# Patient Record
Sex: FEMALE | Race: WHITE | NOT HISPANIC OR LATINO | Employment: OTHER | URBAN - NONMETROPOLITAN AREA
[De-identification: names, ages, dates, MRNs, and addresses within clinical notes are randomized per-mention and may not be internally consistent; named-entity substitution may affect disease eponyms.]

---

## 2017-01-18 ENCOUNTER — OFFICE VISIT (OUTPATIENT)
Dept: PSYCHIATRY | Facility: CLINIC | Age: 57
End: 2017-01-18

## 2017-01-18 DIAGNOSIS — F33.3 SEVERE EPISODE OF RECURRENT MAJOR DEPRESSIVE DISORDER, WITH PSYCHOTIC FEATURES (HCC): Primary | ICD-10-CM

## 2017-01-18 PROCEDURE — 90837 PSYTX W PT 60 MINUTES: CPT | Performed by: SOCIAL WORKER

## 2017-01-18 NOTE — MR AVS SNAPSHOT
Delphine Mcmillan   2017 8:00 AM   Appointment    Dept Phone:  [phone]   Encounter #:  [id number]    Provider:  Hussein Cruz LCSW   Department:  [facility] BEHAVIORAL HEALTH                Your Full Care Plan              Your Updated Medication List          This list is accurate as of: 17  9:09 AM.  Always use your most recent med list.                ARIPiprazole 5 MG tablet   Commonly known as:  ABILIFY   Take 1 tablet by mouth Daily.       buPROPion  MG 24 hr tablet   Commonly known as:  WELLBUTRIN XL   Take 1 tablet by mouth Every Morning.       FLUoxetine 20 MG capsule   Commonly known as:  PROzac   Take 3 daily       HYDROcodone-acetaminophen 7.5-325 MG per tablet   Commonly known as:  NORCO       methylphenidate 20 MG tablet   Commonly known as:  RITALIN   Take 1 tablet by mouth 2 (Two) Times a Day.       prazosin 5 MG capsule   Commonly known as:  MINIPRESS   Take 1 capsule by mouth Every Night.       QUEtiapine 50 MG tablet   Commonly known as:  SEROquel   1-2 tabs nightly as needed for sleep               Instructions     None    Patient Instructions History      Upcoming Appointments     Visit Type Date Time Department    PSYCHOTHERAPY 2017  8:00 AM [facility]    MEDICINE CHECK 2017  2:30 PM [facility]    [facility]      MyChart Signup     [facility] [employer] allows you to send messages to your doctor, view your test results, renew your prescriptions, schedule appointments, and more. To sign up, go to [employer] and click on the Sign Up Now link in the New User? box. Enter your Yap Activation Code exactly as it appears below along with the last four digits of your Social Security Number and your Date of Birth () to complete the sign-up process. If you do not sign up before the expiration date, you must request a new code.    Yap Activation Code:  [email or web address]  Expires: 2/1/2017  9:09 AM    If you have questions, you can email [email or web address] or call [phone] to talk to our MyChart staff. Remember, [employer] is NOT to be used for urgent needs. For medical emergencies, dial 911.               Other Info from Your Visit           Your Appointments     Jan 19, 2017  2:30 PM EST   Medicine Check with Bakari Montanez MD   [facility] BEHAVIORAL HEALTH (--)    [address]  [place] [address]   [phone]            Feb 15, 2017  8:00 AM EST   Psychotherapy with Hussein Cruz LCSW   [facility] BEHAVIORAL HEALTH (--)    [address]  [place] [address]   [phone]              Allergies     Codeine  Hives, Itching

## 2017-01-19 ENCOUNTER — OFFICE VISIT (OUTPATIENT)
Dept: PSYCHIATRY | Facility: CLINIC | Age: 57
End: 2017-01-19

## 2017-01-19 VITALS
BODY MASS INDEX: 41.62 KG/M2 | DIASTOLIC BLOOD PRESSURE: 86 MMHG | HEIGHT: 66 IN | WEIGHT: 259 LBS | HEART RATE: 86 BPM | SYSTOLIC BLOOD PRESSURE: 152 MMHG

## 2017-01-19 DIAGNOSIS — F40.01 PANIC DISORDER WITH AGORAPHOBIA: ICD-10-CM

## 2017-01-19 DIAGNOSIS — F33.3 MDD (MAJOR DEPRESSIVE DISORDER), RECURRENT, SEVERE, WITH PSYCHOSIS (HCC): Primary | ICD-10-CM

## 2017-01-19 PROCEDURE — 99214 OFFICE O/P EST MOD 30 MIN: CPT | Performed by: PSYCHIATRY & NEUROLOGY

## 2017-01-19 RX ORDER — METHYLPHENIDATE HYDROCHLORIDE 20 MG/1
TABLET ORAL
Qty: 90 TABLET | Refills: 0 | Status: SHIPPED | OUTPATIENT
Start: 2017-01-19 | End: 2017-03-22 | Stop reason: SDUPTHER

## 2017-01-19 RX ORDER — QUETIAPINE FUMARATE 50 MG/1
TABLET, FILM COATED ORAL
Qty: 60 TABLET | Refills: 1 | Status: SHIPPED | OUTPATIENT
Start: 2017-01-19 | End: 2017-03-22 | Stop reason: SDUPTHER

## 2017-01-19 RX ORDER — FLUOXETINE HYDROCHLORIDE 20 MG/1
CAPSULE ORAL
Qty: 90 CAPSULE | Refills: 1 | Status: SHIPPED | OUTPATIENT
Start: 2017-01-19 | End: 2017-03-22 | Stop reason: SDUPTHER

## 2017-01-19 RX ORDER — PRAZOSIN HYDROCHLORIDE 2 MG/1
6 CAPSULE ORAL NIGHTLY
Qty: 90 CAPSULE | Refills: 1 | Status: SHIPPED | OUTPATIENT
Start: 2017-01-19 | End: 2017-03-22 | Stop reason: SDUPTHER

## 2017-01-19 RX ORDER — ARIPIPRAZOLE 5 MG/1
5 TABLET ORAL DAILY
Qty: 30 TABLET | Refills: 1 | Status: SHIPPED | OUTPATIENT
Start: 2017-01-19 | End: 2017-03-22 | Stop reason: SDUPTHER

## 2017-01-19 RX ORDER — BUPROPION HYDROCHLORIDE 300 MG/1
300 TABLET ORAL EVERY MORNING
Qty: 30 TABLET | Refills: 1 | Status: SHIPPED | OUTPATIENT
Start: 2017-01-19 | End: 2017-03-22 | Stop reason: SDUPTHER

## 2017-01-19 RX ORDER — METHYLPHENIDATE HYDROCHLORIDE 20 MG/1
TABLET ORAL
Qty: 90 TABLET | Refills: 0 | Status: SHIPPED | OUTPATIENT
Start: 2017-01-19 | End: 2017-01-19 | Stop reason: SDUPTHER

## 2017-01-19 NOTE — PROGRESS NOTES
"Hipolito Akers : 1960    MULTIDISCIPLINARY PROGRESS NOTE     Date of Service: 2017  Time In: 8:05 AM        Time Out: 9:00 AM     DATA: Patient met 1:1 with Hussein Cruz LCSW, LCADC for scheduled individual outpatient psychotherapy session at the [facility] for follow-up.  Patient reports she continues to spend most of her time at home and states she only leaves for necessary appointments.  She states \"I feel safe when I'm at home\".  Patient does report her  recently experienced Medicare denying one of his medications which causes her to be afraid they will stop paying for her medications.  Patient states \"it scares me to death because I know it helps me\".  The patient states anytime she leaves the home she begins thinking about all the catastrophic things that could happen that haven't happened yet.  She even states she has been unable to visit her daughter, who recently was , even after her granddaughter asked her to come.  The patient states she doesn't like to talk on the phone or have any interaction with anyone if she can avoid it.    HPI: Patient reports she continues to struggle with symptoms including depressed mood, low motivation, difficulty concentrating, becoming easily agitated, anhedonia, racing thoughts, obsessive worry, low self-esteem, extreme social isolation, feelings of hopelessness, difficulty interacting with others, feeling overwhelmed, distinct sense of impending doom, paranoid ideation, and tendency to catastrophize.. She continues to rate symptoms of depression at an 7 on a scale of 1-10 with 10 being the most severe. She reports she continues to adhere to medication regimen as prescribed with no side effects.  Patient adamantly and convincingly denies current suicidal or homicidal ideation.  Patient vehemently denies any substance use.      CLNICAL MANEUVERING/INTERVENTION: Assisted patient in processing above session content; acknowledged and normalized " patient’s thoughts, feelings, and fears.  Assisted patient in identifying and verbalizing her tendency toward paranoia and her irrational thought process.  Attempted to walk the patient through identifying the nature of her irrational thoughts and demonstrated challenging faulty thoughts with Factual counter arguments.  Also challenged the patient to consider her social isolation is likely reinforcing her symptoms by reducing her ability to cope with the stressors.  Challenge the patient to actively look for activities she can engage in on a regular basis to reduce social isolation.  Also encouraged patient to make a concerted effort to identify when she is being negative and to make a rational argument for why she can be positive in the same situation.    Allowed patient to freely discuss issues without interruption or judgment and provided safe, confidential environment to facilitate the development of positive therapeutic relationship and encourage open, honest communication. Assisted patient in identifying increased risk factors which would indicate the need for higher level of care including thoughts to harm self or others and/or self-harming behaviors and encouraged patient to contact this office, call 911, or present to the nearest emergency room. Discussed crisis intervention services and means to access.        ASSESSMENT: Patient presents for session on time, clean and casually dressed with depressed mood and somewhat blunted affect. Associations intact.  No evidence of intoxication, withdrawal, or perceptual disturbance.  Thought process is linear, thought content paranoid.  Attention and concentration fair, insight and judgment fair. Patient is oriented to person, place, and time.  Patient denies current suicidal or homicidal ideation. Patient appears to continue to build rapport with the undersigned.   Speech is clear and coherent.  Patient appears cooperative and agreeable to continued treatment but  remains apprehensive. Patient does not appear to be malingering.  Patient continues to struggle with severe symptoms of depression which are exacerbated by severe social isolation and her continued rumination and fear of social situations.  She appears to have accepted the path of least resistance by reviewing her home as her safe place and takes unnecessary steps to avoid leaving if possible.  As a result, she can be reasonably expected to continue to benefit from ongoing treatment and would likely be at increased risk for decompensation otherwise.    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  Patient will adhere to medication regimen as prescribed and report any side effects. Patient will contact this office, call 911 or present to the nearest emergency room should suicidal ideations occur. Continue Cognitive Behavioral Therapy and Solution Focused Therapy to improve functioning, maintain stability, and avoid decompensation and the need for higher level of care.     Hussein Cruz, ISAIAH, [facility]
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
## 2017-01-19 NOTE — MR AVS SNAPSHOT
Delphine Mcmillan   1/19/2017 2:30 PM   Office Visit    Dept Phone:  [phone]   Encounter #:  [id number]    Provider:  Bakari Montanez MD   Department:  [facility] BEHAVIORAL HEALTH                Your Full Care Plan              Today's Medication Changes          These changes are accurate as of: 1/19/17  3:23 PM.  If you have any questions, ask your nurse or doctor.               Medication(s)that have changed:     methylphenidate 20 MG tablet   Commonly known as:  RITALIN   Take 1.5 tabs po BID   What changed:    - how much to take  - how to take this  - when to take this  - additional instructions       prazosin 2 MG capsule   Commonly known as:  MINIPRESS   Take 3 capsules by mouth Every Night.   What changed:    - medication strength  - how much to take            Where to Get Your Medications      These medications were sent to [facility] [place] [address] - [phone]  - [phone] FX  [address] [address]     Phone:  [phone]     ARIPiprazole 5 MG tablet    buPROPion  MG 24 hr tablet    FLUoxetine 20 MG capsule    prazosin 2 MG capsule    QUEtiapine 50 MG tablet         You can get these medications from any pharmacy     Bring a paper prescription for each of these medications     methylphenidate 20 MG tablet                  Your Updated Medication List          This list is accurate as of: 1/19/17  3:23 PM.  Always use your most recent med list.                ARIPiprazole 5 MG tablet   Commonly known as:  ABILIFY   Take 1 tablet by mouth Daily.       buPROPion  MG 24 hr tablet   Commonly known as:  WELLBUTRIN XL   Take 1 tablet by mouth Every Morning.       FLUoxetine 20 MG capsule   Commonly known as:  PROzac   Take 3 daily       HYDROcodone-acetaminophen 7.5-325 MG per tablet   Commonly known as:  NORCO       methylphenidate 20 MG tablet   Commonly known as:  RITALIN   Take 1.5  "tabs po BID       prazosin 2 MG capsule   Commonly known as:  MINIPRESS   Take 3 capsules by mouth Every Night.       QUEtiapine 50 MG tablet   Commonly known as:  SEROquel   1-2 tabs nightly as needed for sleep               Instructions     None    Patient Instructions History      Upcoming Appointments     Visit Type Date Time Department    MEDICINE CHECK 2017  2:30 PM [facility]    PSYCHOTHERAPY 2/15/2017  8:00 AM [facility]    MEDICINE CHECK 3/22/2017  3:00 PM [facility]      MyChart Signup     [facility] [employer] allows you to send messages to your doctor, view your test results, renew your prescriptions, schedule appointments, and more. To sign up, go to [employer] and click on the Sign Up Now link in the New User? box. Enter your [employer] Activation Code exactly as it appears below along with the last four digits of your Social Security Number and your Date of Birth () to complete the sign-up process. If you do not sign up before the expiration date, you must request a new code.    [employer] Activation Code: [email or web address]  Expires: 2017  9:09 AM    If you have questions, you can email [email or web address] or call [phone] to talk to our [employer] staff. Remember, [employer] is NOT to be used for urgent needs. For medical emergencies, dial 911.               Other Info from Your Visit           Your Appointments     Feb 15, 2017  8:00 AM EST   Psychotherapy with Hussein Cruz LCSW   [facility] BEHAVIORAL HEALTH (--)    [address]  [place] [address]   [phone]            Mar 22, 2017  3:00 PM EDT   Medicine Check with Bakari Montanez MD   [facility] BEHAVIORAL [facility] (--)    [address]  [place] [address]   [phone]              Allergies     Codeine  Hives, Itching      Vital Signs     Blood Pressure Pulse Height Weight Body Mass Index       152/86 86 66\" (167.6 cm) 259 lb (117 kg) 41.8 kg/m2         "

## 2017-03-21 ENCOUNTER — OFFICE VISIT (OUTPATIENT)
Dept: PSYCHIATRY | Facility: CLINIC | Age: 57
End: 2017-03-21

## 2017-03-21 DIAGNOSIS — F40.01 PANIC DISORDER WITH AGORAPHOBIA: ICD-10-CM

## 2017-03-21 DIAGNOSIS — F33.3 SEVERE EPISODE OF RECURRENT MAJOR DEPRESSIVE DISORDER, WITH PSYCHOTIC FEATURES (HCC): Primary | ICD-10-CM

## 2017-03-21 PROCEDURE — 90834 PSYTX W PT 45 MINUTES: CPT | Performed by: SOCIAL WORKER

## 2017-03-21 NOTE — TREATMENT PLAN
Multi-Disciplinary Problems (from Behavioral Health Treatment Plan)    Active Problems     Problem:  Patient Care Overview (Adult) (Priority: --)  (Start Date: 03/21/17) (Resolve Date: --)    Problem Details:  The patient continues to struggle with severe depression which also leads to irrational thought process and severe anxiety.  Patient continues to spend the vast majority of her time in her bedroom and continues to struggle with depressed mood, low self-esteem, negative self-image, difficulty concentrating, difficulty interacting with others, difficulty driving, paranoid ideation, tendency to catastrophize, increased heart rate, feeling on edge, difficulty breathing, and an intense sense of impending doom.  The patient's symptoms continued to cause significant impairment in important areas of functioning including the ability to engage in social situations including even with close family members, keeping appointments, and engaging in normal household activities including shopping etc.       Goal Start Date End Date    Plan of Care Review 03/21/17 --    Goal Details:  The patient will continue in individual outpatient psychotherapy session at the [facility] every 3-4 weeks and pharmacotherapy as scheduled with Dr. Montanez.  Patient will continue to adhere to medication regimen as prescribed and report any side effects.  Will continue to utilize cognitive behavioral therapy and motivational interviewing techniques to challenge the patient's faulty, irrational thoughts and deconstruct her paranoid ideation.  We will continue to provide safe, confidential environment to facilitate the development and maintenance of positive therapeutic relationship and encourage open, honest communication.  All this to improve the patient's functioning, maintain stability, and avoid higher level of care.                          I have discussed and reviewed this treatment plan with the patient.  It has been printed for  signatures.
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
## 2017-03-22 ENCOUNTER — OFFICE VISIT (OUTPATIENT)
Dept: PSYCHIATRY | Facility: CLINIC | Age: 57
End: 2017-03-22

## 2017-03-22 VITALS
DIASTOLIC BLOOD PRESSURE: 84 MMHG | WEIGHT: 261 LBS | HEART RATE: 98 BPM | HEIGHT: 66 IN | SYSTOLIC BLOOD PRESSURE: 140 MMHG | BODY MASS INDEX: 41.95 KG/M2

## 2017-03-22 DIAGNOSIS — F33.3 SEVERE EPISODE OF RECURRENT MAJOR DEPRESSIVE DISORDER, WITH PSYCHOTIC FEATURES (HCC): Primary | ICD-10-CM

## 2017-03-22 DIAGNOSIS — F40.01 PANIC DISORDER WITH AGORAPHOBIA: ICD-10-CM

## 2017-03-22 PROCEDURE — 99213 OFFICE O/P EST LOW 20 MIN: CPT | Performed by: PSYCHIATRY & NEUROLOGY

## 2017-03-22 RX ORDER — QUETIAPINE FUMARATE 50 MG/1
TABLET, FILM COATED ORAL
Qty: 60 TABLET | Refills: 2 | Status: SHIPPED | OUTPATIENT
Start: 2017-03-22 | End: 2017-06-21 | Stop reason: SDUPTHER

## 2017-03-22 RX ORDER — METHYLPHENIDATE HYDROCHLORIDE 20 MG/1
TABLET ORAL
Qty: 90 TABLET | Refills: 0 | Status: SHIPPED | OUTPATIENT
Start: 2017-03-22 | End: 2017-05-02 | Stop reason: SDUPTHER

## 2017-03-22 RX ORDER — PRAZOSIN HYDROCHLORIDE 2 MG/1
6 CAPSULE ORAL NIGHTLY
Qty: 90 CAPSULE | Refills: 2 | Status: SHIPPED | OUTPATIENT
Start: 2017-03-22 | End: 2017-06-21 | Stop reason: SDUPTHER

## 2017-03-22 RX ORDER — ATORVASTATIN CALCIUM 10 MG/1
TABLET, FILM COATED ORAL NIGHTLY
COMMUNITY
Start: 2017-03-01 | End: 2017-06-21 | Stop reason: DRUGHIGH

## 2017-03-22 RX ORDER — BUPROPION HYDROCHLORIDE 300 MG/1
300 TABLET ORAL EVERY MORNING
Qty: 30 TABLET | Refills: 2 | Status: SHIPPED | OUTPATIENT
Start: 2017-03-22 | End: 2017-06-21 | Stop reason: SDUPTHER

## 2017-03-22 RX ORDER — FLUOXETINE HYDROCHLORIDE 20 MG/1
CAPSULE ORAL
Qty: 90 CAPSULE | Refills: 2 | Status: SHIPPED | OUTPATIENT
Start: 2017-03-22 | End: 2017-06-21 | Stop reason: SDUPTHER

## 2017-03-22 RX ORDER — ARIPIPRAZOLE 5 MG/1
5 TABLET ORAL DAILY
Qty: 30 TABLET | Refills: 2 | Status: SHIPPED | OUTPATIENT
Start: 2017-03-22 | End: 2017-06-21 | Stop reason: SDUPTHER

## 2017-03-22 RX ORDER — LEVOTHYROXINE SODIUM 0.1 MG/1
TABLET ORAL DAILY
COMMUNITY
Start: 2017-03-01 | End: 2022-09-29

## 2017-03-22 RX ORDER — OMEPRAZOLE 20 MG/1
CAPSULE, DELAYED RELEASE ORAL
COMMUNITY
Start: 2017-02-03

## 2017-03-22 RX ORDER — METHYLPHENIDATE HYDROCHLORIDE 20 MG/1
TABLET ORAL
Qty: 90 TABLET | Refills: 0 | Status: SHIPPED | OUTPATIENT
Start: 2017-03-22 | End: 2017-03-22 | Stop reason: SDUPTHER

## 2017-03-22 NOTE — PROGRESS NOTES
"  CC: \"stressed--I need to be home---it's way too late to be out.\"    HX:  The patient came into the office today saying she was stressed and worried about being out to late.  After she started talking, she appeared much less anxious than usual.  She continues to isolate in her room at home and worries about when she does have to leave such as coming to therapy or coming to my appointments.  Over the weekend, her son wanted her to visit a house that they were looking at purchasing; however, the morning of, she was too anxious to go.  She continues to feel depressed.  No auditory or visual hallucinations.  No medication side effects.  Currently, the patient feels like her medications are at a good level and does not want to make changes today.    Depression rating 8/10  Anxiety rating 10/10  Appetite-no change  Energy level-fair  Sleep- slightly improved, dreams less vivid and scary.     I have reviewed pt's allergies and current medications.     Review of Systems   Constitutional: Negative.    Cardiovascular: Negative.    Gastrointestinal: Negative.    Neurological: Negative.      /84  Pulse 98  Ht 66\" (167.6 cm)  Wt 261 lb (118 kg)  BMI 42.13 kg/m2    EXAM: Neatly, casually dressed, good hygeine. Gait and station stable. No psychomotor agitation/retardation. No motor tics Speech is normal rate, amount. Associations intact. Mood \"anxious\" affect constricted, appears less anxious today than past TC-goal directed.  Denies SI/HI/VH/AH. TP-linear.  Attention and concentration are fair. Memory is intact for recent and remote events. Insight-limited, judgement- limited      Encounter Diagnoses   Name Primary?   • Severe episode of recurrent major depressive disorder, with psychotic features Yes   • Panic disorder with agoraphobia        Current Outpatient Prescriptions   Medication Sig Dispense Refill   • ARIPiprazole (ABILIFY) 5 MG tablet Take 1 tablet by mouth Daily. 30 tablet 2   • atorvastatin (LIPITOR) 10 " MG tablet Every Night.     • buPROPion XL (WELLBUTRIN XL) 300 MG 24 hr tablet Take 1 tablet by mouth Every Morning. 30 tablet 2   • FLUoxetine (PROzac) 20 MG capsule Take 3 daily 90 capsule 2   • HYDROcodone-acetaminophen (NORCO) 7.5-325 MG per tablet      • levothyroxine (SYNTHROID, LEVOTHROID) 100 MCG tablet Daily.     • methylphenidate (RITALIN) 20 MG tablet Take 1.5 tabs po BID 90 tablet 0   • omeprazole (priLOSEC) 20 MG capsule      • prazosin (MINIPRESS) 2 MG capsule Take 3 capsules by mouth Every Night. 90 capsule 2   • QUEtiapine (SEROquel) 50 MG tablet 1-2 tabs nightly as needed for sleep 60 tablet 2     No current facility-administered medications for this visit.    Plan:  1.  Current medications  2.  Continue therapy with Hussein Cruz  3.  Return to clinic 2-3 months          The risks, benefits, and treatment alternatives were discussed with the patient.     TIME IN:3:25P  TIME OUT:3:48P

## 2017-04-19 ENCOUNTER — OFFICE VISIT (OUTPATIENT)
Dept: PSYCHIATRY | Facility: CLINIC | Age: 57
End: 2017-04-19

## 2017-04-19 DIAGNOSIS — F40.01 PANIC DISORDER WITH AGORAPHOBIA: ICD-10-CM

## 2017-04-19 DIAGNOSIS — F33.3 SEVERE EPISODE OF RECURRENT MAJOR DEPRESSIVE DISORDER, WITH PSYCHOTIC FEATURES (HCC): Primary | ICD-10-CM

## 2017-04-19 PROCEDURE — 90834 PSYTX W PT 45 MINUTES: CPT | Performed by: SOCIAL WORKER

## 2017-04-19 NOTE — PROGRESS NOTES
"Hipolito Akers : 1960    MULTIDISCIPLINARY PROGRESS NOTE     Date of Service: 2017  Time In: 8:50 AM       Time Out: 9:30 AM    DATA: Patient met 1:1 with Hussein Cruz LCSW, LCADC for scheduled individual outpatient psychotherapy session at the [facility] for follow-up.  Patient states \"I just need to be home where I feel safe\".  She reports she has been stressed as she and her  have planned a trip to [place] to assist her son in moving.  Patient also states her daughter has a baby shower in the coming days and states her  is pushing her to go to [facility] following this session to get her gift.  As a result, she states she is very stressed and does not want to go.      HPI: Patient continues to struggle with depressed mood, low motivation, anhedonia, difficulty concentrating, becoming easily agitated, racing thoughts, obsessive worry, low self-esteem, negative self-image, extreme social isolation, feelings of hopelessness, difficulty interacting with others, feeling overwhelmed, distinct sense of impending doom, paranoid ideation, and tendency to catastrophize.. She rates current symptoms at  a 9 on  a scale of 1-10 with 10 being the most severe.  patient has a tendency to obsessively worry about things that might happen in the future which adds to her panic and paranoia.  She reports she continues to adhere to medication regimen as prescribed with no side effects.  Patient adamantly and convincingly denies current suicidal or homicidal ideation.  Patient vehemently denies any substance use.      CLNICAL MANEUVERING/INTERVENTION: Assisted patient in processing above session content; acknowledged and normalized patient’s thoughts, feelings, and fears. utilized motivational interviewing to assist the patient in identifying and acknowledging incidences where she was able to overcome her fears and follow-through with tasks including attending both of her children's weddings.  The " patient demonstrates some insight as she states she realizes these experiences are usually not as bad as she has built up in her mind.  However, she continues to struggle to overcome her sense of fear and her acceptance of the status quo.  As a result, assisted the patient in developing factual counter arguments to challenge her irrational fears.  Also encouraged the patient to consider getting a tablet so she can utilize various games as a thought blocking technique.  The patient is agreeable and states she is now looking forward to going to [facility] following this session.  Provided unconditional positive regard in a safe, supportive environment.        Allowed patient to freely discuss issues without interruption or judgment and provided safe, confidential environment to facilitate the development of positive therapeutic relationship and encourage open, honest communication. Assisted patient in identifying increased risk factors which would indicate the need for higher level of care including thoughts to harm self or others and/or self-harming behaviors and encouraged patient to contact this office, call 911, or present to the nearest emergency room. Discussed crisis intervention services and means to access.        ASSESSMENT: Patient presents for session on time, clean and casually dressed with depressed/anxious  mood and Congruent  affect. Associations intact.  No evidence of intoxication, withdrawal, or perceptual disturbance.  Thought process is linear, thought content paranoid.  Attention and concentration fair, insight and judgment fair. Patient is oriented to person, place, and time.  Patient denies current suicidal or homicidal ideation. Patient appears to continue to build rapport with the undersigned and maintain positive therapeutic relationship..   Speech is clear and coherent.  Patient appears cooperative and agreeable to continued treatment but remains apprehensive. Patient does not appear to be  malingering.  The patient continues to struggle with severe symptoms of depression, panic, and  paranoid ideation.    The patient's symptomology causes significant impairment in important areas of functioning, especially any situation outside her home.  As a result, she can be reasonably expected to continue to benefit from ongoing treatment and would likely be at increased risk for decompensation otherwise.    PLAN: Patient will continue in monthly individual outpatient psychotherapy sessions at the [facility] and pharmacotherapy as scheduled.  Patient will adhere to medication regimen as prescribed and report any side effects. Patient will contact this office, call 911 or present to the nearest emergency room should suicidal ideations occur. Continue Cognitive Behavioral Therapy and Solution Focused Therapy to improve functioning, maintain stability, and avoid decompensation and the need for higher level of care.     Hussein Cruz LCSW, [facility]

## 2017-05-03 RX ORDER — METHYLPHENIDATE HYDROCHLORIDE 20 MG/1
TABLET ORAL
Qty: 90 TABLET | Refills: 0 | Status: SHIPPED | OUTPATIENT
Start: 2017-05-03 | End: 2017-06-06 | Stop reason: SDUPTHER

## 2017-05-17 ENCOUNTER — OFFICE VISIT (OUTPATIENT)
Dept: PSYCHIATRY | Facility: CLINIC | Age: 57
End: 2017-05-17

## 2017-05-17 DIAGNOSIS — F40.01 PANIC DISORDER WITH AGORAPHOBIA: ICD-10-CM

## 2017-05-17 DIAGNOSIS — F33.3 SEVERE EPISODE OF RECURRENT MAJOR DEPRESSIVE DISORDER, WITH PSYCHOTIC FEATURES (HCC): Primary | ICD-10-CM

## 2017-05-17 PROCEDURE — 90837 PSYTX W PT 60 MINUTES: CPT | Performed by: SOCIAL WORKER

## 2017-06-07 RX ORDER — METHYLPHENIDATE HYDROCHLORIDE 20 MG/1
TABLET ORAL
Qty: 90 TABLET | Refills: 0 | Status: SHIPPED | OUTPATIENT
Start: 2017-06-07 | End: 2017-06-21 | Stop reason: SDUPTHER

## 2017-06-14 ENCOUNTER — OFFICE VISIT (OUTPATIENT)
Dept: PSYCHIATRY | Facility: CLINIC | Age: 57
End: 2017-06-14

## 2017-06-14 DIAGNOSIS — F40.01 PANIC DISORDER WITH AGORAPHOBIA: ICD-10-CM

## 2017-06-14 DIAGNOSIS — F33.3 SEVERE EPISODE OF RECURRENT MAJOR DEPRESSIVE DISORDER, WITH PSYCHOTIC FEATURES (HCC): Primary | ICD-10-CM

## 2017-06-14 PROCEDURE — 90832 PSYTX W PT 30 MINUTES: CPT | Performed by: SOCIAL WORKER

## 2017-06-14 NOTE — PROGRESS NOTES
"Hipolito Akers : 1960    MULTIDISCIPLINARY PROGRESS NOTE     Date of Service: 2017  Time In: 8:45 AM        Time Out: 9:20 AM    DATA: Patient met 1:1 with Hussein Cruz LCSW, LCADC for scheduled individual outpatient psychotherapy session at the [facility] for follow-up.  Patient reports her second granddaughter was born all Memorial Day and states she is already in love with her.  However, she states she continues to dread babysitting when her daughter returns to work in approximately 2 months.  Patient reports she continues to spend the vast majority of her time at home and primarily stays in her room.  Patient reports she has not been able to get a tablet as of yet due to her fear of spending money as a result of the financial difficulties she and her  experienced while she was seeking disability benefits.  She states her  continues to prompt her to get one but she is simply unable to spend the money.  Patient also discusses her fear of an upcoming family reunion and states \"I just don't think I can do it\".    HPI: Patient continues to struggle with depressed mood, low motivation, difficulty concentrating, becoming easily agitated, anhedonia, anergia, racing thoughts, obsessive worry, low self-esteem, negative self-image, extreme social isolation, feelings of hopelessness, difficulty interacting with others, feeling overwhelmed, distinct sense of impending doom, paranoid ideation, and tendency to catastrophize.  The patient continues to discuss the fact she feels others are watching her and could harm her.  She specifically states she believes the police watch her when she leaves her home.  The patient continues to make faulty cause/affect Association.  She rates current symptoms at an 8 on a scale of 1-10 with 10 being the most severe. She reports she continues to adhere to medication regimen as prescribed with no side effects.  Patient adamantly and convincingly denies current " suicidal or homicidal ideation.  Patient vehemently denies any substance use.      CLNICAL MANEUVERING/INTERVENTION: Assisted patient in processing above session content; acknowledged and normalized patient’s thoughts, feelings, and fears.  Allowed the patient to discuss her feelings and fears concerning the recent birth of her granddaughter and validated her feelings.  Also congratulated the patient on her new grandchild.  Prompted the patient to identify and acknowledge her symptoms multiple negative impact on her life including being as involved with family members as she would like, going out into public to do such activities as shopping, and having a negative impact on multiple interpersonal relationships.  Utilized in her motivational interviewing to assist the patient in identifying and acknowledging the fact she has been able to overcome her fears in the past and utilize the example of when her daughter called her at night and ask her to meet her at the hospital.  Discussed the concept of the path of least resistance and encourage the patient to continue to challenge her comfort zone.  Also challenged the patient's metacognition that something negative will always happen and challenged her to begin to attempt to focus on the positive.  Provided unconditional positive regard in a safe, supportive environment.       Allowed patient to freely discuss issues without interruption or judgment and provided safe, confidential environment to facilitate the development of positive therapeutic relationship and encourage open, honest communication. Assisted patient in identifying increased risk factors which would indicate the need for higher level of care including thoughts to harm self or others and/or self-harming behaviors and encouraged patient to contact this office, call 911, or present to the nearest emergency room. Discussed crisis intervention services and means to access.        ASSESSMENT: Patient presents  for session on time, clean and casually dressed with depressed mood and somewhat blunted affect. Associations intact.  No evidence of intoxication, withdrawal, or perceptual disturbance.  Thought process is linear, thought content paranoid.  Attention and concentration fair, insight and judgment fair. Patient is oriented to person, place, and time.  Patient denies current suicidal or homicidal ideation. Patient appears to continue to build rapport with the undersigned and maintain positive therapeutic relationship..   Speech is clear and coherent.  Patient appears cooperative and agreeable to continued treatment but remains apprehensive. Patient does not appear to be malingering.  The patient continues to struggle with severe symptoms of depression with paranoid ideation which also leads to significant symptoms of panic.  The patient's symptomology causes significant impairment in important areas of functioning, especially any situation outside her home.  As a result, she can be reasonably expected to continue to benefit from ongoing treatment and would likely be at increased risk for decompensation otherwise.    PLAN: Patient will continue in monthly individual outpatient psychotherapy sessions at the [facility] and pharmacotherapy as scheduled.  Patient will adhere to medication regimen as prescribed and report any side effects. Patient will contact this office, call 911 or present to the nearest emergency room should suicidal ideations occur. Continue Cognitive Behavioral Therapy and Solution Focused Therapy to improve functioning, maintain stability, and avoid decompensation and the need for higher level of care.     Hussein Cruz, ISAIAH, [facility]

## 2017-06-21 ENCOUNTER — OFFICE VISIT (OUTPATIENT)
Dept: PSYCHIATRY | Facility: CLINIC | Age: 57
End: 2017-06-21

## 2017-06-21 VITALS
HEIGHT: 66 IN | HEART RATE: 83 BPM | SYSTOLIC BLOOD PRESSURE: 143 MMHG | WEIGHT: 259 LBS | BODY MASS INDEX: 41.62 KG/M2 | DIASTOLIC BLOOD PRESSURE: 88 MMHG

## 2017-06-21 DIAGNOSIS — F32.3 SEVERE SINGLE CURRENT EPISODE OF MAJOR DEPRESSIVE DISORDER, WITH PSYCHOTIC FEATURES (HCC): Primary | ICD-10-CM

## 2017-06-21 DIAGNOSIS — F40.01 PANIC DISORDER WITH AGORAPHOBIA: ICD-10-CM

## 2017-06-21 PROCEDURE — 99213 OFFICE O/P EST LOW 20 MIN: CPT | Performed by: PSYCHIATRY & NEUROLOGY

## 2017-06-21 RX ORDER — QUETIAPINE FUMARATE 50 MG/1
TABLET, FILM COATED ORAL
Qty: 60 TABLET | Refills: 2 | Status: SHIPPED | OUTPATIENT
Start: 2017-06-21 | End: 2017-08-31 | Stop reason: SDUPTHER

## 2017-06-21 RX ORDER — METHYLPHENIDATE HYDROCHLORIDE 20 MG/1
TABLET ORAL
Qty: 90 TABLET | Refills: 0 | Status: SHIPPED | OUTPATIENT
Start: 2017-06-21 | End: 2017-08-31 | Stop reason: SDUPTHER

## 2017-06-21 RX ORDER — ATORVASTATIN CALCIUM 20 MG/1
TABLET, FILM COATED ORAL
COMMUNITY
Start: 2017-05-01 | End: 2022-03-28

## 2017-06-21 RX ORDER — METHYLPHENIDATE HYDROCHLORIDE 20 MG/1
TABLET ORAL
Qty: 90 TABLET | Refills: 0 | Status: SHIPPED | OUTPATIENT
Start: 2017-06-21 | End: 2017-06-21 | Stop reason: SDUPTHER

## 2017-06-21 RX ORDER — BUPROPION HYDROCHLORIDE 300 MG/1
300 TABLET ORAL EVERY MORNING
Qty: 30 TABLET | Refills: 2 | Status: SHIPPED | OUTPATIENT
Start: 2017-06-21 | End: 2017-08-31 | Stop reason: SDUPTHER

## 2017-06-21 RX ORDER — ARIPIPRAZOLE 5 MG/1
5 TABLET ORAL DAILY
Qty: 30 TABLET | Refills: 2 | Status: SHIPPED | OUTPATIENT
Start: 2017-06-21 | End: 2017-08-31 | Stop reason: SDUPTHER

## 2017-06-21 RX ORDER — PRAZOSIN HYDROCHLORIDE 2 MG/1
6 CAPSULE ORAL NIGHTLY
Qty: 90 CAPSULE | Refills: 2 | Status: SHIPPED | OUTPATIENT
Start: 2017-06-21 | End: 2017-08-31 | Stop reason: SDUPTHER

## 2017-06-21 RX ORDER — FLUOXETINE HYDROCHLORIDE 20 MG/1
CAPSULE ORAL
Qty: 90 CAPSULE | Refills: 2 | Status: SHIPPED | OUTPATIENT
Start: 2017-06-21 | End: 2017-08-31 | Stop reason: SDUPTHER

## 2017-06-21 NOTE — PROGRESS NOTES
"  CC: f/u MDD, PTSD    HX:  The patient continues to experience intense anxiety and prefers to isolate in her room.  She is caring for her new grandbaby.  She described feeling conflicted of both loving the baby but doesn't want the responsibility.  She denies any thoughts of harming the child.  She also denies SI/HI.  Denies voices.  She continues to have trouble sleeping and feeling tired. No med s/e but still frustrated with weight gain.  She has lost 2 lbs since last appt.     Appetite-no changes.   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     Review of Systems   Constitutional: Negative.    Cardiovascular: Negative.    Gastrointestinal: Negative.    Neurological: Negative.      /88  Pulse 83  Ht 66\" (167.6 cm)  Wt 259 lb (117 kg)  BMI 41.8 kg/m2      EXAM: Neatly, casually dressed, good hygeine. Gait and station stable. No psychomotor agitation/retardation. No motor tics Speech is normal rate, amount. Associations intact. Mood \"okay\" affect still constricted but appears more euthymic today TC-goal directed. Denies SI/HI/VH/AH. TP-linear. Attention and concentration are fair. Memory is intact for recent and remote events. Insight-limited, judgement- limited    Encounter Diagnoses   Name Primary?   • Severe single current episode of major depressive disorder, with psychotic features Yes   • Panic disorder with agoraphobia        Current Outpatient Prescriptions   Medication Sig Dispense Refill   • ARIPiprazole (ABILIFY) 5 MG tablet Take 1 tablet by mouth Daily. 30 tablet 2   • atorvastatin (LIPITOR) 20 MG tablet      • buPROPion XL (WELLBUTRIN XL) 300 MG 24 hr tablet Take 1 tablet by mouth Every Morning. 30 tablet 2   • FLUoxetine (PROzac) 20 MG capsule Take 3 daily 90 capsule 2   • HYDROcodone-acetaminophen (NORCO) 7.5-325 MG per tablet      • levothyroxine (SYNTHROID, LEVOTHROID) 100 MCG tablet Daily.     • " omeprazole (priLOSEC) 20 MG capsule      • prazosin (MINIPRESS) 2 MG capsule Take 3 capsules by mouth Every Night. 90 capsule 2   • QUEtiapine (SEROquel) 50 MG tablet 1-2 tabs nightly as needed for sleep 60 tablet 2   • methylphenidate (RITALIN) 20 MG tablet Take 1.5 tabs po BID 90 tablet 0     No current facility-administered medications for this visit.    Plan:  1. continue current medications.    2. Obtain recent labs from PCP including lipid panel  3. rtc 3 months  4. Continue therapy with Hussein Cruz.     The risks, benefits, and treatment alternatives were discussed with the patient.     TIME IN:300PM  TIME OUT:324PM

## 2017-08-31 ENCOUNTER — OFFICE VISIT (OUTPATIENT)
Dept: PSYCHIATRY | Facility: CLINIC | Age: 57
End: 2017-08-31

## 2017-08-31 VITALS
SYSTOLIC BLOOD PRESSURE: 139 MMHG | HEART RATE: 94 BPM | DIASTOLIC BLOOD PRESSURE: 67 MMHG | HEIGHT: 66 IN | WEIGHT: 257 LBS | BODY MASS INDEX: 41.3 KG/M2

## 2017-08-31 DIAGNOSIS — F40.01 PANIC DISORDER WITH AGORAPHOBIA: ICD-10-CM

## 2017-08-31 DIAGNOSIS — F33.3 SEVERE EPISODE OF RECURRENT MAJOR DEPRESSIVE DISORDER, WITH PSYCHOTIC FEATURES (HCC): ICD-10-CM

## 2017-08-31 PROCEDURE — 99213 OFFICE O/P EST LOW 20 MIN: CPT | Performed by: PSYCHIATRY & NEUROLOGY

## 2017-08-31 RX ORDER — FLUOXETINE HYDROCHLORIDE 20 MG/1
CAPSULE ORAL
Qty: 90 CAPSULE | Refills: 2 | Status: SHIPPED | OUTPATIENT
Start: 2017-08-31 | End: 2017-11-29 | Stop reason: SDUPTHER

## 2017-08-31 RX ORDER — ARIPIPRAZOLE 5 MG/1
5 TABLET ORAL DAILY
Qty: 30 TABLET | Refills: 2 | Status: SHIPPED | OUTPATIENT
Start: 2017-08-31 | End: 2017-11-29 | Stop reason: SDUPTHER

## 2017-08-31 RX ORDER — METHYLPHENIDATE HYDROCHLORIDE 20 MG/1
TABLET ORAL
Qty: 90 TABLET | Refills: 0 | Status: SHIPPED | OUTPATIENT
Start: 2017-08-31 | End: 2017-11-20 | Stop reason: SDUPTHER

## 2017-08-31 RX ORDER — BUPROPION HYDROCHLORIDE 300 MG/1
300 TABLET ORAL EVERY MORNING
Qty: 30 TABLET | Refills: 2 | Status: SHIPPED | OUTPATIENT
Start: 2017-08-31 | End: 2017-11-29 | Stop reason: SDUPTHER

## 2017-08-31 RX ORDER — METHYLPHENIDATE HYDROCHLORIDE 20 MG/1
TABLET ORAL
Qty: 90 TABLET | Refills: 0 | Status: SHIPPED | OUTPATIENT
Start: 2017-08-31 | End: 2017-08-31 | Stop reason: SDUPTHER

## 2017-08-31 RX ORDER — QUETIAPINE FUMARATE 50 MG/1
TABLET, FILM COATED ORAL
Qty: 60 TABLET | Refills: 2 | Status: SHIPPED | OUTPATIENT
Start: 2017-08-31 | End: 2017-11-29 | Stop reason: SDUPTHER

## 2017-08-31 RX ORDER — PRAZOSIN HYDROCHLORIDE 2 MG/1
6 CAPSULE ORAL NIGHTLY
Qty: 90 CAPSULE | Refills: 2 | Status: SHIPPED | OUTPATIENT
Start: 2017-08-31 | End: 2017-11-29 | Stop reason: SDUPTHER

## 2017-09-06 ENCOUNTER — OFFICE VISIT (OUTPATIENT)
Dept: PSYCHIATRY | Facility: CLINIC | Age: 57
End: 2017-09-06

## 2017-09-06 DIAGNOSIS — F33.3 MAJOR DEPRESSIVE DISORDER, RECURRENT EPISODE, SEVERE, WITH PSYCHOSIS (HCC): Primary | ICD-10-CM

## 2017-09-06 DIAGNOSIS — F40.01 PANIC DISORDER WITH AGORAPHOBIA: ICD-10-CM

## 2017-09-06 PROCEDURE — 90832 PSYTX W PT 30 MINUTES: CPT | Performed by: SOCIAL WORKER

## 2017-09-06 NOTE — PROGRESS NOTES
"Date of Service: September 6, 2017  Time In: 11: 00 AM  Time Out: 11:30 AM      PROGRESS NOTE  Data:  Delphine Mcmillan is a 57 y.o. female who met 1:1 with Hussein Cruz LCSW,FRANK for regularly scheduled individual outpatient psychotherapy session at the [facility] for follow-up of depression and panic disorder.  Patient reports she initially was going to cancel this appointment until from staff member encouraged her to attempt to make it.  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  She states \"I just don't know if I can handle it\".     HPI: Patient continues to struggle with severe depression as evidenced by depressed mood, hopelessness and helplessness, feeling useless, low self-esteem, negative self-image, anhedonia, anergia, excessive worry, and severe social isolation.  She rates current symptoms of depression at an 8/9 on a scale of 1-10 with 10 being most severe.  She also continues to struggle with panic disorder as evidenced by a feeling of extreme fear, a general feeling of vulnerability, increased heart rate, tendency to ruminate, shaking, and an intense sense of impending doom.  Patient rates current symptoms of anxiety at an 8/9 on a scale of 1-10 with 10 being most severe.  Patient reports she continues to adhere to medication regimen as prescribed.  The patient adamantly and convincingly denies suicidal ideation and vehemently denies any substance use.      Clinical Maneuvering/Intervention:  Assisted patient in processing above session content; acknowledged and normalized patient’s thoughts, feelings, and concerns.  Utilize cognitive behavioral therapy to assist the patient in developing appropriate coping mechanisms to decrease severity and frequency of symptoms and increase her ability to function including positive self talk, thought blocking techniques, relaxation " techniques, and actively seeking activities she can engage in a regular basis to decrease idle time and social isolation.  Also discussed the mutually self-perpetuating nature of social isolation and the patient symptoms and encouraged her to look for ways to get out of her comfort zone which will ultimately build confidence and result and desensitization of triggers.  Also encouraged the patient to consider she might not be capable of babysitting 5 days weekly and to consider reducing the number of days weekly.  Continue to encourage the patient to consider getting herself a tablet for past time and thought blocking purposes.  Provided unconditional positive regard a safe, supportive environment.    Allowed patient to freely discuss issues without interruption or judgment. Provided safe, confidential environment to facilitate the development of positive therapeutic relationship and encourage open, honest communication. Assisted patient in identifying risk factors which would indicate the need for higher level of care including thoughts to harm self or others and/or self-harming behavior and encouraged patient to contact this office, call 911, or present to the nearest emergency room should any of these events occur. Discussed crisis intervention services and means to access.  Patient adamantly and convincingly denies current suicidal or homicidal ideation or perceptual disturbance.    Assessment    The patient continues to struggle with significant symptoms of depression and panic which appear to have been exacerbated as of late due to the patient babysitting her granddaughter 5 days weekly between 10 and 11 hours daily.  Patient's difficulty in leaving the home appears to also have been reinforced by the fact she has not left her room for any significant amount of time for the past several weeks.  As a result, the patient to be really expected to continue to benefit from treatment and would likely be at increased  risk for decompensation otherwise.    Diagnoses and all orders for this visit:    Major depressive disorder, recurrent episode, severe, with psychosis    Panic disorder with agoraphobia               Mental Status Exam  Hygiene:  good  Dress:  casual  Attitude:  Cooperative  Motor Activity:  Slow  Speech:  Monotone  Mood:  anxious and depressed  Affect:  very anxious  Thought Processes:  Linear  Thought Content:  normal  Suicidal Thoughts:  denies  Homicidal Thoughts:  denies  Crisis Safety Plan: yes, to come to the emergency room.  Hallucinations:  denies    Patient's Support Network Includes:  , daughter and son    Progress toward goal: Not at goal    Functional Status: Severe impairment    Prognosis: Fair with Ongoing Treatment     Plan         The patient will continue in individual outpatient psychotherapy sessions at the [facility] every 2-3 weeks.  Patient will continue with pharmacotherapy with Dr. Montanez and adhere to medication regimen as prescribed and report any side effects. Patient will contact this office, call 911 or present to the nearest emergency room should suicidal or homicidal ideations occur. Provide Cognitive Behavioral Therapy and Solution Focused Therapy to improve functioning, maintain stability, and avoid decompensation and the need for higher level of care.          Return in about 2 weeks (around 09/20/2017) for Next scheduled follow up.    Hussein Cruz, ISAIAH,[facility]

## 2017-11-20 RX ORDER — METHYLPHENIDATE HYDROCHLORIDE 20 MG/1
TABLET ORAL
Qty: 90 TABLET | Refills: 0 | Status: SHIPPED | OUTPATIENT
Start: 2017-11-20 | End: 2017-11-29 | Stop reason: SDUPTHER

## 2017-11-30 RX ORDER — FLUOXETINE HYDROCHLORIDE 20 MG/1
CAPSULE ORAL
Qty: 90 CAPSULE | Refills: 2 | Status: SHIPPED | OUTPATIENT
Start: 2017-11-30 | End: 2018-02-15 | Stop reason: SDUPTHER

## 2017-11-30 RX ORDER — BUPROPION HYDROCHLORIDE 300 MG/1
300 TABLET ORAL EVERY MORNING
Qty: 30 TABLET | Refills: 2 | Status: SHIPPED | OUTPATIENT
Start: 2017-11-30 | End: 2018-02-15 | Stop reason: SDUPTHER

## 2017-11-30 RX ORDER — ARIPIPRAZOLE 5 MG/1
5 TABLET ORAL DAILY
Qty: 30 TABLET | Refills: 2 | Status: SHIPPED | OUTPATIENT
Start: 2017-11-30 | End: 2018-02-15 | Stop reason: SDUPTHER

## 2017-11-30 RX ORDER — QUETIAPINE FUMARATE 50 MG/1
TABLET, FILM COATED ORAL
Qty: 60 TABLET | Refills: 2 | Status: SHIPPED | OUTPATIENT
Start: 2017-11-30 | End: 2018-02-15 | Stop reason: SDUPTHER

## 2017-11-30 RX ORDER — METHYLPHENIDATE HYDROCHLORIDE 20 MG/1
TABLET ORAL
Qty: 90 TABLET | Refills: 0 | Status: SHIPPED | OUTPATIENT
Start: 2017-11-30 | End: 2018-01-17 | Stop reason: SDUPTHER

## 2017-11-30 RX ORDER — PRAZOSIN HYDROCHLORIDE 2 MG/1
6 CAPSULE ORAL NIGHTLY
Qty: 90 CAPSULE | Refills: 2 | Status: SHIPPED | OUTPATIENT
Start: 2017-11-30 | End: 2018-02-15 | Stop reason: SDUPTHER

## 2018-01-18 RX ORDER — METHYLPHENIDATE HYDROCHLORIDE 20 MG/1
TABLET ORAL
Qty: 90 TABLET | Refills: 0 | Status: SHIPPED | OUTPATIENT
Start: 2018-01-18 | End: 2018-02-15 | Stop reason: SDUPTHER

## 2018-02-15 ENCOUNTER — OFFICE VISIT (OUTPATIENT)
Dept: PSYCHIATRY | Facility: CLINIC | Age: 58
End: 2018-02-15

## 2018-02-15 VITALS
WEIGHT: 247 LBS | HEIGHT: 66 IN | BODY MASS INDEX: 39.7 KG/M2 | SYSTOLIC BLOOD PRESSURE: 125 MMHG | DIASTOLIC BLOOD PRESSURE: 68 MMHG | HEART RATE: 125 BPM

## 2018-02-15 DIAGNOSIS — F33.3 SEVERE EPISODE OF RECURRENT MAJOR DEPRESSIVE DISORDER, WITH PSYCHOTIC FEATURES (HCC): Primary | ICD-10-CM

## 2018-02-15 DIAGNOSIS — Z79.899 MEDICATION MANAGEMENT: ICD-10-CM

## 2018-02-15 DIAGNOSIS — F43.10 POST TRAUMATIC STRESS DISORDER (PTSD): ICD-10-CM

## 2018-02-15 LAB
AMPHETAMINE CUT-OFF: 1000
BENZODIAZIPINE CUT-OFF: 300
BUPRENORPHINE CUT-OFF: 10
COCAINE CUT-OFF: 300
EXTERNAL AMPHETAMINE SCREEN URINE: NEGATIVE
EXTERNAL BENZODIAZEPINE SCREEN URINE: POSITIVE
EXTERNAL BUPRENORPHINE SCREEN URINE: NEGATIVE
EXTERNAL COCAINE SCREEN URINE: NEGATIVE
EXTERNAL MDMA: NEGATIVE
EXTERNAL METHADONE SCREEN URINE: NEGATIVE
EXTERNAL METHAMPHETAMINE SCREEN URINE: NEGATIVE
EXTERNAL OPIATES SCREEN URINE: POSITIVE
EXTERNAL OXYCODONE SCREEN URINE: POSITIVE
EXTERNAL THC SCREEN URINE: NEGATIVE
MDMA CUT-OFF: 500
METHADONE CUT-OFF: 300
METHAMPHETAMINE CUT-OFF: 1000
OPIATES CUT-OFF: 300
OXYCODONE CUT-OFF: 100
THC CUT-OFF: 50

## 2018-02-15 PROCEDURE — 99213 OFFICE O/P EST LOW 20 MIN: CPT | Performed by: PSYCHIATRY & NEUROLOGY

## 2018-02-15 RX ORDER — METHYLPHENIDATE HYDROCHLORIDE 20 MG/1
TABLET ORAL
Qty: 90 TABLET | Refills: 0 | Status: SHIPPED | OUTPATIENT
Start: 2018-02-15 | End: 2018-03-20 | Stop reason: SDUPTHER

## 2018-02-15 RX ORDER — QUETIAPINE FUMARATE 50 MG/1
TABLET, FILM COATED ORAL
Qty: 60 TABLET | Refills: 2 | Status: SHIPPED | OUTPATIENT
Start: 2018-02-15 | End: 2018-05-16 | Stop reason: SDUPTHER

## 2018-02-15 RX ORDER — FLUOXETINE HYDROCHLORIDE 20 MG/1
CAPSULE ORAL
Qty: 90 CAPSULE | Refills: 2 | Status: SHIPPED | OUTPATIENT
Start: 2018-02-15 | End: 2018-05-16 | Stop reason: SDUPTHER

## 2018-02-15 RX ORDER — BUPROPION HYDROCHLORIDE 450 MG/1
450 TABLET, FILM COATED, EXTENDED RELEASE ORAL EVERY MORNING
Qty: 30 TABLET | Refills: 2 | Status: SHIPPED | OUTPATIENT
Start: 2018-02-15 | End: 2018-05-16 | Stop reason: SDUPTHER

## 2018-02-15 RX ORDER — ARIPIPRAZOLE 5 MG/1
5 TABLET ORAL DAILY
Qty: 30 TABLET | Refills: 2 | Status: SHIPPED | OUTPATIENT
Start: 2018-02-15 | End: 2018-05-16 | Stop reason: SDUPTHER

## 2018-02-15 RX ORDER — PRAZOSIN HYDROCHLORIDE 2 MG/1
6 CAPSULE ORAL NIGHTLY
Qty: 90 CAPSULE | Refills: 2 | Status: SHIPPED | OUTPATIENT
Start: 2018-02-15 | End: 2018-05-16 | Stop reason: SDUPTHER

## 2018-02-15 NOTE — PROGRESS NOTES
Outpatient Psychiatric Progress Note    CC: f/u MDD, PTSD    HX:  Delphine was seen for follow-up for depression and PTSD today.  She reports her mood has gotten more depressed.  She is isolating home and specifically in her room.  She has not been out of the house in nearly a month except for a doctor's appointment and this appointment.  She discussed a sense of dread about coming to the appointment.  Sleep is good with the medications, no nightmares.  Appetite is fair.  Energy level is low, concentration is low.  She is not suicidal or homicidal thoughts.  No auditory or visual hallucinations  I have reviewed pt's allergies and current medications.     The patient also reported using Klonopin but I given her for her child's wedding last summer today since this is the first time she's been out in a month.  She says she was only given 5 tablets and still has 2 or 3 tablets left.    Patient also discussed that her dad's 80th birthday is coming up in [place].  She is getting pressure from her family to go however she does not believe she can do it because of her depression and paranoia.  Current Outpatient Prescriptions   Medication Sig Dispense Refill   • ARIPiprazole (ABILIFY) 5 MG tablet Take 1 tablet by mouth Daily. 30 tablet 2   • atorvastatin (LIPITOR) 20 MG tablet      • buPROPion XL (WELLBUTRIN XL) 300 MG 24 hr tablet Take 1 tablet by mouth Every Morning. 30 tablet 2   • FLUoxetine (PROzac) 20 MG capsule Take 3 daily 90 capsule 2   • HYDROcodone-acetaminophen (NORCO) 7.5-325 MG per tablet      • levothyroxine (SYNTHROID, LEVOTHROID) 100 MCG tablet Daily.     • methylphenidate (RITALIN) 20 MG tablet Take 1.5 tabs po BID 90 tablet 0   • omeprazole (priLOSEC) 20 MG capsule      • prazosin (MINIPRESS) 2 MG capsule Take 3 capsules by mouth Every Night. 90 capsule 2   • QUEtiapine (SEROquel) 50 MG tablet 1-2 tabs nightly as needed for sleep 60 tablet 2     No current facility-administered medications for this visit.   "      Allergies   Allergen Reactions   • Codeine Hives and Itching     Review of Systems   Constitutional: Negative.    Cardiovascular: Negative.    Gastrointestinal: Negative.    Endocrine: Negative.    Genitourinary: Negative.    Neurological: Negative.    Psychiatric/Behavioral: Negative for dysphoric mood and sleep disturbance.     /68  Pulse (!) 125  Ht 167.6 cm (65.98\")  Wt 112 kg (247 lb)  BMI 39.89 kg/m2    MENTAL STATUS EXAM:  Appearance:Neatly, casually dressed, good hygeine.   Cooperation:Cooperative  Orientation: Ox4  Gait and station stable.   Psychomotor: No psychomotor agitation/retardation, No EPS, No motor tics  Speech-normal rate, amount.  Mood \"odnot go\"   Affect-congruent/appropriate.  Thought Content-goal directed, no delusional material present  Thought process-linear, organized.  Suicidality: No SI  Homicidality: No HI  Perception: No AH/VH  Memory is intact for recent and remote events  Concentration: good  Impulse control-good  Insight-fair   Judgement-fair    ASSESSMENT AND PLAN  Encounter Diagnoses   Name Primary?   • Medication management    • Severe episode of recurrent major depressive disorder, with psychotic features Yes   • Post traumatic stress disorder (PTSD)        Plan:  1. Increase Wellbutrin XL to 450 mg daily for depression   2.  Continue Ritalin 30 mg twice a day, Prozac 20 mg daily, Abilify 5 mg for depression  3. Continue prazosin 6mg nightly, seroquel 50mg  for PTSD  4.  Return to clinic in 3 months or sooner if needed  TIME IN:330P  TIME OUT:342P    Bakari Montanez MD  3:33 PM  "

## 2018-03-21 RX ORDER — METHYLPHENIDATE HYDROCHLORIDE 20 MG/1
TABLET ORAL
Qty: 90 TABLET | Refills: 0 | Status: SHIPPED | OUTPATIENT
Start: 2018-03-21 | End: 2018-04-18 | Stop reason: SDUPTHER

## 2018-04-19 RX ORDER — METHYLPHENIDATE HYDROCHLORIDE 20 MG/1
TABLET ORAL
Qty: 90 TABLET | Refills: 0 | Status: SHIPPED | OUTPATIENT
Start: 2018-04-19 | End: 2018-05-16 | Stop reason: SDUPTHER

## 2018-05-16 ENCOUNTER — TELEPHONE (OUTPATIENT)
Dept: PSYCHIATRY | Facility: CLINIC | Age: 58
End: 2018-05-16

## 2018-05-16 ENCOUNTER — OFFICE VISIT (OUTPATIENT)
Dept: PSYCHIATRY | Facility: CLINIC | Age: 58
End: 2018-05-16

## 2018-05-16 VITALS
HEIGHT: 66 IN | BODY MASS INDEX: 37.77 KG/M2 | DIASTOLIC BLOOD PRESSURE: 73 MMHG | HEART RATE: 89 BPM | WEIGHT: 235 LBS | SYSTOLIC BLOOD PRESSURE: 127 MMHG

## 2018-05-16 DIAGNOSIS — F43.10 POST TRAUMATIC STRESS DISORDER (PTSD): ICD-10-CM

## 2018-05-16 DIAGNOSIS — F33.3 SEVERE EPISODE OF RECURRENT MAJOR DEPRESSIVE DISORDER, WITH PSYCHOTIC FEATURES (HCC): Primary | ICD-10-CM

## 2018-05-16 PROCEDURE — 99213 OFFICE O/P EST LOW 20 MIN: CPT | Performed by: PSYCHIATRY & NEUROLOGY

## 2018-05-16 RX ORDER — PRAZOSIN HYDROCHLORIDE 2 MG/1
6 CAPSULE ORAL NIGHTLY
Qty: 90 CAPSULE | Refills: 2 | Status: SHIPPED | OUTPATIENT
Start: 2018-05-16 | End: 2018-07-06 | Stop reason: SDUPTHER

## 2018-05-16 RX ORDER — FLUOXETINE HYDROCHLORIDE 20 MG/1
CAPSULE ORAL
Qty: 90 CAPSULE | Refills: 2 | Status: SHIPPED | OUTPATIENT
Start: 2018-05-16 | End: 2018-07-06 | Stop reason: SDUPTHER

## 2018-05-16 RX ORDER — METHYLPHENIDATE HYDROCHLORIDE 20 MG/1
TABLET ORAL
Qty: 90 TABLET | Refills: 0 | Status: SHIPPED | OUTPATIENT
Start: 2018-05-16 | End: 2018-06-18 | Stop reason: SDUPTHER

## 2018-05-16 RX ORDER — ARIPIPRAZOLE 5 MG/1
5 TABLET ORAL DAILY
Qty: 30 TABLET | Refills: 2 | Status: SHIPPED | OUTPATIENT
Start: 2018-05-16 | End: 2018-07-06 | Stop reason: SDUPTHER

## 2018-05-16 RX ORDER — BUPROPION HYDROCHLORIDE 150 MG/1
450 TABLET ORAL EVERY MORNING
Qty: 90 TABLET | Refills: 2 | Status: SHIPPED | OUTPATIENT
Start: 2018-05-16 | End: 2018-07-06 | Stop reason: SDUPTHER

## 2018-05-16 RX ORDER — QUETIAPINE FUMARATE 100 MG/1
100 TABLET, FILM COATED ORAL NIGHTLY
Qty: 30 TABLET | Refills: 2 | Status: SHIPPED | OUTPATIENT
Start: 2018-05-16 | End: 2018-07-06 | Stop reason: SDUPTHER

## 2018-06-19 RX ORDER — METHYLPHENIDATE HYDROCHLORIDE 20 MG/1
TABLET ORAL
Qty: 90 TABLET | Refills: 0 | Status: SHIPPED | OUTPATIENT
Start: 2018-06-19 | End: 2018-07-06 | Stop reason: SDUPTHER

## 2018-07-09 RX ORDER — ARIPIPRAZOLE 5 MG/1
5 TABLET ORAL DAILY
Qty: 30 TABLET | Refills: 2 | Status: SHIPPED | OUTPATIENT
Start: 2018-07-09 | End: 2018-09-24

## 2018-07-09 RX ORDER — BUPROPION HYDROCHLORIDE 150 MG/1
450 TABLET ORAL EVERY MORNING
Qty: 90 TABLET | Refills: 2 | Status: SHIPPED | OUTPATIENT
Start: 2018-07-09 | End: 2018-09-24 | Stop reason: SDUPTHER

## 2018-07-09 RX ORDER — FLUOXETINE HYDROCHLORIDE 20 MG/1
CAPSULE ORAL
Qty: 90 CAPSULE | Refills: 2 | Status: SHIPPED | OUTPATIENT
Start: 2018-07-09 | End: 2018-09-24

## 2018-07-09 RX ORDER — PRAZOSIN HYDROCHLORIDE 2 MG/1
6 CAPSULE ORAL NIGHTLY
Qty: 90 CAPSULE | Refills: 2 | Status: SHIPPED | OUTPATIENT
Start: 2018-07-09 | End: 2018-09-24 | Stop reason: SDUPTHER

## 2018-07-09 RX ORDER — METHYLPHENIDATE HYDROCHLORIDE 20 MG/1
TABLET ORAL
Qty: 90 TABLET | Refills: 0 | Status: SHIPPED | OUTPATIENT
Start: 2018-07-09 | End: 2018-08-16 | Stop reason: SDUPTHER

## 2018-07-09 RX ORDER — QUETIAPINE FUMARATE 100 MG/1
100 TABLET, FILM COATED ORAL NIGHTLY
Qty: 30 TABLET | Refills: 2 | Status: SHIPPED | OUTPATIENT
Start: 2018-07-09 | End: 2018-09-24 | Stop reason: SDUPTHER

## 2018-08-16 RX ORDER — METHYLPHENIDATE HYDROCHLORIDE 20 MG/1
TABLET ORAL
Qty: 90 TABLET | Refills: 0 | Status: SHIPPED | OUTPATIENT
Start: 2018-08-16 | End: 2018-08-27 | Stop reason: SDUPTHER

## 2018-08-27 ENCOUNTER — OFFICE VISIT (OUTPATIENT)
Dept: PSYCHIATRY | Facility: CLINIC | Age: 58
End: 2018-08-27

## 2018-08-27 VITALS
SYSTOLIC BLOOD PRESSURE: 140 MMHG | WEIGHT: 220 LBS | BODY MASS INDEX: 35.36 KG/M2 | HEART RATE: 113 BPM | DIASTOLIC BLOOD PRESSURE: 81 MMHG | HEIGHT: 66 IN

## 2018-08-27 DIAGNOSIS — Z79.899 MEDICATION MANAGEMENT: ICD-10-CM

## 2018-08-27 DIAGNOSIS — F33.3 SEVERE EPISODE OF RECURRENT MAJOR DEPRESSIVE DISORDER, WITH PSYCHOTIC FEATURES (HCC): Primary | ICD-10-CM

## 2018-08-27 DIAGNOSIS — F40.01 PANIC DISORDER WITH AGORAPHOBIA: ICD-10-CM

## 2018-08-27 DIAGNOSIS — F43.10 POST TRAUMATIC STRESS DISORDER (PTSD): ICD-10-CM

## 2018-08-27 PROCEDURE — 99214 OFFICE O/P EST MOD 30 MIN: CPT | Performed by: NURSE PRACTITIONER

## 2018-08-27 PROCEDURE — 90833 PSYTX W PT W E/M 30 MIN: CPT | Performed by: NURSE PRACTITIONER

## 2018-08-27 RX ORDER — METHYLPHENIDATE HYDROCHLORIDE 20 MG/1
TABLET ORAL
Qty: 90 TABLET | Refills: 0 | Status: SHIPPED | OUTPATIENT
Start: 2018-08-27 | End: 2018-09-24 | Stop reason: SDUPTHER

## 2018-08-27 NOTE — PROGRESS NOTES
"Outpatient Psychiatric Progress Note    CC: f/u depression. This is first meeting between undersigned and pt since transfer of care from Dr. Montanez.    HX:  Delphine was seen for follow-up today.  She hasn't been seen in 3 months as the first appointment was rescheduled and the second cancellation was due to having teeth pulled and fitted for dentures. Pt reports increased anxiety due to psychosocial stressors including her father breaking hip and she has difficulty leaving house but traveled up to [place] to be with him. Notes panic episodes in car ride. Reports difficulty being at today's visit \"I just want to get back home.\" She reports this anxiety has worsened since the last visit with Dr. Montanez. She is tolerating meds, denies any SEs. She has ongoing depression but denies any SI/HI/AH/VH. Her mother was mentally ill and reports \"I would never do that to my family.\" Appetite is fair, tolerating diet. Reports difficulty when eating as she got the dentures on Aug 1 and still struggles with swelling/sensitivity. She is eating soups, jello. She often has guilty feelings yet not done anything wrong.  Sleep is better with medication. She frequent intermittent awakenings. Reports she quit taking Abilify as she thought it caused nightmares and didn't realize prazosin was for the nightmares. She has sleep apnea but feels like she is smotherin when she wears the Cpap and could never get used to it so quit using it. She is to see PCP next week and plans to discuss a different face piece if possible.   Pt has tried and failed Zoloft, Effexor XR, currently on Prozac with minimal efficacy, Wellbutrin XL not working as well.      Husseinnoah Cruz [facility] was her psychotherapist and helpful but she has to much fear/panic/anxiety about the appointments.           I have reviewed pt's allergies and current medications.   Current Outpatient Prescriptions   Medication Sig Dispense Refill   • ARIPiprazole (ABILIFY) 5 MG tablet Take 1 " "tablet by mouth Daily. 30 tablet 2   • atorvastatin (LIPITOR) 20 MG tablet      • buPROPion XL (WELLBUTRIN XL) 150 MG 24 hr tablet Take 3 tablets by mouth Every Morning. 90 tablet 2   • FLUoxetine (PROzac) 20 MG capsule Take 3 daily 90 capsule 2   • HYDROcodone-acetaminophen (NORCO) 7.5-325 MG per tablet      • levothyroxine (SYNTHROID, LEVOTHROID) 100 MCG tablet Daily.     • methylphenidate (RITALIN) 20 MG tablet Take 1.5 tabs po BID 90 tablet 0   • omeprazole (priLOSEC) 20 MG capsule      • prazosin (MINIPRESS) 2 MG capsule Take 3 capsules by mouth Every Night. 90 capsule 2   • QUEtiapine (SEROquel) 100 MG tablet Take 1 tablet by mouth Every Night. 1-2 tabs nightly as needed for sleep 30 tablet 2     No current facility-administered medications for this visit.        Allergies   Allergen Reactions   • Codeine Hives and Itching     Review of Systems   Constitutional: Positive for fatigue. Negative for activity change and appetite change.   HENT: Negative.         Mouth pain r/t new dentures   Eyes: Negative.  Negative for visual disturbance.   Respiratory: Negative.    Cardiovascular: Negative.    Gastrointestinal: Negative.  Negative for nausea.   Endocrine: Negative.    Genitourinary: Negative.    Musculoskeletal: Negative.  Negative for arthralgias.   Skin: Negative.    Allergic/Immunologic: Negative.    Neurological: Negative.  Negative for dizziness, seizures and headaches.   Hematological: Negative.    Psychiatric/Behavioral: Positive for dysphoric mood and sleep disturbance. Negative for agitation, behavioral problems, confusion, decreased concentration, hallucinations, self-injury and suicidal ideas. The patient is nervous/anxious. The patient is not hyperactive.      /81   Pulse 113   Ht 167.6 cm (65.98\")   Wt 99.8 kg (220 lb)   BMI 35.53 kg/m²     MENTAL STATUS EXAM:  Appearance:Neatly, casually dressed, good hygeine.   Cooperation:Cooperative  Orientation: Ox4  Gait and station stable. " "  Psychomotor: No psychomotor agitation/retardation, No EPS, No motor tics  Speech-normal rate, amount.  Mood \"I'm okay--just anxious--I need to go\"   Affect-constricted, anxious appearing  Thought Content-goal directed, no delusional material present  Thought process-linear, organized.  Suicidality: No SI  Homicidality: No HI  Perception: No AH/VH  Memory is intact for recent and remote events  Concentration: good  Impulse control-good  Insight- limited  Judgement-fair    ASSESSMENT AND PLAN  Encounter Diagnoses   Name Primary?   • Severe episode of recurrent major depressive disorder, with psychotic features (CMS/HCC) Yes   • Post traumatic stress disorder (PTSD)    • Panic disorder with agoraphobia    • Medication management    Functionality: pt having significant impairment in important areas of daily functioning. She is having increased paranoia noted since stopping the Abilify.   Prognosis: Guarded dependent on medication/follow up and treatment plan compliance.  Body mass index is 35.53 kg/m².  Patient was educated on healthier and more balanced diet choices and encouraged exercise within physical limitations.    25 min spent face to face for medication management  Plan:  1. Begin Viibryd 10mg and increase to 20mg in 14 days. Taper off Prozac, take 40mg x1 week then 20mg x1 week then stop.   2. Continue Ritalin 20mg  3. Restart Abilify  4. Continue Seroquel for sleep, Wellbutrin XL for mood, prazosin for nightmares  5. Once symptoms are decreased in severity pt agrees to resume psychotherapy with [facility]  11:05am-11:35am Assisted patient in processing pt's anxiety/paranoia that she is being followed. Acknowledged and normalized patient’s thoughts, feelings, and concerns. Utilized cognitive behavioral therapy including motivational interviewing to assist the patient in recognizing more appropriate coping mechanisms when she becomes agitated/anxious which are proven effective in reducing the severity " of frequency of symptoms. Strongly urged her to continue to eat more well balanced and healthier foods in reducing further health risks. There was unconditional positive regard toward patient. Allowed patient to freely discuss issues without interruption or judgment.      Thu Jackson, APRN

## 2018-08-28 ENCOUNTER — TELEPHONE (OUTPATIENT)
Dept: PSYCHIATRY | Facility: CLINIC | Age: 58
End: 2018-08-28

## 2018-08-28 RX ORDER — VILAZODONE HYDROCHLORIDE 20 MG/1
20 TABLET ORAL DAILY
Qty: 30 TABLET | Refills: 0 | Status: SHIPPED | OUTPATIENT
Start: 2018-08-28 | End: 2018-09-24 | Stop reason: SDUPTHER

## 2018-09-24 ENCOUNTER — OFFICE VISIT (OUTPATIENT)
Dept: PSYCHIATRY | Facility: CLINIC | Age: 58
End: 2018-09-24

## 2018-09-24 VITALS
HEART RATE: 89 BPM | SYSTOLIC BLOOD PRESSURE: 123 MMHG | HEIGHT: 66 IN | DIASTOLIC BLOOD PRESSURE: 77 MMHG | BODY MASS INDEX: 35.36 KG/M2 | WEIGHT: 220 LBS

## 2018-09-24 DIAGNOSIS — F40.01 PANIC DISORDER WITH AGORAPHOBIA: ICD-10-CM

## 2018-09-24 DIAGNOSIS — R73.09 OTHER ABNORMAL GLUCOSE: ICD-10-CM

## 2018-09-24 DIAGNOSIS — F33.3 SEVERE EPISODE OF RECURRENT MAJOR DEPRESSIVE DISORDER, WITH PSYCHOTIC FEATURES (HCC): Primary | ICD-10-CM

## 2018-09-24 DIAGNOSIS — F31.9 BIPOLAR 1 DISORDER (HCC): ICD-10-CM

## 2018-09-24 DIAGNOSIS — F43.10 POST TRAUMATIC STRESS DISORDER (PTSD): ICD-10-CM

## 2018-09-24 PROCEDURE — 90833 PSYTX W PT W E/M 30 MIN: CPT | Performed by: NURSE PRACTITIONER

## 2018-09-24 PROCEDURE — 99214 OFFICE O/P EST MOD 30 MIN: CPT | Performed by: NURSE PRACTITIONER

## 2018-09-24 RX ORDER — VILAZODONE HYDROCHLORIDE 20 MG/1
20 TABLET ORAL DAILY
Qty: 90 TABLET | Refills: 0 | Status: SHIPPED | OUTPATIENT
Start: 2018-09-24 | End: 2018-10-25 | Stop reason: SDUPTHER

## 2018-09-24 RX ORDER — BUPROPION HYDROCHLORIDE 150 MG/1
450 TABLET ORAL EVERY MORNING
Qty: 90 TABLET | Refills: 2 | Status: SHIPPED | OUTPATIENT
Start: 2018-09-24 | End: 2018-12-20 | Stop reason: SDUPTHER

## 2018-09-24 RX ORDER — PRAZOSIN HYDROCHLORIDE 2 MG/1
6 CAPSULE ORAL NIGHTLY
Qty: 90 CAPSULE | Refills: 2 | Status: SHIPPED | OUTPATIENT
Start: 2018-09-24 | End: 2018-12-20 | Stop reason: SDUPTHER

## 2018-09-24 RX ORDER — QUETIAPINE FUMARATE 100 MG/1
100 TABLET, FILM COATED ORAL NIGHTLY
Qty: 30 TABLET | Refills: 2 | Status: SHIPPED | OUTPATIENT
Start: 2018-09-24 | End: 2018-10-25 | Stop reason: SDUPTHER

## 2018-09-24 RX ORDER — METHYLPHENIDATE HYDROCHLORIDE 20 MG/1
TABLET ORAL
Qty: 90 TABLET | Refills: 0 | Status: SHIPPED | OUTPATIENT
Start: 2018-09-24 | End: 2018-10-15 | Stop reason: SDUPTHER

## 2018-09-24 NOTE — PROGRESS NOTES
"Outpatient Psychiatric Progress Note     CC: f/u depression/anxiety      HX:  Delphine was seen for follow-up today.  Pt is doing very well. Her sleep is still poor. She went to F/U with PCP and Dr wasn't in that day so it was rescheduled to Oct. 10. States it was good that she made the first appt \"because it is so hard for me to leave the house.\" Pt states she feels better inside but hates to leave house \"I will stay and sit playing "[employer]" for 12 hours at a time.\" States she has been doing this for a while and it was occurring well before her last appointment. She is still adjusting to her dentures. No other stressors noted. States within a couple hours of waking in the morning she becomes shaky/weak. Ongoing anxiety/depressive symptoms. States she worries about things out of her control and thinks the worst will happen. States she has to get home soon or there will be a bad car wreck, etc. She is tearful at times.   Pt is tolerating meds, denies any SEs, doesn't feel they are efficacious, minimal improvements    Pt has tried and failed Zoloft, Effexor XR, Cymbalta currently on Prozac with minimal efficacy, Wellbutrin XL not working as well.     States Hussein Cruz LCSW was her psychotherapist and helpful but she has to much fear/panic/anxiety about the appointments.       I have reviewed pt's allergies and current medications.   Current Outpatient Prescriptions   Medication Sig Dispense Refill   • atorvastatin (LIPITOR) 20 MG tablet      • buPROPion XL (WELLBUTRIN XL) 150 MG 24 hr tablet Take 3 tablets by mouth Every Morning. 90 tablet 2   • HYDROcodone-acetaminophen (NORCO) 7.5-325 MG per tablet      • levothyroxine (SYNTHROID, LEVOTHROID) 100 MCG tablet Daily.     • methylphenidate (RITALIN) 20 MG tablet Take 1.5 tabs po BID 90 tablet 0   • omeprazole (priLOSEC) 20 MG capsule      • prazosin (MINIPRESS) 2 MG capsule Take 3 capsules by mouth Every Night. 90 capsule 2   • QUEtiapine (SEROquel) 100 MG tablet Take 1 " "tablet by mouth Every Night. 1-2 tabs nightly as needed for sleep 30 tablet 2   • vilazodone (VIIBRYD) 20 MG tablet tablet Take 1 tablet by mouth Daily. 90 tablet 0   • Cariprazine HCl (VRAYLAR) 1.5 MG capsule capsule Take 1 capsule by mouth Daily. 30 capsule 1     No current facility-administered medications for this visit.        Allergies   Allergen Reactions   • Codeine Hives and Itching     Review of Systems   Constitutional: Positive for fatigue. Negative for activity change and appetite change.   HENT: Negative.         Mouth pain r/t new dentures   Eyes: Negative.  Negative for visual disturbance.   Respiratory: Negative.    Cardiovascular: Negative.    Gastrointestinal: Negative.  Negative for nausea.   Endocrine: Negative.    Genitourinary: Negative.    Musculoskeletal: Negative.  Negative for arthralgias.   Skin: Negative.    Allergic/Immunologic: Negative.    Neurological: Negative.  Negative for dizziness, seizures and headaches.   Hematological: Negative.    Psychiatric/Behavioral: Positive for dysphoric mood and sleep disturbance. Negative for agitation, behavioral problems, confusion, decreased concentration, hallucinations, self-injury and suicidal ideas. The patient is nervous/anxious. The patient is not hyperactive.      /77   Pulse 89   Ht 167.6 cm (65.98\")   Wt 99.8 kg (220 lb)   BMI 35.53 kg/m²     MENTAL STATUS EXAM:  Appearance:Neatly, casually dressed, good hygeine.   Cooperation:Cooperative  Orientation: Ox4  Gait and station stable.   Psychomotor: No psychomotor agitation/retardation, No EPS, No motor tics  Speech-normal rate, amount.  Mood \"I'm okay--just anxious--I need to go\"   Affect-constricted, anxious appearing  Thought Content-goal directed, no delusional material present  Thought process-linear, organized.  Suicidality: No SI  Homicidality: No HI  Perception: No AH/VH  Memory is intact for recent and remote events  Concentration: good  Impulse control-good  Insight- " limited  Judgement-fair    ASSESSMENT AND PLAN  Encounter Diagnoses   Name Primary?   • Severe episode of recurrent major depressive disorder, with psychotic features (CMS/HCC) Yes   • Post traumatic stress disorder (PTSD)    • Panic disorder with agoraphobia    • Bipolar 1 disorder (CMS/HCC)    • Other abnormal glucose     Functionality: pt having significant impairment in important areas of daily functioning.  Prognosis: Guarded dependent on medication/follow up and treatment plan compliance.  Body mass index is 35.53 kg/m².  Patient was educated on healthier and more balanced diet choices and encouraged exercise within physical limitations.    25 min spent face to face for medication management  Plan:  1. Continue Viibryd for depression/anxiety  2. Continue Ritalin 20mg  3. DCt Abilify. Begin Vraylar for bipolar symptoms including impulsive behaviors  4. Continue Seroquel for sleep, Wellbutrin XL for mood, prazosin for nightmares  5. Once symptoms are decreased in severity pt agrees to resume psychotherapy with Hussein Cruz LCSW  6. Supportive psychotherapy  RTC 4 weeks or sooner if needed. Call with questions/concerns.     In 10:00am  Out 10:55    BHUMI RIZO REVIEWED    SUPPORTIVE PSYCHOTHERAPY 10:25am-10:55 am Assisted patient in processing pt's anxiety/paranoia that she is being followed. Acknowledged and normalized patient’s thoughts, feelings, and concerns. Utilized cognitive behavioral therapy including motivational interviewing to assist the patient in recognizing more appropriate coping mechanisms when she becomes agitated/anxious which are proven effective in reducing the severity of frequency of symptoms. Strongly urged her to continue to eat more well balanced and healthier foods in reducing further health risks. There was unconditional positive regard toward patient. Allowed patient to freely discuss issues without interruption or judgment.      Thu Jackson, PO

## 2018-10-15 DIAGNOSIS — F33.3 SEVERE EPISODE OF RECURRENT MAJOR DEPRESSIVE DISORDER, WITH PSYCHOTIC FEATURES (HCC): ICD-10-CM

## 2018-10-17 RX ORDER — METHYLPHENIDATE HYDROCHLORIDE 20 MG/1
TABLET ORAL
Qty: 90 TABLET | Refills: 0 | Status: SHIPPED | OUTPATIENT
Start: 2018-10-17 | End: 2018-12-20 | Stop reason: SDUPTHER

## 2018-10-25 ENCOUNTER — OFFICE VISIT (OUTPATIENT)
Dept: PSYCHIATRY | Facility: CLINIC | Age: 58
End: 2018-10-25

## 2018-10-25 VITALS
HEART RATE: 101 BPM | SYSTOLIC BLOOD PRESSURE: 139 MMHG | WEIGHT: 221 LBS | DIASTOLIC BLOOD PRESSURE: 79 MMHG | BODY MASS INDEX: 35.52 KG/M2 | HEIGHT: 66 IN

## 2018-10-25 DIAGNOSIS — Z79.899 MEDICATION MANAGEMENT: ICD-10-CM

## 2018-10-25 DIAGNOSIS — F51.05 INSOMNIA DUE TO MENTAL DISORDER: ICD-10-CM

## 2018-10-25 DIAGNOSIS — F43.10 POST TRAUMATIC STRESS DISORDER (PTSD): ICD-10-CM

## 2018-10-25 DIAGNOSIS — F33.3 SEVERE EPISODE OF RECURRENT MAJOR DEPRESSIVE DISORDER, WITH PSYCHOTIC FEATURES (HCC): Primary | ICD-10-CM

## 2018-10-25 DIAGNOSIS — F40.01 PANIC DISORDER WITH AGORAPHOBIA: ICD-10-CM

## 2018-10-25 PROCEDURE — 99214 OFFICE O/P EST MOD 30 MIN: CPT | Performed by: NURSE PRACTITIONER

## 2018-10-25 RX ORDER — QUETIAPINE FUMARATE 100 MG/1
100-200 TABLET, FILM COATED ORAL NIGHTLY PRN
Qty: 180 TABLET | Refills: 1 | Status: SHIPPED | OUTPATIENT
Start: 2018-10-25 | End: 2019-07-09 | Stop reason: SDUPTHER

## 2018-10-25 RX ORDER — FLUTICASONE PROPIONATE 50 MCG
2 SPRAY, SUSPENSION (ML) NASAL DAILY
COMMUNITY
Start: 2018-10-10 | End: 2022-03-28

## 2018-10-25 RX ORDER — VILAZODONE HYDROCHLORIDE 20 MG/1
20 TABLET ORAL DAILY
Qty: 90 TABLET | Refills: 0 | Status: SHIPPED | OUTPATIENT
Start: 2018-10-25 | End: 2018-12-20 | Stop reason: SDUPTHER

## 2018-10-25 NOTE — PROGRESS NOTES
Outpatient Psychiatric Progress Note     CC: f/u depression/anxiety      HX:   Pt reports she is getting out of the house and going to the gym walking on the treadmill, which is something she wouldn't have ever been able to do in 6-7 years. She states the Viibryd has really helped her. She states she is frustrated as she doesn't see the weight loss as far as the scale goes. States she is significantly improved, more hopeful. She denies any significant anxiety/depression today. Denies any SI/HI/AH/VH. She states her anxiety/worry is less intense. No new medical concerns, stressors. Her appetite is good, tolerating diet. No panic episodes. Sleeping well, denies nightmares with use of prazosin.     Pt is tolerating meds, denies any SEs, doesn't feel they are efficacious, minimal improvements    Pt has tried and failed Zoloft, Effexor XR, Cymbalta currently on Prozac with minimal efficacy, Wellbutrin XL not working as well.     States Hussein Cruz LCSW was her psychotherapist and helpful but she has to much fear/panic/anxiety about the appointments.       I have reviewed pt's allergies and current medications.   Current Outpatient Prescriptions   Medication Sig Dispense Refill   • atorvastatin (LIPITOR) 20 MG tablet      • buPROPion XL (WELLBUTRIN XL) 150 MG 24 hr tablet Take 3 tablets by mouth Every Morning. 90 tablet 2   • fluticasone (FLONASE) 50 MCG/ACT nasal spray 2 sprays into the nostril(s) as directed by provider Daily.     • HYDROcodone-acetaminophen (NORCO) 7.5-325 MG per tablet      • levothyroxine (SYNTHROID, LEVOTHROID) 100 MCG tablet Daily.     • methylphenidate (RITALIN) 20 MG tablet Take 1.5 tabs po BID 90 tablet 0   • omeprazole (priLOSEC) 20 MG capsule      • prazosin (MINIPRESS) 2 MG capsule Take 3 capsules by mouth Every Night. 90 capsule 2   • QUEtiapine (SEROquel) 100 MG tablet Take 1-2 tablets by mouth At Night As Needed (sleep). 180 tablet 1   • vilazodone (VIIBRYD) 20 MG tablet tablet Take 1  "tablet by mouth Daily. 90 tablet 0     No current facility-administered medications for this visit.        Allergies   Allergen Reactions   • Codeine Hives and Itching     Review of Systems   Constitutional: Positive for fatigue. Negative for activity change and appetite change.   HENT: Negative.         Mouth pain r/t new dentures   Eyes: Negative.  Negative for visual disturbance.   Respiratory: Negative.    Cardiovascular: Negative.    Gastrointestinal: Negative.  Negative for nausea.   Endocrine: Negative.    Genitourinary: Negative.    Musculoskeletal: Negative.  Negative for arthralgias.   Skin: Negative.    Allergic/Immunologic: Negative.    Neurological: Negative.  Negative for dizziness, seizures and headaches.   Hematological: Negative.    Psychiatric/Behavioral: Positive for dysphoric mood and sleep disturbance. Negative for agitation, behavioral problems, confusion, decreased concentration, hallucinations, self-injury and suicidal ideas. The patient is nervous/anxious. The patient is not hyperactive.      /79   Pulse 101   Ht 167.6 cm (65.98\")   Wt 100 kg (221 lb)   BMI 35.69 kg/m²     MENTAL STATUS EXAM:  Appearance:Neatly, casually dressed, good hygeine.   Cooperation:Cooperative  Orientation: Ox4  Gait and station stable.   Psychomotor: No psychomotor agitation/retardation, No EPS, No motor tics  Speech-normal rate, amount.  Mood \"I'm okay--just anxious--I need to go\"   Affect-constricted, anxious appearing  Thought Content-goal directed, no delusional material present  Thought process-linear, organized.  Suicidality: No SI  Homicidality: No HI  Perception: No AH/VH  Memory is intact for recent and remote events  Concentration: good  Impulse control-good  Insight- limited  Judgement-fair    ASSESSMENT AND PLAN  Encounter Diagnoses   Name Primary?   • Severe episode of recurrent major depressive disorder, with psychotic features (CMS/HCC) Yes   • Post traumatic stress disorder (PTSD)    • " Panic disorder with agoraphobia    • Medication management    • Insomnia due to mental disorder    Functionality: pt having significant improvements in important areas of daily functioning.  Prognosis: Guarded dependent on medication/follow up and treatment plan compliance.  Body mass index is 35.69 kg/m².  Patient was educated on healthier and more balanced diet choices and encouraged exercise within physical limitations.    25 min spent face to face for medication management  Plan:  1. Continue Viibryd for depression/anxiety  2. Continue Ritalin 20mg  3. DC Vrayler due to cost concerns  4. Continue Seroquel for sleep, Wellbutrin XL for mood, prazosin for nightmares  5. Once symptoms are decreased in severity pt agrees to resume psychotherapy with Hussein Cruz LCSW  6. Supportive psychotherapy  RTC 8 weeks or sooner if needed. Call with questions/concerns.   25 min spent face to face with patient    BHUMI RIZO REVIEWED      PO Guzman

## 2018-12-20 ENCOUNTER — OFFICE VISIT (OUTPATIENT)
Dept: PSYCHIATRY | Facility: CLINIC | Age: 58
End: 2018-12-20

## 2018-12-20 VITALS
SYSTOLIC BLOOD PRESSURE: 140 MMHG | WEIGHT: 221.4 LBS | BODY MASS INDEX: 35.58 KG/M2 | DIASTOLIC BLOOD PRESSURE: 92 MMHG | HEIGHT: 66 IN

## 2018-12-20 DIAGNOSIS — F40.01 PANIC DISORDER WITH AGORAPHOBIA: ICD-10-CM

## 2018-12-20 DIAGNOSIS — F33.3 SEVERE EPISODE OF RECURRENT MAJOR DEPRESSIVE DISORDER, WITH PSYCHOTIC FEATURES (HCC): Primary | ICD-10-CM

## 2018-12-20 DIAGNOSIS — F51.05 INSOMNIA DUE TO MENTAL DISORDER: ICD-10-CM

## 2018-12-20 DIAGNOSIS — F43.10 POST TRAUMATIC STRESS DISORDER (PTSD): ICD-10-CM

## 2018-12-20 DIAGNOSIS — Z79.899 MEDICATION MANAGEMENT: ICD-10-CM

## 2018-12-20 PROCEDURE — 90792 PSYCH DIAG EVAL W/MED SRVCS: CPT | Performed by: NURSE PRACTITIONER

## 2018-12-20 RX ORDER — PRAZOSIN HYDROCHLORIDE 2 MG/1
6 CAPSULE ORAL NIGHTLY
Qty: 90 CAPSULE | Refills: 2 | Status: SHIPPED | OUTPATIENT
Start: 2018-12-20 | End: 2019-05-17 | Stop reason: SDUPTHER

## 2018-12-20 RX ORDER — METHYLPHENIDATE HYDROCHLORIDE 20 MG/1
TABLET ORAL
Qty: 90 TABLET | Refills: 0 | Status: SHIPPED | OUTPATIENT
Start: 2018-12-20 | End: 2019-01-15 | Stop reason: SDUPTHER

## 2018-12-20 RX ORDER — BUPROPION HYDROCHLORIDE 150 MG/1
450 TABLET ORAL EVERY MORNING
Qty: 90 TABLET | Refills: 2 | Status: SHIPPED | OUTPATIENT
Start: 2018-12-20 | End: 2019-05-17 | Stop reason: SDUPTHER

## 2018-12-20 RX ORDER — VILAZODONE HYDROCHLORIDE 40 MG/1
40 TABLET ORAL DAILY
Qty: 90 TABLET | Refills: 0 | Status: SHIPPED | OUTPATIENT
Start: 2018-12-20 | End: 2019-07-09

## 2018-12-20 RX ORDER — DIAZEPAM 2 MG/1
2 TABLET ORAL 2 TIMES DAILY PRN
Qty: 60 TABLET | Refills: 0 | Status: SHIPPED | OUTPATIENT
Start: 2018-12-20 | End: 2019-05-17 | Stop reason: SDUPTHER

## 2018-12-20 NOTE — PROGRESS NOTES
"Outpatient Psychiatric Progress Note     CC: medication management for depression/anxiety    INTERVAL HISTORY:  Pt reports she has not been doing well. Feels her depression has been increasing again over the past 1 month. States she feels anxiety that has worsened over the past month. States the holidays are stressful for her. She had repeatedly asked her  to get the Chana tree out of attic and it took almost a month with him getting it out yesterday and she was so irritated by this she told him to put it up. She states she had been going to the gym but her daughter called her and said there had been car break ins and this caused pt significant anxiety and she hasn't been back to the gym. She also states she keeps going earlier to the gym sharing she would go at 6am but states then she'd go at 5am and now 4am due to less people.   She has a good appetite, tolerating diet \"I don't cook meals.\" Weight remains stable. She has been having increased feelings of guilt for no reason.   States she has drank coffee for 30 years and this month states she can't stand the taste of it. She finds this odd for her. She feels attacked when she is asked questions, such as going to get lab work they asked if she had paperwork and if she was there for surgery \"made me feel like I was lying or something.\" States she got so upset she just left. She hasn't gotten the labwork done that was ordered last appointment \"I'm just so sad.\" States she sleeps well without issue, prazosin continues to resolve her nightmares. No medical concerns. States her anxiety, by far, is the most concerning for her at this time.       Pt is tolerating meds, denies any SEs, doesn't feel they are efficacious, minimal improvements    Pt has tried and failed Zoloft, Effexor XR, Cymbalta currently on Prozac with minimal efficacy, Wellbutrin XL not working as well.     States Hussein Cruz LCSW was her psychotherapist and helpful but she has to much " fear/panic/anxiety about the appointments.     The following portions of the patient's history were reviewed and updated as appropriate: allergies, current medications, past family history, past medical history, past social history, past surgical history and problem list.      I have reviewed pt's allergies and current medications.   Current Outpatient Medications   Medication Sig Dispense Refill   • atorvastatin (LIPITOR) 20 MG tablet      • buPROPion XL (WELLBUTRIN XL) 150 MG 24 hr tablet Take 3 tablets by mouth Every Morning. 90 tablet 2   • fluticasone (FLONASE) 50 MCG/ACT nasal spray 2 sprays into the nostril(s) as directed by provider Daily.     • HYDROcodone-acetaminophen (NORCO) 7.5-325 MG per tablet      • levothyroxine (SYNTHROID, LEVOTHROID) 100 MCG tablet Daily.     • methylphenidate (RITALIN) 20 MG tablet Take 1.5 tabs po BID 90 tablet 0   • omeprazole (priLOSEC) 20 MG capsule      • prazosin (MINIPRESS) 2 MG capsule Take 3 capsules by mouth Every Night. 90 capsule 2   • QUEtiapine (SEROquel) 100 MG tablet Take 1-2 tablets by mouth At Night As Needed (sleep). 180 tablet 1   • vilazodone (VIIBRYD) 40 MG tablet tablet Take 1 tablet by mouth Daily. 90 tablet 0   • diazePAM (VALIUM) 2 MG tablet Take 1 tablet by mouth 2 (Two) Times a Day As Needed for Anxiety. 60 tablet 0     No current facility-administered medications for this visit.        Allergies   Allergen Reactions   • Codeine Hives and Itching     Review of Systems   Constitutional: Positive for fatigue. Negative for activity change and appetite change.   HENT: Negative.         Mouth pain r/t new dentures   Eyes: Negative.  Negative for visual disturbance.   Respiratory: Negative.    Cardiovascular: Negative.    Gastrointestinal: Negative.  Negative for nausea.   Endocrine: Negative.    Genitourinary: Negative.    Musculoskeletal: Positive for arthralgias, back pain and myalgias.   Skin: Negative.    Allergic/Immunologic: Negative.    Neurological:  "Negative.  Negative for dizziness, seizures and headaches.   Hematological: Negative.    Psychiatric/Behavioral: Positive for agitation and dysphoric mood. Negative for behavioral problems, confusion, decreased concentration, hallucinations, self-injury, sleep disturbance and suicidal ideas. The patient is nervous/anxious. The patient is not hyperactive.      /92 Comment: 95 bpm  Ht 167.6 cm (65.98\")   Wt 100 kg (221 lb 6.4 oz)   BMI 35.76 kg/m²     MENTAL STATUS EXAM:  Appearance:Neatly, casually dressed, good hygeine.   Cooperation:Cooperative  Orientation: Ox4  Gait and station stable.   Psychomotor: No psychomotor agitation/retardation, No EPS, No motor tics  Speech-normal rate, amount.  Mood \"I'm okay--just anxious--I need to go\"   Affect-constricted, anxious appearing  Thought Content-goal directed, no delusional material present  Thought process-linear, organized.  Suicidality: No SI  Homicidality: No HI  Perception: No AH/VH  Memory is intact for recent and remote events  Concentration: good  Impulse control-good  Insight- limited  Judgement-fair    Physical Exam   Constitutional: She is oriented to person, place, and time. She appears well-developed and well-nourished. No distress.   Neurological: She is alert and oriented to person, place, and time.   Skin: She is not diaphoretic.   Vitals reviewed.      ASSESSMENT AND PLAN  Encounter Diagnoses   Name Primary?   • Severe episode of recurrent major depressive disorder, with psychotic features (CMS/HCC) Yes   • Post traumatic stress disorder (PTSD)    • Panic disorder with agoraphobia    • Insomnia due to mental disorder    • Medication management    Functionality: pt having significant improvements in important areas of daily functioning.  Prognosis: Guarded dependent on medication/follow up and treatment plan compliance.  Body mass index is 35.76 kg/m².  Patient was educated on healthier and more balanced diet choices and encouraged exercise " within physical limitations.    1. Severe episode of recurrent major depressive disorder, with psychotic features (CMS/HCC)  - vilazodone (VIIBRYD) 40 MG tablet tablet; Take 1 tablet by mouth Daily.  Dispense: 90 tablet; Refill: 0  - buPROPion XL (WELLBUTRIN XL) 150 MG 24 hr tablet; Take 3 tablets by mouth Every Morning.  Dispense: 90 tablet; Refill: 2  - methylphenidate (RITALIN) 20 MG tablet; Take 1.5 tabs po BID  Dispense: 90 tablet; Refill: 0    2. Post traumatic stress disorder (PTSD)  - vilazodone (VIIBRYD) 40 MG tablet tablet; Take 1 tablet by mouth Daily.  Dispense: 90 tablet; Refill: 0  - prazosin (MINIPRESS) 2 MG capsule; Take 3 capsules by mouth Every Night.  Dispense: 90 capsule; Refill: 2  - diazePAM (VALIUM) 2 MG tablet; Take 1 tablet by mouth 2 (Two) Times a Day As Needed for Anxiety.  Dispense: 60 tablet; Refill: 0    3. Panic disorder with agoraphobia  - vilazodone (VIIBRYD) 40 MG tablet tablet; Take 1 tablet by mouth Daily.  Dispense: 90 tablet; Refill: 0  - diazePAM (VALIUM) 2 MG tablet; Take 1 tablet by mouth 2 (Two) Times a Day As Needed for Anxiety.  Dispense: 60 tablet; Refill: 0    4. Insomnia due to mental disorder  - prazosin (MINIPRESS) 2 MG capsule; Take 3 capsules by mouth Every Night.  Dispense: 90 capsule; Refill: 2    5. Medication management    Plan:  1. Continue and increase Viibryd to 40mg daily for worsened depression/anxiety  2. Continue Ritalin 20mg  3. Begin Valium 2mg bid prn for exacerbated anxiety  4. Continue Seroquel for sleep, Wellbutrin XL for mood, prazosin for nightmares  5. Once symptoms are decreased in severity pt agrees to resume psychotherapy with Hussein Cruz LCSW  6. Supportive psychotherapy    RTC 6 weeks or sooner if needed. Call with questions/concerns.     BHUMI RIZO REVIEWED      Thu Jackson, APRN

## 2019-01-15 DIAGNOSIS — F33.3 SEVERE EPISODE OF RECURRENT MAJOR DEPRESSIVE DISORDER, WITH PSYCHOTIC FEATURES (HCC): ICD-10-CM

## 2019-01-16 RX ORDER — METHYLPHENIDATE HYDROCHLORIDE 20 MG/1
TABLET ORAL
Qty: 90 TABLET | Refills: 0 | Status: SHIPPED | OUTPATIENT
Start: 2019-01-16 | End: 2019-02-14 | Stop reason: SDUPTHER

## 2019-02-14 ENCOUNTER — OFFICE VISIT (OUTPATIENT)
Dept: PSYCHIATRY | Facility: CLINIC | Age: 59
End: 2019-02-14

## 2019-02-14 ENCOUNTER — LAB (OUTPATIENT)
Dept: LAB | Facility: HOSPITAL | Age: 59
End: 2019-02-14

## 2019-02-14 VITALS
DIASTOLIC BLOOD PRESSURE: 93 MMHG | WEIGHT: 224 LBS | SYSTOLIC BLOOD PRESSURE: 151 MMHG | HEART RATE: 109 BPM | HEIGHT: 66 IN | BODY MASS INDEX: 36 KG/M2

## 2019-02-14 DIAGNOSIS — F33.3 SEVERE EPISODE OF RECURRENT MAJOR DEPRESSIVE DISORDER, WITH PSYCHOTIC FEATURES (HCC): Primary | ICD-10-CM

## 2019-02-14 DIAGNOSIS — Z79.899 MEDICATION MANAGEMENT: ICD-10-CM

## 2019-02-14 DIAGNOSIS — F51.05 INSOMNIA DUE TO MENTAL DISORDER: ICD-10-CM

## 2019-02-14 DIAGNOSIS — E66.01 MORBIDLY OBESE (HCC): ICD-10-CM

## 2019-02-14 DIAGNOSIS — R46.89 AVOIDANT BEHAVIOR: ICD-10-CM

## 2019-02-14 DIAGNOSIS — F43.10 POST TRAUMATIC STRESS DISORDER (PTSD): ICD-10-CM

## 2019-02-14 DIAGNOSIS — F40.01 PANIC DISORDER WITH AGORAPHOBIA: ICD-10-CM

## 2019-02-14 LAB
ALBUMIN SERPL-MCNC: 4.8 G/DL (ref 3.5–5)
ALP SERPL-CCNC: 94 U/L (ref 35–104)
ALT SERPL W P-5'-P-CCNC: 24 U/L (ref 10–36)
AMPHETAMINE CUT-OFF: ABNORMAL
ANION GAP SERPL CALCULATED.3IONS-SCNC: 6.6 MMOL/L (ref 3.6–11.2)
AST SERPL-CCNC: 26 U/L (ref 10–30)
BASOPHILS # BLD AUTO: 0.05 10*3/MM3 (ref 0–0.3)
BASOPHILS NFR BLD AUTO: 1 % (ref 0–2)
BENZODIAZIPINE CUT-OFF: ABNORMAL
BILIRUB CONJ SERPL-MCNC: 0.2 MG/DL (ref 0–0.2)
BILIRUB INDIRECT SERPL-MCNC: 0.5 MG/DL
BILIRUB SERPL-MCNC: 0.7 MG/DL (ref 0.2–1.8)
BUN BLD-MCNC: 9 MG/DL (ref 7–21)
BUN/CREAT SERPL: 9.6 (ref 7–25)
BUPRENORPHINE CUT-OFF: ABNORMAL
CALCIUM SPEC-SCNC: 9.5 MG/DL (ref 7.7–10)
CHLORIDE SERPL-SCNC: 104 MMOL/L (ref 99–112)
CHOLEST SERPL-MCNC: 227 MG/DL (ref 0–200)
CO2 SERPL-SCNC: 27.4 MMOL/L (ref 24.3–31.9)
COCAINE CUT-OFF: ABNORMAL
CREAT BLD-MCNC: 0.94 MG/DL (ref 0.43–1.29)
DEPRECATED RDW RBC AUTO: 40.7 FL (ref 37–54)
EOSINOPHIL # BLD AUTO: 0.16 10*3/MM3 (ref 0–0.7)
EOSINOPHIL NFR BLD AUTO: 3.1 % (ref 0–5)
ERYTHROCYTE [DISTWIDTH] IN BLOOD BY AUTOMATED COUNT: 12.6 % (ref 11.5–14.5)
EXTERNAL AMPHETAMINE SCREEN URINE: NEGATIVE
EXTERNAL BENZODIAZEPINE SCREEN URINE: POSITIVE
EXTERNAL BUPRENORPHINE SCREEN URINE: NEGATIVE
EXTERNAL COCAINE SCREEN URINE: NEGATIVE
EXTERNAL MDMA: NEGATIVE
EXTERNAL METHADONE SCREEN URINE: NEGATIVE
EXTERNAL METHAMPHETAMINE SCREEN URINE: NEGATIVE
EXTERNAL OPIATES SCREEN URINE: POSITIVE
EXTERNAL OXYCODONE SCREEN URINE: NEGATIVE
EXTERNAL THC SCREEN URINE: NEGATIVE
GFR SERPL CREATININE-BSD FRML MDRD: 61 ML/MIN/1.73
GLUCOSE BLD-MCNC: 107 MG/DL (ref 70–110)
HBA1C MFR BLD: 6 % (ref 4.5–5.7)
HCT VFR BLD AUTO: 44.8 % (ref 37–47)
HDLC SERPL-MCNC: 68 MG/DL (ref 60–100)
HGB BLD-MCNC: 14.5 G/DL (ref 12–16)
IMM GRANULOCYTES # BLD AUTO: 0.01 10*3/MM3 (ref 0–0.03)
IMM GRANULOCYTES NFR BLD AUTO: 0.2 % (ref 0–0.5)
LDLC SERPL CALC-MCNC: 118 MG/DL (ref 0–100)
LDLC/HDLC SERPL: 1.73 {RATIO}
LYMPHOCYTES # BLD AUTO: 1.44 10*3/MM3 (ref 1–3)
LYMPHOCYTES NFR BLD AUTO: 28 % (ref 21–51)
MCH RBC QN AUTO: 29 PG (ref 27–33)
MCHC RBC AUTO-ENTMCNC: 32.4 G/DL (ref 33–37)
MCV RBC AUTO: 89.6 FL (ref 80–94)
MDMA CUT-OFF: ABNORMAL
METHADONE CUT-OFF: ABNORMAL
METHAMPHETAMINE CUT-OFF: ABNORMAL
MONOCYTES # BLD AUTO: 0.34 10*3/MM3 (ref 0.1–0.9)
MONOCYTES NFR BLD AUTO: 6.6 % (ref 0–10)
NEUTROPHILS # BLD AUTO: 3.15 10*3/MM3 (ref 1.4–6.5)
NEUTROPHILS NFR BLD AUTO: 61.1 % (ref 30–70)
OPIATES CUT-OFF: ABNORMAL
OSMOLALITY SERPL CALC.SUM OF ELEC: 274.8 MOSM/KG (ref 273–305)
OXYCODONE CUT-OFF: ABNORMAL
PLATELET # BLD AUTO: 232 10*3/MM3 (ref 130–400)
PMV BLD AUTO: 10.6 FL (ref 6–10)
POTASSIUM BLD-SCNC: 4 MMOL/L (ref 3.5–5.3)
PROT SERPL-MCNC: 7.7 G/DL (ref 6–8)
RBC # BLD AUTO: 5 10*6/MM3 (ref 4.2–5.4)
SODIUM BLD-SCNC: 138 MMOL/L (ref 135–153)
T4 FREE SERPL-MCNC: 1.09 NG/DL (ref 0.89–1.76)
THC CUT-OFF: ABNORMAL
TRIGL SERPL-MCNC: 207 MG/DL (ref 0–150)
TSH SERPL DL<=0.05 MIU/L-ACNC: 5.15 MIU/ML (ref 0.55–4.78)
VIT B12 BLD-MCNC: 484 PG/ML (ref 211–911)
VLDLC SERPL-MCNC: 41.4 MG/DL
WBC NRBC COR # BLD: 5.15 10*3/MM3 (ref 4.5–12.5)

## 2019-02-14 PROCEDURE — 85025 COMPLETE CBC W/AUTO DIFF WBC: CPT

## 2019-02-14 PROCEDURE — 80061 LIPID PANEL: CPT

## 2019-02-14 PROCEDURE — 80048 BASIC METABOLIC PNL TOTAL CA: CPT

## 2019-02-14 PROCEDURE — 82652 VIT D 1 25-DIHYDROXY: CPT

## 2019-02-14 PROCEDURE — 36415 COLL VENOUS BLD VENIPUNCTURE: CPT

## 2019-02-14 PROCEDURE — 84443 ASSAY THYROID STIM HORMONE: CPT

## 2019-02-14 PROCEDURE — 83036 HEMOGLOBIN GLYCOSYLATED A1C: CPT

## 2019-02-14 PROCEDURE — 84439 ASSAY OF FREE THYROXINE: CPT

## 2019-02-14 PROCEDURE — 99214 OFFICE O/P EST MOD 30 MIN: CPT | Performed by: NURSE PRACTITIONER

## 2019-02-14 PROCEDURE — 82607 VITAMIN B-12: CPT

## 2019-02-14 PROCEDURE — 80076 HEPATIC FUNCTION PANEL: CPT

## 2019-02-14 RX ORDER — METHYLPHENIDATE HYDROCHLORIDE 20 MG/1
TABLET ORAL
Qty: 90 TABLET | Refills: 0 | Status: SHIPPED | OUTPATIENT
Start: 2019-02-14 | End: 2019-03-14 | Stop reason: SDUPTHER

## 2019-02-14 RX ORDER — ZIPRASIDONE HYDROCHLORIDE 20 MG/1
20 CAPSULE ORAL
Qty: 30 CAPSULE | Refills: 1 | Status: SHIPPED | OUTPATIENT
Start: 2019-02-14 | End: 2019-05-14 | Stop reason: SINTOL

## 2019-02-14 NOTE — PROGRESS NOTES
"Outpatient Psychiatric Progress Note     CC: medication management for depression/anxiety    INTERVAL HISTORY:  Pt was unable to go to the lab \"I just couldn't get myself to come down here.\" States she hadn't ate today and hoped to go to the lab today but now doesn't She is overwhelmed and tearful. States sitting out in the waiting room agitated her. Doesn't know if her day will improve \"it's just been rough.\" She continues to isolate and withdraw not wanting to leave her house. She is getting at least 5 hours of sleep a night. Reports she wakes up 3-4x nightly to look at the clock \"which is stupid.\" Prazosin continues to resolve hernightmares. She is only taking 20mg Viibryd as the 40mg dose caused increased agitation and states it made her dizzy. She is very seldom taking Valium \"I don't want to depend on it.\" Rates anxiety on average 7/10 with symptoms of staying hypervigilant, on edge, irritated at everyone \"it just stresses me out.\" She is very avoidant of leaving house \"I come here and my primary doctor and that's about it.\" She orders groceries online and  picks them up. She shares \"I just feel dead inside, no energy.\" She quit going to the gym as cars were getting broken in to and this scared her \"I'm not going back to any gym.\" States when she leaves the house she has paranoid thoughts that the police are following her. She has panic symptoms such as feeling of impending doom and heart racing when someone tries to call her on the phone. Denies any significant stressors/medical concerns. Appetite is good, tolerates diet.     Pt is tolerating meds, denies any SEs, doesn't feel they are efficacious, minimal improvements    Pt has tried and failed Zoloft, Effexor XR, Cymbalta currently on Prozac with minimal efficacy, Wellbutrin XL not working as well. Abilify was not efficacious/intolerable SE. Seroquel too sedating only takes for sleep.     States Hussein Cruz LCSW was her psychotherapist and helpful " but she has to much fear/panic/anxiety about the appointments.     The following portions of the patient's history were reviewed and updated as appropriate: allergies, current medications, past family history, past medical history, past social history, past surgical history and problem list.      I have reviewed pt's allergies and current medications.   Current Outpatient Medications   Medication Sig Dispense Refill   • atorvastatin (LIPITOR) 20 MG tablet      • buPROPion XL (WELLBUTRIN XL) 150 MG 24 hr tablet Take 3 tablets by mouth Every Morning. 90 tablet 2   • diazePAM (VALIUM) 2 MG tablet Take 1 tablet by mouth 2 (Two) Times a Day As Needed for Anxiety. 60 tablet 0   • fluticasone (FLONASE) 50 MCG/ACT nasal spray 2 sprays into the nostril(s) as directed by provider Daily.     • HYDROcodone-acetaminophen (NORCO) 7.5-325 MG per tablet      • levothyroxine (SYNTHROID, LEVOTHROID) 100 MCG tablet Daily.     • methylphenidate (RITALIN) 20 MG tablet Take 1.5 tabs po BID 90 tablet 0   • omeprazole (priLOSEC) 20 MG capsule      • prazosin (MINIPRESS) 2 MG capsule Take 3 capsules by mouth Every Night. 90 capsule 2   • QUEtiapine (SEROquel) 100 MG tablet Take 1-2 tablets by mouth At Night As Needed (sleep). 180 tablet 1   • vilazodone (VIIBRYD) 40 MG tablet tablet Take 1 tablet by mouth Daily. 90 tablet 0   • ziprasidone (GEODON) 20 MG capsule Take 1 capsule by mouth Daily With Breakfast. 30 capsule 1     No current facility-administered medications for this visit.        Allergies   Allergen Reactions   • Codeine Hives and Itching     Review of Systems   Constitutional: Positive for fatigue. Negative for activity change and appetite change.   HENT: Negative.         Mouth pain r/t new dentures   Eyes: Negative.  Negative for visual disturbance.   Respiratory: Negative.    Cardiovascular: Negative.    Gastrointestinal: Negative.  Negative for nausea.   Endocrine: Negative.    Genitourinary: Negative.    Musculoskeletal:  "Positive for arthralgias, back pain and myalgias.   Skin: Negative.    Allergic/Immunologic: Negative.    Neurological: Negative.  Negative for dizziness, seizures and headaches.   Hematological: Negative.    Psychiatric/Behavioral: Positive for agitation, decreased concentration and dysphoric mood. Negative for behavioral problems, confusion, hallucinations, self-injury, sleep disturbance and suicidal ideas. The patient is nervous/anxious. The patient is not hyperactive.      /93   Pulse 109   Ht 167.6 cm (65.98\")   Wt 102 kg (224 lb)   BMI 36.18 kg/m²     MENTAL STATUS EXAM:  Appearance:Neatly, casually dressed, good hygeine.   Cooperation:Cooperative  Orientation: Ox4  Gait and station stable.   Psychomotor: No psychomotor agitation/retardation, No EPS, No motor tics  Speech-normal rate, amount.  Mood \"I'm okay--just anxious--I need to go\"   Affect-constricted, anxious appearing  Thought Content-goal directed, no delusional material present  Thought process-linear, organized.  Suicidality: No SI  Homicidality: No HI  Perception: No AH/VH  Memory is intact for recent and remote events  Concentration: good  Impulse control-good  Insight- limited  Judgement-fair    Physical Exam   Constitutional: She is oriented to person, place, and time. She appears well-developed and well-nourished. No distress.   Neurological: She is alert and oriented to person, place, and time.   Skin: She is not diaphoretic.   Vitals reviewed.      ASSESSMENT AND PLAN  Encounter Diagnoses   Name Primary?   • Severe episode of recurrent major depressive disorder, with psychotic features (CMS/HCC) Yes   • Post traumatic stress disorder (PTSD)    • Panic disorder with agoraphobia    • Insomnia due to mental disorder    • Medication management    • Avoidant behavior    • Morbidly obese (CMS/HCC)    Functionality: pt having significant improvements in important areas of daily functioning.  Prognosis: Guarded dependent on " medication/follow up and treatment plan compliance.  Body mass index is 36.18 kg/m².  Patient was educated on healthier and more balanced diet choices and encouraged exercise within physical limitations.    1. Severe episode of recurrent major depressive disorder, with psychotic features (CMS/HCC)    - methylphenidate (RITALIN) 20 MG tablet; Take 1.5 tabs po BID  Dispense: 90 tablet; Refill: 0  - ziprasidone (GEODON) 20 MG capsule; Take 1 capsule by mouth Daily With Breakfast.  Dispense: 30 capsule; Refill: 1    2. Post traumatic stress disorder (PTSD)    3. Panic disorder with agoraphobia    4. Insomnia due to mental disorder    5. medication management    - [facility] Drug Screen  - CBC & Differential; Future  - Basic Metabolic Panel; Future  - TSH; Future  - T4, Free; Future  - Vitamin B12; Future  - Lipid Panel; Future  - Hemoglobin A1c; Future  - Vitamin D 1,25 Dihydroxy; Future  - Hepatic Function Panel; Future    6. Avoidant behavior    7. Morbidly obese (CMS/HCC)          Plan:  1. Continue Viibryd to 40mg daily for worsened depression/anxiety  2. Continue Ritalin 20mg  3.  Valium 2mg bid prn for exacerbated anxiety  4. Continue Seroquel for sleep, Wellbutrin XL for mood, prazosin for nightmares  5. Begin Geodon 20mg for psychosis  6.Once symptoms are decreased in severity pt agrees to resume psychotherapy with Hussein Cruz LCSW  7. Supportive psychotherapy    RTC 4-6 weeks or sooner if needed. Call with questions/concerns.     BHUMI RIZO REVIEWED      Thu Jackson, PO

## 2019-02-18 LAB — 1,25(OH)2D3 SERPL-MCNC: 25.5 PG/ML (ref 19.9–79.3)

## 2019-03-14 DIAGNOSIS — F33.3 SEVERE EPISODE OF RECURRENT MAJOR DEPRESSIVE DISORDER, WITH PSYCHOTIC FEATURES (HCC): ICD-10-CM

## 2019-03-17 RX ORDER — METHYLPHENIDATE HYDROCHLORIDE 20 MG/1
TABLET ORAL
Qty: 90 TABLET | Refills: 0 | Status: SHIPPED | OUTPATIENT
Start: 2019-03-17 | End: 2019-04-15 | Stop reason: SDUPTHER

## 2019-04-03 ENCOUNTER — OFFICE VISIT (OUTPATIENT)
Dept: PSYCHIATRY | Facility: CLINIC | Age: 59
End: 2019-04-03

## 2019-04-03 DIAGNOSIS — R46.89 AVOIDANT BEHAVIOR: ICD-10-CM

## 2019-04-03 DIAGNOSIS — F40.01 PANIC DISORDER WITH AGORAPHOBIA: ICD-10-CM

## 2019-04-03 DIAGNOSIS — F51.05 INSOMNIA DUE TO MENTAL CONDITION: ICD-10-CM

## 2019-04-03 DIAGNOSIS — F33.3 SEVERE EPISODE OF RECURRENT MAJOR DEPRESSIVE DISORDER, WITH PSYCHOTIC FEATURES (HCC): Primary | ICD-10-CM

## 2019-04-03 PROCEDURE — 90791 PSYCH DIAGNOSTIC EVALUATION: CPT | Performed by: SOCIAL WORKER

## 2019-04-03 NOTE — PROGRESS NOTES
"Date of Service: April 3, 2019  Time In: 8:00 AM  Time Out: 8:45 AM    Update:    IDENTIFYING INFORMATION:   Delphine Mcmillan  is a 58 y.o. female who is here today for initial appointment after not being in treatment for more than 1 year.     CHIEF COMPLIANT: \"Depression\"    HPI: Patient reports she feels her symptoms have moderately increased since last session with the undersigned and states she has also been babysitting her 2-year-old granddaughter 5 days a week which she feels is also exacerbated her symptoms.  The patient continues to struggle with depression as evidenced by depressed mood, anhedonia, anergia, feeling hopeless, a sense of self-loathing, low self-esteem, decreased motivation, and ongoing severe social isolation.  Patient also struggles with panic disorder as evidenced by feeling on edge, feeling overwhelmed, increased heart rate, shortness of breath, numbness and tingling, muscle tension, mind goes blank, and a sense of impending doom.  Patient continues to struggle in social situations and exhibits considerable avoidance behavior.  Patient also appears to be struggling with feelings of guilt which is precluding her from being able to discussed the fact she does not feel she is capable of caring for her granddaughter every day the with her daughter.      PAST PSYCHIATRIC HISTORY: Patient has been in treatment at the [facility] for approximately 4 years and continues in pharmacotherapy with PO Sawant.  The patient was in psychotherapy with the undersigned for approximately 2-2-1/2 years.  Patient denies history of psychiatric hospitalization.      SUBSTANCE ABUSE HISTORY: None reported      MEDICAL HISTORY: See medical record      CURRENT MEDICATIONS:  Current Outpatient Medications   Medication Sig Dispense Refill   • atorvastatin (LIPITOR) 20 MG tablet      • buPROPion XL (WELLBUTRIN XL) 150 MG 24 hr tablet Take 3 tablets by mouth Every Morning. 90 tablet 2   • diazePAM (VALIUM) 2 " MG tablet Take 1 tablet by mouth 2 (Two) Times a Day As Needed for Anxiety. 60 tablet 0   • fluticasone (FLONASE) 50 MCG/ACT nasal spray 2 sprays into the nostril(s) as directed by provider Daily.     • HYDROcodone-acetaminophen (NORCO) 7.5-325 MG per tablet      • levothyroxine (SYNTHROID, LEVOTHROID) 100 MCG tablet Daily.     • methylphenidate (RITALIN) 20 MG tablet Take 1.5 tabs po BID 90 tablet 0   • omeprazole (priLOSEC) 20 MG capsule      • prazosin (MINIPRESS) 2 MG capsule Take 3 capsules by mouth Every Night. 90 capsule 2   • QUEtiapine (SEROquel) 100 MG tablet Take 1-2 tablets by mouth At Night As Needed (sleep). 180 tablet 1   • vilazodone (VIIBRYD) 40 MG tablet tablet Take 1 tablet by mouth Daily. 90 tablet 0   • ziprasidone (GEODON) 20 MG capsule Take 1 capsule by mouth Daily With Breakfast. 30 capsule 1     No current facility-administered medications for this visit.          FAMILY HISTORY: Patient reports she feels family dynamics have gotten more difficult since last session with the undersigned including the fact she feels she is being required to care for her grandchild 5 days a week.  She also reports she and her  continue to struggle with their relationship and states she feels as though he simply does what ever he wishes.  Patient continues to discuss significant positive family history of mental illness on her mother's side of the family and states she also has a sister who has significant chronic mental illness.  Patient denies any history of substance use.      SOCIAL HISTORY: Patient continues to live in the home with her  of approximately 38 years and they are her own home.  Patient reports that they get along well but states she feels as though they are not as close.  The patient also continues to be in a great deal of her time interacting with her son and daughter and is currently babysitting HER-2-year-old granddaughter 5 days weekly.  Patient continues to spend the vast  majority of her time at home and reports she is not engaging in any activities outside the home.  Patient denies any legal issues.    Assessment/Plan   Diagnoses and all orders for this visit:    Severe episode of recurrent major depressive disorder, with psychotic features (CMS/HCC)    Panic disorder with agoraphobia    Avoidant behavior    Insomnia due to mental condition      Return in about 4 weeks (around 5/1/2019) for Next scheduled follow up.        MENTAL STATUS EXAM:   Hygiene:   good  Cooperation:  Cooperative  Eye Contact:  Fair  Psychomotor Behavior:  Appropriate  Affect:  Appropriate  Hopelessness: 2  Speech:  Monotone  Thought Process:  Linear  Thought Content:  Normal  Suicidal:  None  Homicidal:  None  Hallucinations:  None  Delusion:  None  Memory:  Intact  Orientation:  Person, Place, Time and Situation  Reliability:  fair  Insight:  Fair  Judgement:  Fair  Impulse Control:  Fair  Physical/Medical Issues:  No     PROBLEM LIST:   Depression  Panic disorder    STRENGTHS:   Positive therapeutic relationship, willingness to seek treatment, stable housing, sense of responsibility to family    WEAKNESSES:  Chronic mental illness, significant biological risk, severe social isolation, poor coping skills, limited support      SHORT-TERM GOALS: Patient will be compliant with clinic appointments.  Patient will be engaged in therapy, medication compliant with minimal side effects. Patient  will report decrease of symptoms and frequency.  Patient will maintain stability and avoid higher level of care.    LONG-TERM GOALS: Patient will have cessation of symptoms and be able to function at optimal levels without continued treatment.     PLAN:   Patient will continue in individual outpatient therapy session at [facility] in approximately 4 weeks and will continue in pharmacotherapy with PO Greer and follow all recommendations including adhering to medication regimen as prescribed.    The patient was  instructed to contact the clinic, call 911, or present to the nearest emergency room if crisis occurs.       Hussein Cruz LCSW, FRANK     This document signed by Hussein Cruz LCSW, FRANK April 3, 2019 10:16 AM

## 2019-04-15 DIAGNOSIS — F33.3 SEVERE EPISODE OF RECURRENT MAJOR DEPRESSIVE DISORDER, WITH PSYCHOTIC FEATURES (HCC): ICD-10-CM

## 2019-04-16 RX ORDER — METHYLPHENIDATE HYDROCHLORIDE 20 MG/1
TABLET ORAL
Qty: 90 TABLET | Refills: 0 | Status: SHIPPED | OUTPATIENT
Start: 2019-04-16 | End: 2019-05-17 | Stop reason: SDUPTHER

## 2019-05-14 ENCOUNTER — OFFICE VISIT (OUTPATIENT)
Dept: PSYCHIATRY | Facility: CLINIC | Age: 59
End: 2019-05-14

## 2019-05-14 VITALS
DIASTOLIC BLOOD PRESSURE: 90 MMHG | HEART RATE: 114 BPM | SYSTOLIC BLOOD PRESSURE: 150 MMHG | WEIGHT: 244 LBS | BODY MASS INDEX: 39.21 KG/M2 | HEIGHT: 66 IN

## 2019-05-14 DIAGNOSIS — F40.01 PANIC DISORDER WITH AGORAPHOBIA: Primary | ICD-10-CM

## 2019-05-14 DIAGNOSIS — F33.3 SEVERE EPISODE OF RECURRENT MAJOR DEPRESSIVE DISORDER, WITH PSYCHOTIC FEATURES (HCC): ICD-10-CM

## 2019-05-14 DIAGNOSIS — F43.10 POST TRAUMATIC STRESS DISORDER (PTSD): ICD-10-CM

## 2019-05-14 DIAGNOSIS — E66.9 OBESITY (BMI 30-39.9): ICD-10-CM

## 2019-05-14 DIAGNOSIS — F51.05 INSOMNIA DUE TO MENTAL DISORDER: ICD-10-CM

## 2019-05-14 DIAGNOSIS — R46.89 AVOIDANT BEHAVIOR: ICD-10-CM

## 2019-05-14 DIAGNOSIS — Z79.899 MEDICATION MANAGEMENT: ICD-10-CM

## 2019-05-14 LAB
AMPHETAMINE CUT-OFF: ABNORMAL
BENZODIAZIPINE CUT-OFF: ABNORMAL
BUPRENORPHINE CUT-OFF: ABNORMAL
COCAINE CUT-OFF: ABNORMAL
EXTERNAL AMPHETAMINE SCREEN URINE: NEGATIVE
EXTERNAL BENZODIAZEPINE SCREEN URINE: POSITIVE
EXTERNAL BUPRENORPHINE SCREEN URINE: NEGATIVE
EXTERNAL COCAINE SCREEN URINE: NEGATIVE
EXTERNAL MDMA: NEGATIVE
EXTERNAL METHADONE SCREEN URINE: NEGATIVE
EXTERNAL METHAMPHETAMINE SCREEN URINE: NEGATIVE
EXTERNAL OPIATES SCREEN URINE: POSITIVE
EXTERNAL OXYCODONE SCREEN URINE: NEGATIVE
EXTERNAL THC SCREEN URINE: NEGATIVE
MDMA CUT-OFF: ABNORMAL
METHADONE CUT-OFF: ABNORMAL
METHAMPHETAMINE CUT-OFF: ABNORMAL
OPIATES CUT-OFF: ABNORMAL
OXYCODONE CUT-OFF: ABNORMAL
THC CUT-OFF: ABNORMAL

## 2019-05-14 PROCEDURE — 99214 OFFICE O/P EST MOD 30 MIN: CPT | Performed by: NURSE PRACTITIONER

## 2019-05-14 PROCEDURE — 90836 PSYTX W PT W E/M 45 MIN: CPT | Performed by: NURSE PRACTITIONER

## 2019-05-14 RX ORDER — TOPIRAMATE 25 MG/1
25 TABLET ORAL 2 TIMES DAILY
Qty: 60 TABLET | Refills: 1 | Status: SHIPPED | OUTPATIENT
Start: 2019-05-14 | End: 2019-07-09

## 2019-05-14 NOTE — PROGRESS NOTES
Outpatient Psychiatric Progress Note     CC: medication management for depression/anxiety    INTERVAL HISTORY:  Pt was able to go get her bloodwork and her thyroid level was mildly elevated. She states she has been keeping herself busy watching her 2 yr old grandaughter during the work week. She feels this is exacerbating her anxiety/depressive. She hasn't discussed this with her daughter for it makes pt feel guilty saying no.  The patient continues to struggle with depression as evidenced by depressed mood, anhedonia, anergia, feeling hopeless, a sense of self-loathing, low self-esteem, decreased motivation, and ongoing severe social isolation.  Patient also struggles with panic disorder as evidenced by feeling on edge, feeling overwhelmed, increased heart rate, shortness of breath, numbness and tingling, muscle tension, mind goes blank, and a sense of impending doom.  Patient continues to struggle in social situations and exhibits considerable avoidance behavior symptoms.   Pt has 20 lb weight gain since last visit 3 months ago. She reports she was going to the gym prior to last visit and hasn't had the motivation to do this. Plans to start walking and realizes she can't sit on the couch all day. Her weight has caused increased depression and low self-esteem.  UDS reviewed, preliminary positive for benzo in compliance with tx plan.   Denies any significant stressors/medical concerns. Appetite is good, tolerates diet.   Pt is tolerating meds, intolerable SE of Geodon and has quit taking, doesn't feel they are efficacious, minimal improvements    Pt has tried and failed Zoloft, Effexor XR, Cymbalta currently on Prozac with minimal efficacy, Wellbutrin XL not working as well. Abilify was not efficacious/intolerable SE. Seroquel too sedating only takes for sleep.     States Hussein Cruz LCSW was her psychotherapist and helpful but she has to much fear/panic/anxiety about the appointments.     The following portions of  the patient's history were reviewed and updated as appropriate: allergies, current medications, past family history, past medical history, past social history, past surgical history and problem list.      I have reviewed pt's allergies and current medications.   Current Outpatient Medications   Medication Sig Dispense Refill   • atorvastatin (LIPITOR) 20 MG tablet      • buPROPion XL (WELLBUTRIN XL) 150 MG 24 hr tablet Take 3 tablets by mouth Every Morning. 90 tablet 2   • diazePAM (VALIUM) 2 MG tablet Take 1 tablet by mouth 2 (Two) Times a Day As Needed for Anxiety. 60 tablet 0   • fluticasone (FLONASE) 50 MCG/ACT nasal spray 2 sprays into the nostril(s) as directed by provider Daily.     • HYDROcodone-acetaminophen (NORCO) 7.5-325 MG per tablet      • levothyroxine (SYNTHROID, LEVOTHROID) 100 MCG tablet Daily.     • omeprazole (priLOSEC) 20 MG capsule      • prazosin (MINIPRESS) 2 MG capsule Take 3 capsules by mouth Every Night. 90 capsule 2   • QUEtiapine (SEROquel) 100 MG tablet Take 1-2 tablets by mouth At Night As Needed (sleep). 180 tablet 1   • vilazodone (VIIBRYD) 40 MG tablet tablet Take 1 tablet by mouth Daily. 90 tablet 0   • methylphenidate (RITALIN) 20 MG tablet TAKE 1.5 TABLETS BY MOUTHTWICE A DAY 90 tablet 0   • topiramate (TOPAMAX) 25 MG tablet Take 1 tablet by mouth 2 (Two) Times a Day. 60 tablet 1   • VIIBRYD 20 MG tablet tablet TAKE 1 TABLET BY MOUTH EVERY DAY 90 tablet 0     No current facility-administered medications for this visit.        Allergies   Allergen Reactions   • Codeine Hives and Itching     Review of Systems   Constitutional: Positive for fatigue. Negative for activity change and appetite change.   HENT: Negative.    Eyes: Negative.  Negative for visual disturbance.   Respiratory: Negative.    Cardiovascular: Negative.    Gastrointestinal: Negative.  Negative for nausea.   Endocrine: Negative.    Genitourinary: Negative.    Musculoskeletal: Positive for arthralgias, back pain and  "myalgias.   Skin: Negative.    Allergic/Immunologic: Negative.    Neurological: Negative.  Negative for dizziness, seizures and headaches.   Hematological: Negative.    Psychiatric/Behavioral: Positive for agitation, decreased concentration and dysphoric mood. Negative for behavioral problems, confusion, hallucinations, self-injury, sleep disturbance and suicidal ideas. The patient is nervous/anxious. The patient is not hyperactive.      /90   Pulse 114   Ht 167.6 cm (65.98\")   Wt 111 kg (244 lb)   BMI 39.41 kg/m²     MENTAL STATUS EXAM:  Appearance:Neatly, casually dressed, good hygeine.   Cooperation:Cooperative  Orientation: Ox4  Gait and station stable.   Psychomotor: No psychomotor agitation/retardation, No EPS, No motor tics  Speech-normal rate, amount.  Mood - anxious/agitated   Affect-constricted, anxious appearing  Thought Content-goal directed, no delusional material present  Thought process-linear, organized.  Suicidality: No SI  Homicidality: No HI  Perception: No AH/VH  Memory is intact for recent and remote events  Concentration: good  Impulse control-good  Insight- limited  Judgement-fair    Physical Exam   Constitutional: She is oriented to person, place, and time. She appears well-developed and well-nourished. No distress.   Neurological: She is alert and oriented to person, place, and time.   Skin: She is not diaphoretic.   Vitals reviewed.      ASSESSMENT AND PLAN  Encounter Diagnoses   Name Primary?   • Panic disorder with agoraphobia Yes   • Severe episode of recurrent major depressive disorder, with psychotic features (CMS/HCC)    • Post traumatic stress disorder (PTSD)    • Avoidant behavior    • Obesity (BMI 30-39.9)    • Medication management    • Insomnia due to mental disorder    Functionality: pt having worsening symptoms causing impairments in important areas of daily functioning.  Prognosis: Guarded dependent on medication/follow up and treatment plan compliance.  Body mass " index is 39.41 kg/m².  Patient was educated on healthier and more balanced diet choices and encouraged exercise within physical limitations.   Diagnosis Plan   1. Panic disorder with agoraphobia  diazePAM (VALIUM) 2 MG tablet   2. Severe episode of recurrent major depressive disorder, with psychotic features (CMS/HCC)  topiramate (TOPAMAX) 25 MG tablet    buPROPion XL (WELLBUTRIN XL) 150 MG 24 hr tablet   3. Post traumatic stress disorder (PTSD)  prazosin (MINIPRESS) 2 MG capsule    diazePAM (VALIUM) 2 MG tablet   4. Avoidant behavior     5. Obesity (BMI 30-39.9)  topiramate (TOPAMAX) 25 MG tablet   6. Medication management  [facility] Screen   7. Insomnia due to mental disorder  prazosin (MINIPRESS) 2 MG capsule         Plan:  1. Decrease Viibryd to 20mg daily for  Depression/anxiety. Begin topamax to assist in weight loss, and to also help with pt's mood.   2. Continue Ritalin 20mg   3.  Valium 2mg bid prn for exacerbated anxiety  4. Continue Seroquel for sleep, Wellbutrin XL for mood, prazosin for nightmares  5.DC  Geodon 20mg due to SE intolerability  6.Once symptoms are decreased in severity pt agrees to resume psychotherapy with Hussein Cruz LCSW  7. Supportive psychotherapy  Pt will get GeneSight testing to see if more appropriate meds may be considered  RTC 4-6 weeks or sooner if needed. Call with questions/concerns.   She will f/u with PCP regarding elevated TSH, elevated lipids, HgA1c      34017 UDS REVIEWED, preliminarily positive for benzo which is in compliance with tx. Pt also on Ritalin, will f/u with the confirmation results.  In 11:50am- Out 12:45 pm of which 40 min spent for supportive psychotherapy. Assisted patient in processing pt's anxiety, lack of assertiveness skills, depression. Acknowledged and normalized patient’s thoughts, feelings, and concerns. Utilized cognitive behavioral therapy to assist the patient in recognizing more appropriate coping mechanisms when she becomes agitated/anxious  which are proven effective in reducing the severity of frequency of symptoms. Role played assertiveness in being able to calmly talk to her daughter who already is aware of pt's mental health issues. Rationalized pt's distorted thought process and practiced how to talk self down.  Strongly urged her to continue to eat more well balanced and healthier foods in reducing further health risks. There was unconditional positive regard toward patient. Allowed patient to freely discuss issues without interruption or judgment.    Thu Jackson, APRN

## 2019-05-17 DIAGNOSIS — F33.3 SEVERE EPISODE OF RECURRENT MAJOR DEPRESSIVE DISORDER, WITH PSYCHOTIC FEATURES (HCC): ICD-10-CM

## 2019-05-17 RX ORDER — PRAZOSIN HYDROCHLORIDE 2 MG/1
6 CAPSULE ORAL NIGHTLY
Qty: 90 CAPSULE | Refills: 2 | Status: SHIPPED | OUTPATIENT
Start: 2019-05-17 | End: 2019-08-21 | Stop reason: SDUPTHER

## 2019-05-17 RX ORDER — BUPROPION HYDROCHLORIDE 150 MG/1
450 TABLET ORAL EVERY MORNING
Qty: 90 TABLET | Refills: 2 | Status: SHIPPED | OUTPATIENT
Start: 2019-05-17 | End: 2019-07-09 | Stop reason: SDUPTHER

## 2019-05-17 RX ORDER — METHYLPHENIDATE HYDROCHLORIDE 20 MG/1
TABLET ORAL
Qty: 90 TABLET | Refills: 0 | Status: SHIPPED | OUTPATIENT
Start: 2019-05-17 | End: 2019-06-12 | Stop reason: SDUPTHER

## 2019-05-17 RX ORDER — DIAZEPAM 2 MG/1
2 TABLET ORAL 2 TIMES DAILY PRN
Qty: 60 TABLET | Refills: 0 | Status: SHIPPED | OUTPATIENT
Start: 2019-05-17 | End: 2019-06-13 | Stop reason: SDUPTHER

## 2019-05-17 RX ORDER — VILAZODONE HYDROCHLORIDE 20 MG/1
TABLET ORAL
Qty: 90 TABLET | Refills: 0 | Status: SHIPPED | OUTPATIENT
Start: 2019-05-17 | End: 2019-08-15 | Stop reason: SDUPTHER

## 2019-05-20 ENCOUNTER — TELEPHONE (OUTPATIENT)
Dept: PSYCHIATRY | Facility: CLINIC | Age: 59
End: 2019-05-20

## 2019-05-22 NOTE — TELEPHONE ENCOUNTER
Spoke with patient she states she went ahead and paid out of pocket for medication as it was only $14.

## 2019-06-12 ENCOUNTER — OFFICE VISIT (OUTPATIENT)
Dept: PSYCHIATRY | Facility: CLINIC | Age: 59
End: 2019-06-12

## 2019-06-12 DIAGNOSIS — F33.3 SEVERE EPISODE OF RECURRENT MAJOR DEPRESSIVE DISORDER, WITH PSYCHOTIC FEATURES (HCC): ICD-10-CM

## 2019-06-12 DIAGNOSIS — Z63.0 MARITAL/PARTNER RELATIONAL PROBLEM: ICD-10-CM

## 2019-06-12 DIAGNOSIS — F40.01 PANIC DISORDER WITH AGORAPHOBIA: Primary | ICD-10-CM

## 2019-06-12 DIAGNOSIS — F43.10 POST TRAUMATIC STRESS DISORDER (PTSD): ICD-10-CM

## 2019-06-12 DIAGNOSIS — F51.05 INSOMNIA DUE TO MENTAL DISORDER: ICD-10-CM

## 2019-06-12 PROCEDURE — 90837 PSYTX W PT 60 MINUTES: CPT | Performed by: SOCIAL WORKER

## 2019-06-12 RX ORDER — METHYLPHENIDATE HYDROCHLORIDE 20 MG/1
TABLET ORAL
Qty: 90 TABLET | Refills: 0 | Status: SHIPPED | OUTPATIENT
Start: 2019-06-12 | End: 2019-07-09 | Stop reason: SDUPTHER

## 2019-06-12 SDOH — SOCIAL STABILITY - SOCIAL INSECURITY: PROBLEMS IN RELATIONSHIP WITH SPOUSE OR PARTNER: Z63.0
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
## 2019-06-13 DIAGNOSIS — F43.10 POST TRAUMATIC STRESS DISORDER (PTSD): ICD-10-CM

## 2019-06-13 DIAGNOSIS — F40.01 PANIC DISORDER WITH AGORAPHOBIA: ICD-10-CM

## 2019-06-13 RX ORDER — DIAZEPAM 2 MG/1
2 TABLET ORAL 2 TIMES DAILY PRN
Qty: 60 TABLET | Refills: 0 | Status: SHIPPED | OUTPATIENT
Start: 2019-06-13 | End: 2019-09-10 | Stop reason: SDUPTHER

## 2019-07-09 ENCOUNTER — OFFICE VISIT (OUTPATIENT)
Dept: PSYCHIATRY | Facility: CLINIC | Age: 59
End: 2019-07-09

## 2019-07-09 VITALS
HEIGHT: 66 IN | WEIGHT: 243.6 LBS | BODY MASS INDEX: 39.15 KG/M2 | DIASTOLIC BLOOD PRESSURE: 86 MMHG | SYSTOLIC BLOOD PRESSURE: 147 MMHG | HEART RATE: 105 BPM

## 2019-07-09 DIAGNOSIS — F40.01 PANIC DISORDER WITH AGORAPHOBIA: ICD-10-CM

## 2019-07-09 DIAGNOSIS — E66.9 OBESITY (BMI 30-39.9): ICD-10-CM

## 2019-07-09 DIAGNOSIS — F43.10 POST TRAUMATIC STRESS DISORDER (PTSD): ICD-10-CM

## 2019-07-09 DIAGNOSIS — F51.05 INSOMNIA DUE TO MENTAL DISORDER: ICD-10-CM

## 2019-07-09 DIAGNOSIS — G47.9 SLEEPING DIFFICULTY: ICD-10-CM

## 2019-07-09 DIAGNOSIS — F34.1 PERSISTENT DEPRESSIVE DISORDER WITH ANXIOUS DISTRESS, CURRENTLY MODERATE: Primary | ICD-10-CM

## 2019-07-09 DIAGNOSIS — R46.89 AVOIDANT BEHAVIOR: ICD-10-CM

## 2019-07-09 DIAGNOSIS — Z79.899 MEDICATION MANAGEMENT: ICD-10-CM

## 2019-07-09 DIAGNOSIS — F33.3 SEVERE EPISODE OF RECURRENT MAJOR DEPRESSIVE DISORDER, WITH PSYCHOTIC FEATURES (HCC): ICD-10-CM

## 2019-07-09 PROCEDURE — 90792 PSYCH DIAG EVAL W/MED SRVCS: CPT | Performed by: NURSE PRACTITIONER

## 2019-07-09 RX ORDER — METHYLPHENIDATE HYDROCHLORIDE 20 MG/1
TABLET ORAL
Qty: 90 TABLET | Refills: 0 | Status: SHIPPED | OUTPATIENT
Start: 2019-07-09 | End: 2019-08-12 | Stop reason: SDUPTHER

## 2019-07-09 RX ORDER — BUPROPION HYDROCHLORIDE 300 MG/1
300 TABLET ORAL EVERY MORNING
Qty: 30 TABLET | Refills: 2 | Status: SHIPPED | OUTPATIENT
Start: 2019-07-09 | End: 2019-09-03 | Stop reason: SDUPTHER

## 2019-07-09 RX ORDER — QUETIAPINE FUMARATE 200 MG/1
200 TABLET, FILM COATED ORAL NIGHTLY
Qty: 30 TABLET | Refills: 2 | Status: SHIPPED | OUTPATIENT
Start: 2019-07-09 | End: 2019-09-03 | Stop reason: SDUPTHER

## 2019-07-09 RX ORDER — TOPIRAMATE 25 MG/1
25 TABLET ORAL 2 TIMES DAILY
Qty: 60 TABLET | Refills: 1 | OUTPATIENT
Start: 2019-07-09 | End: 2020-07-08

## 2019-08-12 DIAGNOSIS — F34.1 PERSISTENT DEPRESSIVE DISORDER WITH ANXIOUS DISTRESS, CURRENTLY MODERATE: ICD-10-CM

## 2019-08-12 RX ORDER — METHYLPHENIDATE HYDROCHLORIDE 20 MG/1
TABLET ORAL
Qty: 90 TABLET | Refills: 0 | Status: SHIPPED | OUTPATIENT
Start: 2019-08-12 | End: 2019-09-03 | Stop reason: SDUPTHER

## 2019-08-14 ENCOUNTER — OFFICE VISIT (OUTPATIENT)
Dept: PSYCHIATRY | Facility: CLINIC | Age: 59
End: 2019-08-14

## 2019-08-14 DIAGNOSIS — F43.10 POST TRAUMATIC STRESS DISORDER (PTSD): ICD-10-CM

## 2019-08-14 DIAGNOSIS — F51.05 INSOMNIA DUE TO MENTAL CONDITION: ICD-10-CM

## 2019-08-14 DIAGNOSIS — R46.89 AVOIDANT BEHAVIOR: ICD-10-CM

## 2019-08-14 DIAGNOSIS — F40.01 PANIC DISORDER WITH AGORAPHOBIA: Primary | ICD-10-CM

## 2019-08-14 PROCEDURE — 90834 PSYTX W PT 45 MINUTES: CPT | Performed by: SOCIAL WORKER

## 2019-08-14 NOTE — TREATMENT PLAN
Multi-Disciplinary Problems (from Behavioral Health Treatment Plan)    Active Problems     Problem: Anxiety  Start Date: 08/14/19    Problem Details:  The patient self-scales this problem as a 7 with 10 being the worst.       Goal Priority Start Date Expected End Date End Date    Patient will develop and implement behavioral and cognitive strategies to reduce anxiety and irrational fears. High 08/14/19 08/14/20 --    Goal Details:  Progress toward goal:  The patient self-scales their progress related to this goal as a 6 with 10 being the worst.       Goal Intervention Frequency Start Date End Date    Help patient explore past emotional issues in relation to present anxiety. Q Month 08/14/19 08/14/20    Intervention Details:  Duration of treatment until remission of symptoms.       Goal Intervention Frequency Start Date End Date    Help patient develop an awareness of their cognitive and physical responses to anxiety. Q Month 08/14/19 08/14/20    Intervention Details:  Duration of treatment until remission of symptoms.             Problem: Depression  Start Date: 08/14/19    Problem Details:  The patient self-scales this problem as a 5 with 10 being the worst.       Goal Priority Start Date Expected End Date End Date    Patient will demonstrate the ability to initiate new constructive life skills outside of sessions on a consistent basis. High 08/14/19 08/14/20 --    Goal Details:  Progress toward goal:  The patient self-scales their progress related to this goal as a 5 with 10 being the worst.       Goal Intervention Frequency Start Date End Date    Assist patient in setting attainable activities of daily living goals. Q3 Weeks 08/14/19 08/14/20    Intervention Details:  Patient will focus on healthy skills of daily living and will actively seek activities she can engage in outside the home to reduce vital time and social isolation.     Goal Intervention Frequency Start Date End Date    Provide education about  depression Q Month 08/14/19 08/14/20    Intervention Details:  Duration of treatment until remission of symptoms.       Goal Intervention Frequency Start Date End Date    Assist patient in developing healthy coping strategies. Q Month 08/14/19 08/14/20    Intervention Details:  Duration of treatment until remission of symptoms.             Problem: Post Traumatic Stress  Start Date: 08/14/19    Problem Details:  The patient self-scales this problem as a 8 with 10 being the worst.       Goal Priority Start Date Expected End Date End Date    Patient will process and move through trauma in a way that improves self regard and the patients ability to function optimally in the world around them. -- 08/14/19 08/14/20 --    Goal Details:  Progress toward goal:  The patient self-scales their progress related to this goal as a 6 with 10 being the worst.       Goal Intervention Frequency Start Date End Date    Assist patient in identifying ways that trauma has negatively impacted their view of themselves and the world. Weekly 08/14/19 08/14/20    Intervention Details:  Duration of treatment until remission of symptoms.       Goal Intervention Frequency Start Date End Date    Process trauma in the context of the safe session environment. Q Month 08/14/19 08/14/20    Intervention Details:  Duration of treatment until remission of symptoms.       Goal Intervention Frequency Start Date End Date    Develop a plan of behavior changes that will reduce the stress of the trauma. Q Month 08/14/19 08/14/20    Intervention Details:  Duration of treatment until remission of symptoms.                          I have discussed and reviewed this treatment plan with the patient.  It has been printed for signatures.  This document signed by Hussein Cruz LCSW, [facility] August 14, 2019 2:37 PM
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
## 2019-08-15 ENCOUNTER — TELEPHONE (OUTPATIENT)
Dept: PSYCHIATRY | Facility: CLINIC | Age: 59
End: 2019-08-15

## 2019-08-15 DIAGNOSIS — F40.01 PANIC DISORDER WITH AGORAPHOBIA: ICD-10-CM

## 2019-08-15 DIAGNOSIS — F43.10 POST TRAUMATIC STRESS DISORDER (PTSD): ICD-10-CM

## 2019-08-15 RX ORDER — VILAZODONE HYDROCHLORIDE 20 MG/1
TABLET ORAL
Qty: 90 TABLET | Refills: 0 | Status: SHIPPED | OUTPATIENT
Start: 2019-08-15 | End: 2019-11-11 | Stop reason: SDUPTHER

## 2019-08-15 RX ORDER — DIAZEPAM 2 MG/1
2 TABLET ORAL 2 TIMES DAILY PRN
Qty: 60 TABLET | Refills: 0 | Status: CANCELLED | OUTPATIENT
Start: 2019-08-15

## 2019-08-21 DIAGNOSIS — F43.10 POST TRAUMATIC STRESS DISORDER (PTSD): ICD-10-CM

## 2019-08-21 DIAGNOSIS — F51.05 INSOMNIA DUE TO MENTAL DISORDER: ICD-10-CM

## 2019-08-21 RX ORDER — PRAZOSIN HYDROCHLORIDE 2 MG/1
6 CAPSULE ORAL NIGHTLY
Qty: 90 CAPSULE | Refills: 2 | Status: SHIPPED | OUTPATIENT
Start: 2019-08-21 | End: 2019-11-21 | Stop reason: SDUPTHER

## 2019-09-03 ENCOUNTER — OFFICE VISIT (OUTPATIENT)
Dept: PSYCHIATRY | Facility: CLINIC | Age: 59
End: 2019-09-03

## 2019-09-03 VITALS
HEART RATE: 102 BPM | WEIGHT: 250.6 LBS | BODY MASS INDEX: 40.27 KG/M2 | SYSTOLIC BLOOD PRESSURE: 150 MMHG | HEIGHT: 66 IN | DIASTOLIC BLOOD PRESSURE: 90 MMHG

## 2019-09-03 DIAGNOSIS — R46.89 AVOIDANT BEHAVIOR: ICD-10-CM

## 2019-09-03 DIAGNOSIS — F40.01 PANIC DISORDER WITH AGORAPHOBIA: ICD-10-CM

## 2019-09-03 DIAGNOSIS — G47.9 SLEEPING DIFFICULTY: ICD-10-CM

## 2019-09-03 DIAGNOSIS — Z79.899 MEDICATION MANAGEMENT: ICD-10-CM

## 2019-09-03 DIAGNOSIS — F51.05 INSOMNIA DUE TO MENTAL DISORDER: ICD-10-CM

## 2019-09-03 DIAGNOSIS — F34.1 PERSISTENT DEPRESSIVE DISORDER WITH ANXIOUS DISTRESS, CURRENTLY MODERATE: Primary | ICD-10-CM

## 2019-09-03 DIAGNOSIS — F43.10 POST TRAUMATIC STRESS DISORDER (PTSD): ICD-10-CM

## 2019-09-03 PROCEDURE — 90833 PSYTX W PT W E/M 30 MIN: CPT | Performed by: NURSE PRACTITIONER

## 2019-09-03 PROCEDURE — 99214 OFFICE O/P EST MOD 30 MIN: CPT | Performed by: NURSE PRACTITIONER

## 2019-09-03 RX ORDER — QUETIAPINE FUMARATE 200 MG/1
200 TABLET, FILM COATED ORAL NIGHTLY
Qty: 30 TABLET | Refills: 2 | Status: SHIPPED | OUTPATIENT
Start: 2019-09-03 | End: 2019-11-26 | Stop reason: SDUPTHER

## 2019-09-03 RX ORDER — METHYLPHENIDATE HYDROCHLORIDE 20 MG/1
TABLET ORAL
Qty: 90 TABLET | Refills: 0 | Status: SHIPPED | OUTPATIENT
Start: 2019-09-03 | End: 2019-10-08 | Stop reason: SDUPTHER

## 2019-09-03 RX ORDER — BUPROPION HYDROCHLORIDE 300 MG/1
300 TABLET ORAL EVERY MORNING
Qty: 30 TABLET | Refills: 2 | Status: SHIPPED | OUTPATIENT
Start: 2019-09-03 | End: 2019-11-26 | Stop reason: SDUPTHER
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
## 2019-09-10 DIAGNOSIS — F43.10 POST TRAUMATIC STRESS DISORDER (PTSD): ICD-10-CM

## 2019-09-10 DIAGNOSIS — F40.01 PANIC DISORDER WITH AGORAPHOBIA: ICD-10-CM

## 2019-09-10 RX ORDER — DIAZEPAM 2 MG/1
2 TABLET ORAL 2 TIMES DAILY PRN
Qty: 60 TABLET | Refills: 0 | Status: SHIPPED | OUTPATIENT
Start: 2019-09-10 | End: 2019-10-09 | Stop reason: SDUPTHER

## 2019-10-02 ENCOUNTER — OFFICE VISIT (OUTPATIENT)
Dept: PSYCHIATRY | Facility: CLINIC | Age: 59
End: 2019-10-02

## 2019-10-02 DIAGNOSIS — F40.01 PANIC DISORDER WITH AGORAPHOBIA: ICD-10-CM

## 2019-10-02 DIAGNOSIS — F34.1 PERSISTENT DEPRESSIVE DISORDER WITH ANXIOUS DISTRESS, CURRENTLY MODERATE: Primary | ICD-10-CM

## 2019-10-02 DIAGNOSIS — F43.10 POST TRAUMATIC STRESS DISORDER (PTSD): ICD-10-CM

## 2019-10-02 DIAGNOSIS — F51.05 INSOMNIA DUE TO MENTAL DISORDER: ICD-10-CM

## 2019-10-02 DIAGNOSIS — R46.89 AVOIDANT BEHAVIOR: ICD-10-CM

## 2019-10-02 DIAGNOSIS — Z63.0 MARITAL/PARTNER RELATIONAL PROBLEM: ICD-10-CM

## 2019-10-02 PROCEDURE — 90834 PSYTX W PT 45 MINUTES: CPT | Performed by: SOCIAL WORKER

## 2019-10-02 SDOH — SOCIAL STABILITY - SOCIAL INSECURITY: PROBLEMS IN RELATIONSHIP WITH SPOUSE OR PARTNER: Z63.0
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
## 2019-10-08 DIAGNOSIS — F34.1 PERSISTENT DEPRESSIVE DISORDER WITH ANXIOUS DISTRESS, CURRENTLY MODERATE: ICD-10-CM

## 2019-10-08 RX ORDER — METHYLPHENIDATE HYDROCHLORIDE 20 MG/1
TABLET ORAL
Qty: 90 TABLET | Refills: 0 | Status: SHIPPED | OUTPATIENT
Start: 2019-10-08 | End: 2019-11-11 | Stop reason: SDUPTHER

## 2019-10-09 DIAGNOSIS — F43.10 POST TRAUMATIC STRESS DISORDER (PTSD): ICD-10-CM

## 2019-10-09 DIAGNOSIS — F40.01 PANIC DISORDER WITH AGORAPHOBIA: ICD-10-CM

## 2019-10-09 RX ORDER — DIAZEPAM 2 MG/1
2 TABLET ORAL 2 TIMES DAILY PRN
Qty: 60 TABLET | Refills: 0 | Status: SHIPPED | OUTPATIENT
Start: 2019-10-09 | End: 2019-11-26 | Stop reason: SDUPTHER

## 2019-11-11 DIAGNOSIS — F34.1 PERSISTENT DEPRESSIVE DISORDER WITH ANXIOUS DISTRESS, CURRENTLY MODERATE: ICD-10-CM

## 2019-11-11 RX ORDER — METHYLPHENIDATE HYDROCHLORIDE 20 MG/1
TABLET ORAL
Qty: 90 TABLET | Refills: 0 | Status: SHIPPED | OUTPATIENT
Start: 2019-11-11 | End: 2019-11-26 | Stop reason: SDUPTHER

## 2019-11-11 RX ORDER — VILAZODONE HYDROCHLORIDE 20 MG/1
TABLET ORAL
Qty: 90 TABLET | Refills: 0 | Status: SHIPPED | OUTPATIENT
Start: 2019-11-11 | End: 2020-02-18

## 2019-11-21 DIAGNOSIS — F43.10 POST TRAUMATIC STRESS DISORDER (PTSD): ICD-10-CM

## 2019-11-21 DIAGNOSIS — F51.05 INSOMNIA DUE TO MENTAL DISORDER: ICD-10-CM

## 2019-11-21 RX ORDER — PRAZOSIN HYDROCHLORIDE 2 MG/1
6 CAPSULE ORAL NIGHTLY
Qty: 90 CAPSULE | Refills: 2 | Status: SHIPPED | OUTPATIENT
Start: 2019-11-21 | End: 2020-02-18 | Stop reason: SDUPTHER

## 2019-11-26 ENCOUNTER — OFFICE VISIT (OUTPATIENT)
Dept: PSYCHIATRY | Facility: CLINIC | Age: 59
End: 2019-11-26

## 2019-11-26 VITALS
WEIGHT: 252.4 LBS | HEIGHT: 66 IN | SYSTOLIC BLOOD PRESSURE: 149 MMHG | DIASTOLIC BLOOD PRESSURE: 69 MMHG | HEART RATE: 106 BPM | BODY MASS INDEX: 40.56 KG/M2

## 2019-11-26 DIAGNOSIS — Z79.899 MEDICATION MANAGEMENT: ICD-10-CM

## 2019-11-26 DIAGNOSIS — Z63.0 MARITAL/PARTNER RELATIONAL PROBLEM: ICD-10-CM

## 2019-11-26 DIAGNOSIS — F34.1 PERSISTENT DEPRESSIVE DISORDER WITH ANXIOUS DISTRESS, CURRENTLY MODERATE: Primary | ICD-10-CM

## 2019-11-26 DIAGNOSIS — F43.10 POST TRAUMATIC STRESS DISORDER (PTSD): ICD-10-CM

## 2019-11-26 DIAGNOSIS — R46.89 AVOIDANT BEHAVIOR: ICD-10-CM

## 2019-11-26 DIAGNOSIS — G47.9 SLEEPING DIFFICULTY: ICD-10-CM

## 2019-11-26 DIAGNOSIS — F51.05 INSOMNIA DUE TO MENTAL DISORDER: ICD-10-CM

## 2019-11-26 PROCEDURE — 99214 OFFICE O/P EST MOD 30 MIN: CPT | Performed by: NURSE PRACTITIONER

## 2019-11-26 PROCEDURE — 90833 PSYTX W PT W E/M 30 MIN: CPT | Performed by: NURSE PRACTITIONER

## 2019-11-26 RX ORDER — QUETIAPINE FUMARATE 200 MG/1
200 TABLET, FILM COATED ORAL NIGHTLY
Qty: 30 TABLET | Refills: 2 | Status: SHIPPED | OUTPATIENT
Start: 2019-11-26 | End: 2020-02-18 | Stop reason: SDUPTHER

## 2019-11-26 RX ORDER — DIAZEPAM 2 MG/1
2 TABLET ORAL 2 TIMES DAILY PRN
Qty: 60 TABLET | Refills: 0 | Status: SHIPPED | OUTPATIENT
Start: 2019-11-26 | End: 2020-01-09 | Stop reason: SDUPTHER

## 2019-11-26 RX ORDER — METHYLPHENIDATE HYDROCHLORIDE 20 MG/1
TABLET ORAL
Qty: 90 TABLET | Refills: 0 | Status: SHIPPED | OUTPATIENT
Start: 2019-11-26 | End: 2020-01-09 | Stop reason: SDUPTHER

## 2019-11-26 RX ORDER — BUPROPION HYDROCHLORIDE 300 MG/1
300 TABLET ORAL EVERY MORNING
Qty: 30 TABLET | Refills: 2 | Status: SHIPPED | OUTPATIENT
Start: 2019-11-26 | End: 2020-02-05 | Stop reason: SDUPTHER

## 2019-11-26 SDOH — SOCIAL STABILITY - SOCIAL INSECURITY: PROBLEMS IN RELATIONSHIP WITH SPOUSE OR PARTNER: Z63.0

## 2019-11-26 NOTE — PROGRESS NOTES
"Outpatient Psychiatric Progress Note     CC: medication management for depression/anxiety    INTERVAL HISTORY:  She comes in today reporting she has been doing \"okay.\" She is having Thanksgiving at her house with her daughter, son-in-law, , and grandchildren and isn't stressed out about it. She reports ongoing sleep difficulty having restless nights of sleeping. She reports being up this morning at 3:30am and became stir crazy so she went to [email or web address] and walked around so she would stop ruminating and get upset. She is no longer going to the gym making several reasons why including her last experience of getting the vehicle broken into, obsessing about going that she wakes up early, too many people and they are looking at her. She has hx of sleep apnea and should be wearing CPAP however she doesn't like how it feels on her face making her feel smothered. She reports feeling as though she won't live another 10 years \"because my mom  at 70 of Parkinson's due to high doses of psych meds she was on.\" She recalls her mother smoking for 40 years. She endorses anger towards her father as she felt he moved them to [email or web address] to be close to her mother's parents while he stayed in [place] to work. She recalls triggers of when her  wants the blinds closed and she prefers them open as she re-experiences all the times after school coming home and sitting on the couch in the dark for hours while their mother slept. She recalls feeling punished while growing up, not understanding why her mother tried to kill herself. She was the middle child and her sister is very mentally ill living in [place] but will call pt several times a day until pt stopped answering. Since then she has stopped calling so frequently and now they are able to have better conversations without pt being angry for her calling multiple times. Pt becomes tearful during today's session and requests to stop as she feels pushed, bringing up emotion she " has been suppressing for so long. She states she didn't visit [place] because her grandchildren needed her to [place]. Pt ruminates on past trauma. Denies SI/HI/AH/VH. She reports good appetite, tolerating diet. She wished she'd not have to take so many medications and it was explained they are used to treat symptoms. Pt has negative self-talk and is resistant to change. She takes meds as prescribed, denies SE. No new medical concerns.     The following portions of the patient's history were reviewed and updated as appropriate: allergies, current medications, past family history, past medical history, past social history, past surgical history and problem list.    I have reviewed pt's allergies and current medications.   Current Outpatient Medications   Medication Sig Dispense Refill   • atorvastatin (LIPITOR) 20 MG tablet      • buPROPion XL (WELLBUTRIN XL) 300 MG 24 hr tablet Take 1 tablet by mouth Every Morning. 30 tablet 2   • diazePAM (VALIUM) 2 MG tablet Take 1 tablet by mouth 2 (Two) Times a Day As Needed for Anxiety. 60 tablet 0   • fluticasone (FLONASE) 50 MCG/ACT nasal spray 2 sprays into the nostril(s) as directed by provider Daily.     • HYDROcodone-acetaminophen (NORCO) 7.5-325 MG per tablet      • levothyroxine (SYNTHROID, LEVOTHROID) 100 MCG tablet Daily.     • methylphenidate (RITALIN) 20 MG tablet TAKE 1 1/2 TABLETS BY MOUTH TWICE A DAY 90 tablet 0   • omeprazole (priLOSEC) 20 MG capsule      • prazosin (MINIPRESS) 2 MG capsule TAKE 3 CAPSULES BY MOUTH EVERY NIGHT. 90 capsule 2   • QUEtiapine (SEROquel) 200 MG tablet Take 1 tablet by mouth Every Night. 30 tablet 2   • VIIBRYD 20 MG tablet tablet TAKE 1 TABLET BY MOUTH EVERY DAY 90 tablet 0     No current facility-administered medications for this visit.        Allergies   Allergen Reactions   • Codeine Hives and Itching     Review of Systems   Constitutional: Negative for activity change, appetite change and fatigue.   HENT: Negative.    Eyes:  "Negative.  Negative for visual disturbance.   Respiratory: Negative.    Cardiovascular: Negative.    Gastrointestinal: Negative.  Negative for nausea.   Endocrine: Negative.    Genitourinary: Negative.    Musculoskeletal: Positive for arthralgias, back pain and myalgias.   Skin: Negative.    Allergic/Immunologic: Negative.    Neurological: Negative.  Negative for dizziness, seizures and headaches.   Hematological: Negative.    Psychiatric/Behavioral: Positive for agitation, dysphoric mood and sleep disturbance. Negative for behavioral problems, confusion, decreased concentration, hallucinations, self-injury and suicidal ideas. The patient is nervous/anxious. The patient is not hyperactive.      /69   Pulse 106   Ht 167.6 cm (65.98\")   Wt 114 kg (252 lb 6.4 oz)   BMI 40.76 kg/m²     MENTAL STATUS EXAM:  Appearance:Neatly, casually dressed, good hygeine.   Cooperation:Cooperative  Orientation: Ox4  Gait and station stable.   Psychomotor: No psychomotor agitation/retardation, No EPS, No motor tics  Speech-normal rate, amount.  Mood - anxious/agitated   Affect-constricted, anxious appearing  Thought Content-goal directed, no delusional material present  Thought process-linear, organized.  Suicidality: No SI  Homicidality: No HI  Perception: No AH/VH  Memory is intact for recent and remote events  Concentration: good  Impulse control-good  Insight- limited  Judgement-fair    Physical Exam   Constitutional: She is oriented to person, place, and time. She appears well-developed and well-nourished. No distress.   Neurological: She is alert and oriented to person, place, and time.   Skin: She is not diaphoretic.   Vitals reviewed.      ASSESSMENT AND PLAN  Functionality: pt having improvement in symptoms and able to carry out activities of daily functioning.    Prognosis: Guarded dependent on medication/follow up and treatment plan compliance.  Body mass index is 40.76 kg/m².  Patient was educated on healthier and " more balanced diet choices and encouraged exercise within physical limitations.     Diagnosis Plan   1. Persistent depressive disorder with anxious distress, currently moderate  buPROPion XL (WELLBUTRIN XL) 300 MG 24 hr tablet    methylphenidate (RITALIN) 20 MG tablet   2. Post traumatic stress disorder (PTSD)  diazePAM (VALIUM) 2 MG tablet    QUEtiapine (SEROquel) 200 MG tablet   3. Marital/partner relational problem     4. Sleeping difficulty  QUEtiapine (SEROquel) 200 MG tablet   5. Insomnia due to mental disorder  QUEtiapine (SEROquel) 200 MG tablet   6. Avoidant behavior  QUEtiapine (SEROquel) 200 MG tablet   7. Medication management           Plan:  1. Continue Viibryd at 20 mg daily  2. Continue Ritalin 20mg twice daily and discontinue Topamax as there was no efficacy noted.  3. Valium 2mg bid prn for exacerbated anxiety  4. Continue Seroquel for sleep  5. Continue  Wellbutrin  mg daily  for mood  6. Continue prazosin for nightmares  7. Supportive psychotherapy  8. Psychotherapy with Hussein BLOCKW  Body mass index is 40.76 kg/m².. patient was educated to eat healthier diet choices and encouraged exercise.    In 8:55am- Out 9:25am (30 min) spent for supportive psychotherapy. Assisted patient in processing pt's stressors including marital conflict, past trauma unresolved guilt/anger with mother/father  and motivation for change. Acknowledged and normalized patient’s thoughts, feelings, and concerns. Utilized cognitive behavioral therapy to assist the patient in recognizing more appropriate coping mechanisms when she becomes agitated/anxious which are proven effective in reducing the severity of frequency of symptoms. Strongly urged her to continue to eat more well balanced and healthier foods in reducing further health risks. There was unconditional positive regard toward patient. Allowed patient to freely discuss issues without interruption or judgment. Encouraged pt to expose self slowly to CPAP in  promoting sleep. Encouraged her to consider visiting father in [place] to resolve questions that cause her distress regarding childhood.    RTC 12 weeks or sooner if needed. Call with questions/concerns.   BHUMI and Vince reviewed in compliance with treatment plan    Thu Jackson, APRN

## 2019-12-11 ENCOUNTER — OFFICE VISIT (OUTPATIENT)
Dept: PSYCHIATRY | Facility: CLINIC | Age: 59
End: 2019-12-11

## 2019-12-11 DIAGNOSIS — Z63.0 MARITAL/PARTNER RELATIONAL PROBLEM: ICD-10-CM

## 2019-12-11 DIAGNOSIS — R46.89 AVOIDANT BEHAVIOR: ICD-10-CM

## 2019-12-11 DIAGNOSIS — F43.10 POST TRAUMATIC STRESS DISORDER (PTSD): ICD-10-CM

## 2019-12-11 DIAGNOSIS — F51.05 INSOMNIA DUE TO MENTAL DISORDER: ICD-10-CM

## 2019-12-11 DIAGNOSIS — F34.1 PERSISTENT DEPRESSIVE DISORDER WITH ANXIOUS DISTRESS, CURRENTLY MODERATE: Primary | ICD-10-CM

## 2019-12-11 PROCEDURE — 90834 PSYTX W PT 45 MINUTES: CPT | Performed by: SOCIAL WORKER

## 2019-12-11 SDOH — SOCIAL STABILITY - SOCIAL INSECURITY: PROBLEMS IN RELATIONSHIP WITH SPOUSE OR PARTNER: Z63.0

## 2019-12-11 NOTE — PROGRESS NOTES
Date of Service: December 11, 2019  Time In: 8:05 AM  Time Out: 8:55 AM      PROGRESS NOTE  Data:  Delphine Mcmillan is a 59 y.o. female who met 1: 1 with the undersigned for a regularly scheduled individual outpatient therapy session at the [facility] for follow-up of panic disorder, depression, and marital strain.       HPI: Patient reports she and her  continue to struggle and states he recently brought up an incident from approximately 30 years ago which has really upset the patient.  Patient reports approximately 30 years ago a coworker become infatuated with her which ultimately resulted resulted in the coworker leaving the job.  The patient is adamant nothing ever happened.  However, she states her  recently told her he believes she did have an affair and was upset.  Patient reports she was shaken by this and states she is not sure how to interpret it.  She also reports she has at least consider the fact that they might separate.  The patient continues to struggle with significant social isolation and avoids being out in public when possible.  She continues to spend the vast majority of her time at home and even spends a great deal of her time in her room.  Patient continues to struggle with depression as evidenced by depressed mood, anhedonia, anergia, periods of hopelessness, negative self image, and severe social isolation.  Patient rates current symptoms of depression at an 8 on a scale 1-10 with 10 being most severe.  Patient also continues to struggle with posttraumatic stress disorder as evidenced by hypervigilance, increased startle response, unwanted thoughts, and periodic traumatic dreams.  Patient rates current symptoms of PTSD at 5 on a scale of 1-10 with 10 being most severe.  Patient reports she is sleeping relatively well with medication.  Patient reports she continues to adhere to medication regimen as prescribed.  Patient adamantly convincingly denies suicidal ideation and  "vehemently denies any substance use.    Patient states \"I feel like just shutting myself off\".      Clinical Maneuvering/Intervention:  Assisted patient in processing above session content; acknowledged and normalized patient’s thoughts, feelings, and concerns.  Allowed the patient to discuss/vent her feelings of frustration concerning ongoing marital strain and validated her feelings.  This session was primarily focused on assisting the patient with developing a strategy to address this issue.  Utilized motivational reviewed techniques including complex reflections to discussed the importance of communication and anti-didactic relationship and discussed the use of \"I\" statements and reflective listening.  Also assisted the patient in identifying her tendency to become extremely negative in the face of stressors and assisted her with developing a strategy to mitigate this by focusing on a positive thought process.  Provided unconditional positive regard and a safe, supportive environment.    Allowed patient to freely discuss issues without interruption or judgment. Provided safe, confidential environment to facilitate the development of positive therapeutic relationship and encourage open, honest communication. Assisted patient in identifying risk factors which would indicate the need for higher level of care including thoughts to harm self or others and/or self-harming behavior and encouraged patient to contact this office, call 911, or present to the nearest emergency room should any of these events occur. Discussed crisis intervention services and means to access.  Patient adamantly and convincingly denies current suicidal or homicidal ideation or perceptual disturbance.      Assessment    Patient symptoms appear to be exacerbated this time due to significant ongoing marital strain and is likely susceptible to ongoing exacerbation if not mitigated.  Patient's symptomology continues to cause significant impairment " in important areas of functioning.  As a result, the patient can be reasonably expected to benefit from ongoing treatment.    Diagnoses and all orders for this visit:    Persistent depressive disorder with anxious distress, currently moderate    Post traumatic stress disorder (PTSD)    Marital/partner relational problem    Insomnia due to mental disorder    Avoidant behavior               Mental Status Exam  Hygiene:  good  Dress:  casual  Attitude:  Cooperative  Motor Activity:  Appropriate  Speech:  Monotone  Mood:  depressed  Affect:  depressed  Thought Processes:  Linear  Thought Content:  normal  Suicidal Thoughts: Denies  Homicidal Thoughts:  denies  Crisis Safety Plan: yes, to come to the emergency room.  Hallucinations:  denies    Patient's Support Network Includes:  children    Progress toward goal: Not at goal    Functional Status: Severe impairment    Prognosis: Guarded with Ongoing Treatment      Plan         Patient will continue in individual outpatient therapy session at the [facility] in approximately 1 week and will continue in pharmacotherapy as scheduled with PO Greer.  Frequency of treatment will continue to be reassessed.  Patient will adhere to medication regimen as prescribed and report any side effects. Patient will contact this office, call 911 or present to the nearest emergency room should suicidal or homicidal ideations occur. Provide Cognitive Behavioral Therapy and Integrative Therapy to improve functioning, maintain stability, and avoid decompensation and the need for higher level of care.          Return in about 1 week (around 12/18/2019) for Next scheduled follow up.      This document signed by Hussein Cruz LCSW, [facility] December 11, 2019 1:35 PM

## 2019-12-18 ENCOUNTER — OFFICE VISIT (OUTPATIENT)
Dept: PSYCHIATRY | Facility: CLINIC | Age: 59
End: 2019-12-18

## 2019-12-18 DIAGNOSIS — R46.89 AVOIDANT BEHAVIOR: ICD-10-CM

## 2019-12-18 DIAGNOSIS — Z63.0 MARITAL/PARTNER RELATIONAL PROBLEM: ICD-10-CM

## 2019-12-18 DIAGNOSIS — F51.05 INSOMNIA DUE TO MENTAL DISORDER: ICD-10-CM

## 2019-12-18 DIAGNOSIS — F34.1 PERSISTENT DEPRESSIVE DISORDER WITH ANXIOUS DISTRESS, CURRENTLY MODERATE: Primary | ICD-10-CM

## 2019-12-18 DIAGNOSIS — F43.10 POST TRAUMATIC STRESS DISORDER (PTSD): ICD-10-CM

## 2019-12-18 PROCEDURE — 90834 PSYTX W PT 45 MINUTES: CPT | Performed by: SOCIAL WORKER

## 2019-12-18 SDOH — SOCIAL STABILITY - SOCIAL INSECURITY: PROBLEMS IN RELATIONSHIP WITH SPOUSE OR PARTNER: Z63.0

## 2019-12-18 NOTE — PROGRESS NOTES
Date of Service: December 18, 2019  Time In: 9:50 AM  Time Out: 10:40 AM      PROGRESS NOTE  Data:  Delphine Mcmillan is a 59 y.o. female who met 1: 1 with the undersigned for a regularly scheduled individual outpatient therapy session at the [facility] for follow-up of panic disorder, depression, and marital strain.       HPI: Patient reports nothing has changed at home and states she continues to feel as though her  is accusing her of infidelity from an incident that happened approximately 20 years ago where she did not do anything wrong.  Patient also reports that she feels as though she is not important to her  and states he seems to have higher priorities than spending time with her.  She continues to spend the vast majority of her time at home and even spends a great deal of her time in her room.  Patient continues to struggle with depression as evidenced by depressed mood, anhedonia, anergia, periods of hopelessness, negative self image, and severe social isolation.  Patient rates current symptoms of depression at an 8 on a scale 1-10 with 10 being most severe.  Patient also continues to struggle with posttraumatic stress disorder as evidenced by hypervigilance, increased startle response, unwanted thoughts, and periodic traumatic dreams.  Patient rates current symptoms of PTSD at 5 on a scale of 1-10 with 10 being most severe.  Patient reports she is sleeping relatively well with medication.  Patient reports she continues to adhere to medication regimen as prescribed.  Patient adamantly convincingly denies suicidal ideation and vehemently denies any substance use.        Clinical Maneuvering/Intervention:  Assisted patient in processing above session content; acknowledged and normalized patient’s thoughts, feelings, and concerns.  This session was primarily focused on assisting the patient with addressing her marital strain at this time as this appears to be the primary issue on this date.   "Validated patient's feelings and reminded her that she has a right to make her feelings known to her .  Discussed the importance of communication in any didactic relationship and discussed the use of \"I\" statements and reflective listening.  Also assisted the patient in developing a strategy to discussed this with her  without becoming angry and argumentative and encouraged her to consider either writing him a letter or writing herself some notes.  Also validated the patient's belief she feels as though she and her  are not spending enough time together and encouraged her to let her  knows how she feels.  Provided unconditional positive regard and a safe, supportive environment.    Allowed patient to freely discuss issues without interruption or judgment. Provided safe, confidential environment to facilitate the development of positive therapeutic relationship and encourage open, honest communication. Assisted patient in identifying risk factors which would indicate the need for higher level of care including thoughts to harm self or others and/or self-harming behavior and encouraged patient to contact this office, call 911, or present to the nearest emergency room should any of these events occur. Discussed crisis intervention services and means to access.  Patient adamantly and convincingly denies current suicidal or homicidal ideation or perceptual disturbance.      Assessment    Patient symptoms appear to be exacerbated this time due to significant ongoing marital strain and is likely susceptible to ongoing exacerbation if not mitigated.  Patient's symptomology continues to cause significant impairment in important areas of functioning.  As a result, the patient can be reasonably expected to benefit from ongoing treatment.    Diagnoses and all orders for this visit:    Persistent depressive disorder with anxious distress, currently moderate    Post traumatic stress disorder " (PTSD)    Marital/partner relational problem    Insomnia due to mental disorder    Avoidant behavior               Mental Status Exam  Hygiene:  good  Dress:  casual  Attitude:  Cooperative  Motor Activity:  Appropriate  Speech:  Monotone  Mood:  depressed  Affect:  depressed  Thought Processes:  Linear  Thought Content:  normal  Suicidal Thoughts: Denies  Homicidal Thoughts:  denies  Crisis Safety Plan: yes, to come to the emergency room.  Hallucinations:  denies    Patient's Support Network Includes:  children    Progress toward goal: Not at goal    Functional Status: Severe impairment    Prognosis: Guarded with Ongoing Treatment      Plan         Patient will continue in individual outpatient therapy session at the [facility] in approximately 4 weeks and will continue in pharmacotherapy as scheduled with PO Greer.  Frequency of treatment will continue to be reassessed.  Patient will adhere to medication regimen as prescribed and report any side effects. Patient will contact this office, call 911 or present to the nearest emergency room should suicidal or homicidal ideations occur. Provide Cognitive Behavioral Therapy and Integrative Therapy to improve functioning, maintain stability, and avoid decompensation and the need for higher level of care.          Return in about 4 weeks (around 1/15/2020) for Next scheduled follow up.      This document signed by Hussein Cruz LCSW, [facility] December 18, 2019 3:11 PM

## 2020-01-09 DIAGNOSIS — F43.10 POST TRAUMATIC STRESS DISORDER (PTSD): ICD-10-CM

## 2020-01-09 DIAGNOSIS — F34.1 PERSISTENT DEPRESSIVE DISORDER WITH ANXIOUS DISTRESS, CURRENTLY MODERATE: ICD-10-CM

## 2020-01-10 DIAGNOSIS — F34.1 PERSISTENT DEPRESSIVE DISORDER WITH ANXIOUS DISTRESS, CURRENTLY MODERATE: ICD-10-CM

## 2020-01-13 RX ORDER — METHYLPHENIDATE HYDROCHLORIDE 20 MG/1
TABLET ORAL
Qty: 90 TABLET | Refills: 0 | OUTPATIENT
Start: 2020-01-13

## 2020-01-13 RX ORDER — METHYLPHENIDATE HYDROCHLORIDE 20 MG/1
TABLET ORAL
Qty: 90 TABLET | Refills: 0 | Status: SHIPPED | OUTPATIENT
Start: 2020-01-13 | End: 2020-02-05 | Stop reason: SDUPTHER

## 2020-01-13 RX ORDER — DIAZEPAM 2 MG/1
2 TABLET ORAL 2 TIMES DAILY PRN
Qty: 60 TABLET | Refills: 0 | Status: SHIPPED | OUTPATIENT
Start: 2020-01-13 | End: 2020-02-18 | Stop reason: SDUPTHER

## 2020-02-05 DIAGNOSIS — F34.1 PERSISTENT DEPRESSIVE DISORDER WITH ANXIOUS DISTRESS, CURRENTLY MODERATE: ICD-10-CM

## 2020-02-06 RX ORDER — BUPROPION HYDROCHLORIDE 300 MG/1
300 TABLET ORAL EVERY MORNING
Qty: 30 TABLET | Refills: 2 | Status: SHIPPED | OUTPATIENT
Start: 2020-02-06 | End: 2020-04-14 | Stop reason: SDUPTHER

## 2020-02-06 RX ORDER — METHYLPHENIDATE HYDROCHLORIDE 20 MG/1
TABLET ORAL
Qty: 90 TABLET | Refills: 0 | Status: SHIPPED | OUTPATIENT
Start: 2020-02-06 | End: 2020-02-18 | Stop reason: SDUPTHER

## 2020-02-12 ENCOUNTER — OFFICE VISIT (OUTPATIENT)
Dept: PSYCHIATRY | Facility: CLINIC | Age: 60
End: 2020-02-12

## 2020-02-12 DIAGNOSIS — F51.05 INSOMNIA DUE TO MENTAL DISORDER: ICD-10-CM

## 2020-02-12 DIAGNOSIS — Z63.0 MARITAL/PARTNER RELATIONAL PROBLEM: ICD-10-CM

## 2020-02-12 DIAGNOSIS — F34.1 PERSISTENT DEPRESSIVE DISORDER WITH ANXIOUS DISTRESS, CURRENTLY MODERATE: Primary | ICD-10-CM

## 2020-02-12 DIAGNOSIS — F43.10 POST TRAUMATIC STRESS DISORDER (PTSD): ICD-10-CM

## 2020-02-12 DIAGNOSIS — R46.89 AVOIDANT BEHAVIOR: ICD-10-CM

## 2020-02-12 PROCEDURE — 90837 PSYTX W PT 60 MINUTES: CPT | Performed by: SOCIAL WORKER

## 2020-02-12 SDOH — SOCIAL STABILITY - SOCIAL INSECURITY: PROBLEMS IN RELATIONSHIP WITH SPOUSE OR PARTNER: Z63.0

## 2020-02-12 NOTE — PROGRESS NOTES
"Date of Service: February 12, 2020  Time In: 11:45 AM  Time Out: 12:45 PM      PROGRESS NOTE  Data:  Delphine Mcmillan is a 59 y.o. female who met 1: 1 with the undersigned for a regularly scheduled individual outpatient therapy session at the [facility] for follow-up of panic disorder, depression, and marital strain.       HPI: Patient reports past few weeks of been very difficult and states her  spent several days in the hospital due to kidney stones.  She reports he is back home now but continues to struggle with what she believes to be psychosomatic issues.  She states \"he thinks I am against him for some reason\".  The patient reports he continues to struggle and states he seems to be unable or unwilling to discuss their issues.  She reports she attempted to communicate as was discussed in our last session using reflective listening and I statements but states he simply attempted to \"turn it around me so everything was my fault\".  Patient reports she feels very miserable and states she feels as though they are simply living in the same house.  Patient states she does remember the last time she felt happy and states she does not look forward to anything.  Patient continues to struggle with depression as evidenced by depressed mood, anhedonia, anergia, periods of hopelessness, negative self image, and severe social isolation.  Patient rates current symptoms of depression at an 8 on a scale 1-10 with 10 being most severe.  Patient also continues to struggle with posttraumatic stress disorder as evidenced by hypervigilance, increased startle response, unwanted thoughts, and periodic traumatic dreams.  Patient rates current symptoms of PTSD at 4/5 on a scale of 1-10 with 10 being most severe.  Patient reports she is sleeping relatively well with medication.  Patient reports she continues to adhere to medication regimen as prescribed.  Patient adamantly convincingly denies suicidal ideation and vehemently " "denies any substance use.        Clinical Maneuvering/Intervention:  Assisted patient in processing above session content; acknowledged and normalized patient’s thoughts, feelings, and concerns.  This session was primarily focused on discussing the cognitive behavioral theory including identifying maladaptive thoughts and automatic negative cognitions which cause negative outcomes including a negative self image and low self-esteem.  Utilized cognitive behavioral therapy to assist the patient in identifying and acknowledging her automatic negative cognitions and maladaptive, irrational thoughts and challenging them with factual counter arguments.  Also assisted the patient in identifying her tendency to feel guilty for having feelings and encouraged her to challenge her tendency to apologize for feeling how she feels.  Also focused on the patient's self image and self-esteem and challenged her to remind herself \"we generally except what we think we deserve\".  Also confronted the patient with the fact she is the only one who can take steps to bring about positive change and encouraged her to remind herself that if she can change how she thinks she will change how she feels which in turn can enable her to begin to take steps to make her life better.  Provided unconditional positive regard and a safe, supportive environment.    Allowed patient to freely discuss issues without interruption or judgment. Provided safe, confidential environment to facilitate the development of positive therapeutic relationship and encourage open, honest communication. Assisted patient in identifying risk factors which would indicate the need for higher level of care including thoughts to harm self or others and/or self-harming behavior and encouraged patient to contact this office, call 911, or present to the nearest emergency room should any of these events occur. Discussed crisis intervention services and means to access.  Patient " adamantly and convincingly denies current suicidal or homicidal ideation or perceptual disturbance.      Assessment    Patient symptoms appear to be exacerbated this time due to significant ongoing marital strain and is likely susceptible to ongoing exacerbation if not mitigated.  Patient's symptomology continues to cause significant impairment in important areas of functioning.  As a result, the patient can be reasonably expected to benefit from ongoing treatment.    Diagnoses and all orders for this visit:    Persistent depressive disorder with anxious distress, currently moderate    Post traumatic stress disorder (PTSD)    Marital/partner relational problem    Insomnia due to mental disorder    Avoidant behavior               Mental Status Exam  Hygiene:  good  Dress:  casual  Attitude:  Cooperative  Motor Activity:  Appropriate  Speech:  Monotone  Mood:  depressed  Affect:  depressed  Thought Processes:  Linear  Thought Content:  normal  Suicidal Thoughts: Denies  Homicidal Thoughts:  denies  Crisis Safety Plan: yes, to come to the emergency room.  Hallucinations:  denies    Patient's Support Network Includes:  children    Progress toward goal: Not at goal    Functional Status: Severe impairment    Prognosis: Guarded with Ongoing Treatment      Plan         Patient will continue in individual outpatient therapy session at the [facility] in approximately 2 weeks and will continue in pharmacotherapy as scheduled with PO Greer.  Frequency of treatment will continue to be reassessed.  Patient will adhere to medication regimen as prescribed and report any side effects. Patient will contact this office, call 911 or present to the nearest emergency room should suicidal or homicidal ideations occur. Provide Cognitive Behavioral Therapy and Integrative Therapy to improve functioning, maintain stability, and avoid decompensation and the need for higher level of care.          Return in about 2 weeks (around  2/26/2020) for Next scheduled follow up.      This document signed by Hussein Cruz LCSW, [facility] February 12, 2020 2:25 PM

## 2020-02-18 ENCOUNTER — OFFICE VISIT (OUTPATIENT)
Dept: PSYCHIATRY | Facility: CLINIC | Age: 60
End: 2020-02-18

## 2020-02-18 VITALS
HEIGHT: 66 IN | SYSTOLIC BLOOD PRESSURE: 140 MMHG | BODY MASS INDEX: 41.14 KG/M2 | HEART RATE: 121 BPM | WEIGHT: 256 LBS | DIASTOLIC BLOOD PRESSURE: 81 MMHG

## 2020-02-18 DIAGNOSIS — R46.89 AVOIDANT BEHAVIOR: ICD-10-CM

## 2020-02-18 DIAGNOSIS — F51.05 INSOMNIA DUE TO MENTAL DISORDER: ICD-10-CM

## 2020-02-18 DIAGNOSIS — F43.10 POST TRAUMATIC STRESS DISORDER (PTSD): ICD-10-CM

## 2020-02-18 DIAGNOSIS — Z63.0 MARITAL/PARTNER RELATIONAL PROBLEM: ICD-10-CM

## 2020-02-18 DIAGNOSIS — Z79.899 MEDICATION MANAGEMENT: ICD-10-CM

## 2020-02-18 DIAGNOSIS — F34.1 PERSISTENT DEPRESSIVE DISORDER WITH ANXIOUS DISTRESS, CURRENTLY MODERATE: Primary | ICD-10-CM

## 2020-02-18 LAB
AMPHETAMINE CUT-OFF: ABNORMAL
BENZODIAZIPINE CUT-OFF: ABNORMAL
BUPRENORPHINE CUT-OFF: ABNORMAL
COCAINE CUT-OFF: ABNORMAL
EXTERNAL AMPHETAMINE SCREEN URINE: NEGATIVE
EXTERNAL BENZODIAZEPINE SCREEN URINE: POSITIVE
EXTERNAL BUPRENORPHINE SCREEN URINE: NEGATIVE
EXTERNAL COCAINE SCREEN URINE: NEGATIVE
EXTERNAL MDMA: NEGATIVE
EXTERNAL METHADONE SCREEN URINE: NEGATIVE
EXTERNAL METHAMPHETAMINE SCREEN URINE: POSITIVE
EXTERNAL OPIATES SCREEN URINE: POSITIVE
EXTERNAL OXYCODONE SCREEN URINE: NEGATIVE
EXTERNAL THC SCREEN URINE: NEGATIVE
MDMA CUT-OFF: ABNORMAL
METHADONE CUT-OFF: ABNORMAL
METHAMPHETAMINE CUT-OFF: ABNORMAL
OPIATES CUT-OFF: ABNORMAL
OXYCODONE CUT-OFF: ABNORMAL
THC CUT-OFF: ABNORMAL

## 2020-02-18 PROCEDURE — 90792 PSYCH DIAG EVAL W/MED SRVCS: CPT | Performed by: NURSE PRACTITIONER

## 2020-02-18 RX ORDER — QUETIAPINE FUMARATE 200 MG/1
200 TABLET, FILM COATED ORAL NIGHTLY
Qty: 30 TABLET | Refills: 2 | Status: SHIPPED | OUTPATIENT
Start: 2020-02-18 | End: 2020-04-07 | Stop reason: SDUPTHER

## 2020-02-18 RX ORDER — PRAZOSIN HYDROCHLORIDE 5 MG/1
10 CAPSULE ORAL NIGHTLY
Qty: 60 CAPSULE | Refills: 2 | Status: SHIPPED | OUTPATIENT
Start: 2020-02-18 | End: 2020-04-14 | Stop reason: SDUPTHER

## 2020-02-18 RX ORDER — AMOXICILLIN 875 MG/1
TABLET, COATED ORAL
COMMUNITY
Start: 2020-02-10 | End: 2020-09-29 | Stop reason: ALTCHOICE

## 2020-02-18 RX ORDER — DIAZEPAM 2 MG/1
2 TABLET ORAL 2 TIMES DAILY PRN
Qty: 60 TABLET | Refills: 0 | Status: SHIPPED | OUTPATIENT
Start: 2020-02-18 | End: 2020-06-15 | Stop reason: SDUPTHER

## 2020-02-18 RX ORDER — PRAZOSIN HYDROCHLORIDE 2 MG/1
6 CAPSULE ORAL NIGHTLY
Qty: 90 CAPSULE | Refills: 2 | OUTPATIENT
Start: 2020-02-18

## 2020-02-18 RX ORDER — METHYLPHENIDATE HYDROCHLORIDE 20 MG/1
TABLET ORAL
Qty: 90 TABLET | Refills: 0 | Status: SHIPPED | OUTPATIENT
Start: 2020-02-18 | End: 2020-04-14 | Stop reason: ALTCHOICE

## 2020-02-18 SDOH — SOCIAL STABILITY - SOCIAL INSECURITY: PROBLEMS IN RELATIONSHIP WITH SPOUSE OR PARTNER: Z63.0

## 2020-02-18 NOTE — PROGRESS NOTES
"Outpatient Psychiatric Progress Note     CC: medication management for depression/anxiety    INTERVAL HISTORY:  She comes in today reporting she has been doing \"okay.\" She was stressed after her  was in hospital around Jan 20 having a significant GI virus, dehydration, and there was concern about a colon blockage which turned out to be a kidney stone \"the size of a dime.\" He was on 3rd floor isolation and urology proceeded with lithotripsy. He has fibromyalgia and taking pain medication and had significant exacerbation of pain after discharged and returned to ED receiving significant amount of opioids \"which makes me feel like everyone thinks he's a drug addict seeking meds.\" He was re-admitted into observation, later admitted to  where his issues were resolved. Pt did not go up to [place] \"I was exhausted.\" She begins talking negatively regarding herself \"and what I should have done.\" Pt has downward eye contact during visit. She feels terrible about self as \"he was in so much pain, I didn't go up there.\" She then discusses his personality changing, more irritable since his pain issues have worsened.    Pt ruminates on all the mishaps that occur in her life when her  isn't home, such as water heater breaking, leaks in the house. Denies SI/HI/AH/VH. She reports good appetite, tolerating diet. She wished she'd not have to take so many medications and it was explained they are used to treat symptoms. Pt has negative self-talk and is resistant to change. She takes meds as prescribed, denies SE. No new medical concerns.   She has hx of sleep apnea and should be wearing CPAP however she doesn't like how it feels on her face making her feel smothered.  She stopped taking Viibryd due to affordability, hasn't seen that it was helpful while taking it. She is having ongoing nightmares, requests increase in prazosin.   The following portions of the patient's history were reviewed and updated as appropriate: " allergies, current medications, past family history, past medical history, past social history, past surgical history and problem list.  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 She is disabled. No substance abuse issues, never smoked, never used ETOH.  Family History   Problem Relation Age of Onset   • Anxiety disorder Mother    • Depression Mother    • Anxiety disorder Sister    • Depression Sister        I have reviewed pt's allergies and current medications.   Current Outpatient Medications   Medication Sig Dispense Refill   • amoxicillin (AMOXIL) 875 MG tablet      • atorvastatin (LIPITOR) 20 MG tablet      • buPROPion XL (WELLBUTRIN XL) 300 MG 24 hr tablet Take 1 tablet by mouth Every Morning. 30 tablet 2   • diazePAM (VALIUM) 2 MG tablet Take 1 tablet by mouth 2 (Two) Times a Day As Needed for Anxiety. 60 tablet 0   • fluticasone (FLONASE) 50 MCG/ACT nasal spray 2 sprays into the nostril(s) as directed by provider Daily.     • HYDROcodone-acetaminophen (NORCO) 7.5-325 MG per tablet      • levothyroxine (SYNTHROID, LEVOTHROID) 100 MCG tablet Daily.     • methylphenidate (RITALIN) 20 MG tablet TAKE 1 1/2 TABLETS BY MOUTH TWICE A DAY 90 tablet 0   • omeprazole (priLOSEC) 20 MG capsule      • prazosin (MINIPRESS) 5 MG capsule Take 2 capsules by mouth Every Night. 60 capsule 2   • QUEtiapine (SEROquel) 200 MG tablet Take 1 tablet by mouth Every Night. 30 tablet 2     No current facility-administered medications for this visit.        Allergies   Allergen Reactions   • Codeine Hives and Itching     Review of Systems   Constitutional: Negative for activity change, appetite change and fatigue.   HENT: Negative.    Eyes: Negative.  Negative for visual disturbance.   Respiratory: Negative.    Cardiovascular: Negative.   "  Gastrointestinal: Negative.  Negative for nausea.   Endocrine: Negative.    Genitourinary: Negative.    Musculoskeletal: Positive for arthralgias, back pain and myalgias.   Skin: Negative.    Allergic/Immunologic: Negative.    Neurological: Negative.  Negative for dizziness, seizures and headaches.   Hematological: Negative.    Psychiatric/Behavioral: Positive for agitation, dysphoric mood and sleep disturbance. Negative for behavioral problems, confusion, decreased concentration, hallucinations, self-injury and suicidal ideas. The patient is nervous/anxious. The patient is not hyperactive.      /81   Pulse (!) 121   Ht 167.6 cm (65.98\")   Wt 116 kg (256 lb)   BMI 41.34 kg/m²     MENTAL STATUS EXAM:  Appearance:Neatly, casually dressed, good hygeine.   Cooperation:Cooperative  Orientation: Ox4  Gait and station stable.   Psychomotor: No psychomotor agitation/retardation, No EPS, No motor tics  Speech-normal rate, amount.  Mood - anxious/agitated   Affect-constricted, anxious appearing  Thought Content-goal directed, no delusional material present  Thought process-linear, organized.  Suicidality: No SI  Homicidality: No HI  Perception: No AH/VH  Memory is intact for recent and remote events  Concentration: good  Impulse control-good  Insight- limited  Judgement-fair    Physical Exam   Constitutional: She is oriented to person, place, and time. She appears well-developed and well-nourished. No distress.   Neurological: She is alert and oriented to person, place, and time.   Skin: She is not diaphoretic.   Vitals reviewed.      ASSESSMENT AND PLAN  Functionality: pt having improvement in symptoms and able to carry out activities of daily functioning.    Prognosis: Guarded dependent on medication/follow up and treatment plan compliance.  Body mass index is 41.34 kg/m².  Patient was educated on healthier and more balanced diet choices and encouraged exercise within physical limitations.     Diagnosis Plan "   1. Persistent depressive disorder with anxious distress, currently moderate  methylphenidate (RITALIN) 20 MG tablet   2. Post traumatic stress disorder (PTSD)  prazosin (MINIPRESS) 5 MG capsule    QUEtiapine (SEROquel) 200 MG tablet    diazePAM (VALIUM) 2 MG tablet   3. Insomnia due to mental disorder  prazosin (MINIPRESS) 5 MG capsule    QUEtiapine (SEROquel) 200 MG tablet   4. Avoidant behavior  QUEtiapine (SEROquel) 200 MG tablet   5. Marital/partner relational problem     6. Medication management  [facility] Drug Screen         Plan:  1.DC Viibryd at 20 mg daily due to affordability  2. Continue Ritalin 20mg twice daily   3. Valium 2mg bid prn for exacerbated anxiety  4. Continue Seroquel for sleep  5. Continue  Wellbutrin  mg daily  for mood  6. Increase prazosin for nightmares  7. She has made appt with sleep center to set up sleep study to resume CPAP use  8. Psychotherapy with Hussein BLOCK  Body mass index is 41.34 kg/m².. patient was educated to eat healthier diet choices and encouraged exercise.  RTC 8-10 weeks or sooner if needed. Call with questions/concerns.   BHUMI and Vince reviewed in compliance with treatment plan   Risks benefits and side effects of medications were reviewed and patient states verbal understanding.  All questions and concerns were addressed.  Crisis plan in place that if symptoms worsen to come to the emergency department or call 911.    Thu Jackson, APRN

## 2020-02-26 ENCOUNTER — OFFICE VISIT (OUTPATIENT)
Dept: PSYCHIATRY | Facility: CLINIC | Age: 60
End: 2020-02-26

## 2020-02-26 DIAGNOSIS — Z63.0 MARITAL/PARTNER RELATIONAL PROBLEM: ICD-10-CM

## 2020-02-26 DIAGNOSIS — F34.1 PERSISTENT DEPRESSIVE DISORDER WITH ANXIOUS DISTRESS, CURRENTLY MODERATE: ICD-10-CM

## 2020-02-26 DIAGNOSIS — F43.10 POST TRAUMATIC STRESS DISORDER (PTSD): Primary | ICD-10-CM

## 2020-02-26 DIAGNOSIS — R46.89 AVOIDANT BEHAVIOR: ICD-10-CM

## 2020-02-26 DIAGNOSIS — F51.05 INSOMNIA DUE TO MENTAL DISORDER: ICD-10-CM

## 2020-02-26 PROCEDURE — 90837 PSYTX W PT 60 MINUTES: CPT | Performed by: SOCIAL WORKER

## 2020-02-26 SDOH — SOCIAL STABILITY - SOCIAL INSECURITY: PROBLEMS IN RELATIONSHIP WITH SPOUSE OR PARTNER: Z63.0

## 2020-03-04 NOTE — PROGRESS NOTES
"Date of Service: February 26, 2020  Time In: 11:45 AM  Time Out: 12:45 PM      PROGRESS NOTE  Data:  Delphine Mcmillan is a 59 y.o. female who met 1: 1 with the undersigned for a regularly scheduled individual outpatient therapy session at the [facility] for follow-up of posttraumatic stress disorder r, depression, and marital strain.       HPI: Patient reports she and her  continues to struggle and states she does not feel happy in the marriage.  However, she states she feels \"stuck\".  Patient reports they continue to have difficulty communicating and states she feels as though her issues or thoughts are never validated.  Patient continues to struggle with depression as evidenced by depressed mood, anhedonia, anergia, periods of hopelessness, negative self image, and severe social isolation.  Patient rates current symptoms of depression at an 8 on a scale 1-10 with 10 being most severe.  Patient also continues to struggle with posttraumatic stress disorder as evidenced by hypervigilance, increased startle response, unwanted thoughts, and periodic traumatic dreams.  Patient rates current symptoms of PTSD at 4 on a scale of 1-10 with 10 being most severe.  Patient reports she is sleeping relatively well with medication.  Patient reports she continues to adhere to medication regimen as prescribed.  Patient adamantly convincingly denies suicidal ideation and vehemently denies any substance use.        Clinical Maneuvering/Intervention:  Assisted patient in processing above session content; acknowledged and normalized patient’s thoughts, feelings, and concerns.  Utilized molecular reviewed techniques including complex reflections to assist patient in recognizing the significant progress and improvement she has made since beginning treatment.  Also utilized cognitive behavioral therapy to assist the patient in recognizing she continues to have a tendency to apologize for how she feels and challenged her to " remind herself she has a right to have feelings and also has a right to expect to be happy.  Discussed the concept of self-esteem and encouraged the patient to began to think about how she feels about herself and to make a list of the words that describe what makes her a valuable person.  Continue to discussed the importance of communication in any didactic relationship.  Continue to focus on healthy skills of daily living and behavioral activation.  Provided unconditional positive regard and a safe, supportive environment.    Allowed patient to freely discuss issues without interruption or judgment. Provided safe, confidential environment to facilitate the development of positive therapeutic relationship and encourage open, honest communication. Assisted patient in identifying risk factors which would indicate the need for higher level of care including thoughts to harm self or others and/or self-harming behavior and encouraged patient to contact this office, call 911, or present to the nearest emergency room should any of these events occur. Discussed crisis intervention services and means to access.  Patient adamantly and convincingly denies current suicidal or homicidal ideation or perceptual disturbance.      Assessment    Patient symptoms appear to be exacerbated this time due to significant ongoing marital strain and is likely susceptible to ongoing exacerbation if not mitigated.  Patient's symptomology continues to cause significant impairment in important areas of functioning.  As a result, the patient can be reasonably expected to benefit from ongoing treatment.    Diagnoses and all orders for this visit:    Post traumatic stress disorder (PTSD)    Insomnia due to mental disorder    Persistent depressive disorder with anxious distress, currently moderate    Avoidant behavior    Marital/partner relational problem               Mental Status Exam  Hygiene:  good  Dress:  casual  Attitude:  Cooperative  Motor  Activity:  Appropriate  Speech:  Monotone  Mood:  depressed  Affect:  depressed  Thought Processes:  Linear  Thought Content:  normal  Suicidal Thoughts: Denies  Homicidal Thoughts:  denies  Crisis Safety Plan: yes, to come to the emergency room.  Hallucinations:  denies    Patient's Support Network Includes:  children    Progress toward goal: Not at goal    Functional Status: Severe impairment    Prognosis: Guarded with Ongoing Treatment      Plan         Patient will continue in individual outpatient therapy session at the [facility] in approximately 2 weeks and will continue in pharmacotherapy as scheduled with PO Greer.  Frequency of treatment will continue to be reassessed.  Patient will adhere to medication regimen as prescribed and report any side effects. Patient will contact this office, call 911 or present to the nearest emergency room should suicidal or homicidal ideations occur. Provide Cognitive Behavioral Therapy and Integrative Therapy to improve functioning, maintain stability, and avoid decompensation and the need for higher level of care.          Return in about 3 weeks (around 3/18/2020) for Next scheduled follow up.      This document signed by Hussein Cruz LCSW, [facility] March 4, 2020 6:27 PM

## 2020-03-18 ENCOUNTER — OFFICE VISIT (OUTPATIENT)
Dept: PSYCHIATRY | Facility: CLINIC | Age: 60
End: 2020-03-18

## 2020-03-18 DIAGNOSIS — F51.05 INSOMNIA DUE TO MENTAL DISORDER: ICD-10-CM

## 2020-03-18 DIAGNOSIS — F34.1 PERSISTENT DEPRESSIVE DISORDER WITH ANXIOUS DISTRESS, CURRENTLY MODERATE: ICD-10-CM

## 2020-03-18 DIAGNOSIS — R46.89 AVOIDANT BEHAVIOR: ICD-10-CM

## 2020-03-18 DIAGNOSIS — Z63.0 MARITAL/PARTNER RELATIONAL PROBLEM: ICD-10-CM

## 2020-03-18 DIAGNOSIS — F43.10 POST TRAUMATIC STRESS DISORDER (PTSD): Primary | ICD-10-CM

## 2020-03-18 PROCEDURE — 90837 PSYTX W PT 60 MINUTES: CPT | Performed by: SOCIAL WORKER

## 2020-03-18 SDOH — SOCIAL STABILITY - SOCIAL INSECURITY: PROBLEMS IN RELATIONSHIP WITH SPOUSE OR PARTNER: Z63.0

## 2020-03-18 NOTE — PROGRESS NOTES
"Date of Service: March 18, 2020  Time In: 9:45 AM  Time Out: 10:50 AM      PROGRESS NOTE  Data:  Delphine Mcmillan is a 59 y.o. female who met 1: 1 with the undersigned for a regularly scheduled individual outpatient therapy session at the [facility] for follow-up of posttraumatic stress disorder, depression, and marital strain.       HPI: Patient reports she feels things are somewhat better at this time but states things were \"rough\" the first 2 weeks of this month.  Patient states she confronted her  with the fact she was not happy and demanded he began working on their relationship.  She reports as a result he began doing things she was requesting around the home and validating her feelings and thoughts.  However, she reports she now realizes she continues to struggle with feelings of guilt and shame and states she believes this is what causes her to be a \"people pleaser\".  Patient continues to struggle with depression as evidenced by depressed mood, anhedonia, anergia, periods of hopelessness, negative self image, and severe social isolation.  Patient rates current symptoms of depression at a 7 on a scale 1-10 with 10 being most severe.  Patient also continues to struggle with posttraumatic stress disorder as evidenced by hypervigilance, increased startle response, unwanted thoughts, and periodic traumatic dreams.  Patient rates current symptoms of PTSD at 4 on a scale of 1-10 with 10 being most severe.  Patient reports she is sleeping relatively well with medication.  Patient reports she continues to adhere to medication regimen as prescribed.  Patient adamantly convincingly denies suicidal ideation and vehemently denies any substance use.        Clinical Maneuvering/Intervention:  Assisted patient in processing above session content; acknowledged and normalized patient’s thoughts, feelings, and concerns.  Allow the patient to discuss/vent her feelings and frustrations concerning what appears to be " significant adverse childhood events that she was raised in the home with the mother who had significant psychiatric illness which resulted in the patient in adopting a little roles at a very young age and taking on responsibility for the safety of her and her siblings.  She also reports she would attempt to keep her mother safe and states on multiple occasion she interrupted suicide attempts.  Utilized cognitive behavioral theory to assist the patient in recognizing this likely caused her to adopt the attitude that it was her responsibility to keep everyone happy and to keep everyone safe which followed her into adulthood.  Also utilized motivational reviewing techniques including complex reflections to assist the patient in recognizing she likely has difficulty making herself a priority as this causes her to feel guilty.  Challenged the patient to remind herself she does not have to feel guilty for doing what is right for her and she has a right to do what she needs to do to make herself happy.  Also challenged the patient to remind herself she no longer has to prove she is not her mother.  Encouraged patient to take concrete steps to begin to behave differently and to do something on a daily basis to make herself happy.  Also validated the patient's plan to discussed the fact she no longer wants to babysit HER-2 small grandchildren 5 days weekly and encouraged her to remind herself her daughter and son-in-law have the financial means to pay for childcare.  Provided unconditional positive regard and a safe, supportive environment.    Allowed patient to freely discuss issues without interruption or judgment. Provided safe, confidential environment to facilitate the development of positive therapeutic relationship and encourage open, honest communication. Assisted patient in identifying risk factors which would indicate the need for higher level of care including thoughts to harm self or others and/or self-harming  behavior and encouraged patient to contact this office, call 911, or present to the nearest emergency room should any of these events occur. Discussed crisis intervention services and means to access.  Patient adamantly and convincingly denies current suicidal or homicidal ideation or perceptual disturbance.      Assessment    Patient appears to be doing somewhat better as marital strain has eased.  However, she continues to be susceptible to an external locus of control.  Patient's symptomology continues to cause significant impairment in important areas of functioning.  As a result, the patient can be reasonably expected to benefit from ongoing treatment.    Diagnoses and all orders for this visit:    Post traumatic stress disorder (PTSD)    Persistent depressive disorder with anxious distress, currently moderate    Insomnia due to mental disorder    Avoidant behavior    Marital/partner relational problem               Mental Status Exam  Hygiene:  good  Dress:  casual  Attitude:  Cooperative  Motor Activity:  Appropriate  Speech:  Monotone  Mood:  depressed  Affect:  depressed  Thought Processes:  Linear  Thought Content:  normal  Suicidal Thoughts: Denies  Homicidal Thoughts:  denies  Crisis Safety Plan: yes, to come to the emergency room.  Hallucinations:  denies    Patient's Support Network Includes:  children    Progress toward goal: Not at goal    Functional Status: Severe impairment    Prognosis: Guarded with Ongoing Treatment      Plan         Patient will continue in individual outpatient therapy session at the [facility] in approximately 1 week and will continue in pharmacotherapy as scheduled with PO Greer.  Frequency of treatment will continue to be reassessed.  Patient will adhere to medication regimen as prescribed and report any side effects. Patient will contact this office, call 911 or present to the nearest emergency room should suicidal or homicidal ideations occur. Provide  Cognitive Behavioral Therapy and Integrative Therapy to improve functioning, maintain stability, and avoid decompensation and the need for higher level of care.          Return in about 1 week (around 3/25/2020) for Next scheduled follow up.      This document signed by Hussein Cruz LCSW, [facility] March 18, 2020 11:16

## 2020-04-07 DIAGNOSIS — F34.1 PERSISTENT DEPRESSIVE DISORDER WITH ANXIOUS DISTRESS, CURRENTLY MODERATE: ICD-10-CM

## 2020-04-07 DIAGNOSIS — F43.10 POST TRAUMATIC STRESS DISORDER (PTSD): ICD-10-CM

## 2020-04-07 DIAGNOSIS — R46.89 AVOIDANT BEHAVIOR: ICD-10-CM

## 2020-04-07 DIAGNOSIS — F51.05 INSOMNIA DUE TO MENTAL DISORDER: ICD-10-CM

## 2020-04-07 RX ORDER — QUETIAPINE FUMARATE 200 MG/1
200 TABLET, FILM COATED ORAL NIGHTLY
Qty: 30 TABLET | Refills: 2 | Status: SHIPPED | OUTPATIENT
Start: 2020-04-07 | End: 2020-04-14 | Stop reason: SDUPTHER

## 2020-04-07 RX ORDER — METHYLPHENIDATE HYDROCHLORIDE 20 MG/1
TABLET ORAL
Qty: 90 TABLET | Refills: 0 | OUTPATIENT
Start: 2020-04-07

## 2020-04-07 NOTE — TELEPHONE ENCOUNTER
Reviewed SALLIE and chart. Patient on stimulant, benzodiazepine, and opioid. Last note reported increased anxiety. Should reconsider stimulant in that case. Will hold off on this for now. Refilled Seroquel.

## 2020-04-09 ENCOUNTER — TELEPHONE (OUTPATIENT)
Dept: PSYCHIATRY | Facility: CLINIC | Age: 60
End: 2020-04-09

## 2020-04-09 NOTE — TELEPHONE ENCOUNTER
Patient  called wondering why his wife couldn't get a refill on her medication. I explain to him that the provider she was seeing hasn't been in the office due to the pandemic that's been going because  nu dont have a sitter. That we had to schedule her with another provider for next week that is willing to see the patient and help her. Patient  then yelled at me and said I was being smart .  That her other medications had been sent accept for Methlphenidate that the Doctor is not comfortable refilling until they see her. He then yelled and said that not right and we are doing this on promise to her because we don't have to deal with her at home.  If something doesn't changed to have her refill today I will be down there to raise hell and I know people at that hospital.     So I then transfer the call to Faby .

## 2020-04-09 NOTE — TELEPHONE ENCOUNTER
Patient called stating that she had called in a refill for medication on Monday and was confused as to why that has not been sent in yet. I referred back to her refill request note from Dr. Laws sent to one of the MA's and made her aware of his note. She had an appointment with Thu on 4/15/20 so I got her in with Chyna Christianson on 4/14/20 and walked her through how to download and access [employer] for her video appointment. Patient was not happy with Dr. Laws's decision for her medication, but made her aware that she can discuss this in her visit. Patient was made aware to call back if she needed any assistance with accessing her [employer].

## 2020-04-14 ENCOUNTER — TELEMEDICINE (OUTPATIENT)
Dept: PSYCHIATRY | Facility: CLINIC | Age: 60
End: 2020-04-14

## 2020-04-14 DIAGNOSIS — F51.05 INSOMNIA DUE TO MENTAL DISORDER: ICD-10-CM

## 2020-04-14 DIAGNOSIS — F43.10 POST TRAUMATIC STRESS DISORDER (PTSD): ICD-10-CM

## 2020-04-14 DIAGNOSIS — R46.89 AVOIDANT BEHAVIOR: ICD-10-CM

## 2020-04-14 DIAGNOSIS — Z63.0 MARITAL/PARTNER RELATIONAL PROBLEM: ICD-10-CM

## 2020-04-14 DIAGNOSIS — F34.1 PERSISTENT DEPRESSIVE DISORDER WITH ANXIOUS DISTRESS, CURRENTLY MODERATE: Primary | ICD-10-CM

## 2020-04-14 PROCEDURE — 99214 OFFICE O/P EST MOD 30 MIN: CPT | Performed by: NURSE PRACTITIONER

## 2020-04-14 RX ORDER — PRAZOSIN HYDROCHLORIDE 5 MG/1
10 CAPSULE ORAL NIGHTLY
Qty: 60 CAPSULE | Refills: 2 | Status: SHIPPED | OUTPATIENT
Start: 2020-04-14 | End: 2020-06-15 | Stop reason: SDUPTHER

## 2020-04-14 RX ORDER — QUETIAPINE FUMARATE 200 MG/1
200 TABLET, FILM COATED ORAL NIGHTLY
Qty: 30 TABLET | Refills: 2 | Status: SHIPPED | OUTPATIENT
Start: 2020-04-14 | End: 2020-06-15 | Stop reason: SDUPTHER

## 2020-04-14 RX ORDER — BUPROPION HYDROCHLORIDE 150 MG/1
150 TABLET ORAL EVERY MORNING
Qty: 30 TABLET | Refills: 2 | Status: SHIPPED | OUTPATIENT
Start: 2020-04-14 | End: 2020-06-15 | Stop reason: SDUPTHER

## 2020-04-14 RX ORDER — BUPROPION HYDROCHLORIDE 300 MG/1
300 TABLET ORAL EVERY MORNING
Qty: 30 TABLET | Refills: 2 | Status: SHIPPED | OUTPATIENT
Start: 2020-04-14 | End: 2020-06-15 | Stop reason: SDUPTHER

## 2020-04-14 SDOH — SOCIAL STABILITY - SOCIAL INSECURITY: PROBLEMS IN RELATIONSHIP WITH SPOUSE OR PARTNER: Z63.0

## 2020-04-14 NOTE — PROGRESS NOTES
You have chosen to receive care through a telephone visit. Do you consent to use a telephone visit for your medical care today? YES    This provider is located at [facility] at [address] [place] [address]. The provider identified herself as well as her credentials.   The Patient is at home using his phone because problems with video connection. The patient's condition being diagnosed/treated is appropriate for telemedicine. The patient gave consent to be seen remotely, and when consent is given they understand that the consent allows for patient identifiable information to be sent to a third party as needed.   They may refuse to be seen remotely at any time. The electronic data is encrypted and password protected, and the patient has been advised of the potential risks to privacy not withstanding such measures    Subjective   Delphine Mcmillan is a 59 y.o. female is being interviewed today via Telephone related to [facility] recommendations associated with [place] Pandemic.    Chief Complaint: Depression, anxiety, PTSD      History of Present Illness  She states that there has been some confusion about her medications.  She states that she has been really upset because her ritalin was discontinued and it has caused her to have a lot of chaos in the life.  She states that she is not currently taking any medications because she became so upset when the ritalin was removed.  Discussed the indications for ritalin and that she had never been diagnosed or tested for ADHD, although the patient did report that she had issues with racing thoughts and not being able to concentrate to complete chores.  She was mostly concerned about abruptly coming off the medication and reassured her that she should not expect any adverse withdraw symptoms. Plus she was having significant issues with anxiety, discussed how stimulants can increase anxiety.  Discussed possibly increasing the wellbutrin to help with her  concentration but made her aware that this may cause her anxiety also.   Discussed the importance of the wellbutrin stopping wellbutrin abruptly and patient is agreeable to start back on it.  She states that she feels like she is a nervous wreck, only takes the diazepam if needed.  Made patient aware to avoid combining with her current pain medications especially with her sleep disorder.  She states that she is suppoe to have a sleep study performed but because of the virus it has been cancelled until a later date.  She shares that is sleeping ok and recently had the prazosin increased because she was having intense NM.  She is getting between 4-5 hours per night. Appetite has been ok with no reported weight increase.  Denies any recent health issues.  Denies any other stressors.  Denies any AV hallucinations, denies any SI/HI. Hold diazepam at this time related to current supply, will evaluate should she need any refills to avoid combining with opiates.        The following portions of the patient's history were reviewed and updated as appropriate: allergies, current medications, past family history, past medical history, past social history, past surgical history and problem list.    Review of Systems   Constitutional: Negative for appetite change, chills, diaphoresis, fatigue, fever and unexpected weight change.   HENT: Negative for hearing loss, sore throat, trouble swallowing and voice change.    Eyes: Negative for photophobia and visual disturbance.   Respiratory: Negative for cough, chest tightness and shortness of breath.    Cardiovascular: Negative for chest pain and palpitations.   Gastrointestinal: Negative for abdominal pain, constipation, nausea and vomiting.   Endocrine: Negative for cold intolerance and heat intolerance.   Genitourinary: Negative for dysuria and frequency.   Musculoskeletal: Negative for arthralgias, back pain, joint swelling and neck stiffness.   Skin: Negative for color change and  wound.   Allergic/Immunologic: Negative for environmental allergies and immunocompromised state.   Neurological: Negative for dizziness, tremors, seizures, syncope, weakness, light-headedness and headaches.   Hematological: Negative for adenopathy. Does not bruise/bleed easily.       Objective   Unable to assess  Physical Exam   Constitutional: She appears well-developed and well-nourished. No distress.   Neurological: She is alert. Coordination and gait normal.   Vitals reviewed.    There were no vitals taken for this visit.    Medication List:   Current Outpatient Medications   Medication Sig Dispense Refill   • amoxicillin (AMOXIL) 875 MG tablet      • atorvastatin (LIPITOR) 20 MG tablet      • buPROPion XL (Wellbutrin XL) 150 MG 24 hr tablet Take 1 tablet by mouth Every Morning. 30 tablet 2   • buPROPion XL (WELLBUTRIN XL) 300 MG 24 hr tablet Take 1 tablet by mouth Every Morning. 30 tablet 2   • diazePAM (VALIUM) 2 MG tablet Take 1 tablet by mouth 2 (Two) Times a Day As Needed for Anxiety. 60 tablet 0   • fluticasone (FLONASE) 50 MCG/ACT nasal spray 2 sprays into the nostril(s) as directed by provider Daily.     • HYDROcodone-acetaminophen (NORCO) 7.5-325 MG per tablet      • levothyroxine (SYNTHROID, LEVOTHROID) 100 MCG tablet Daily.     • omeprazole (priLOSEC) 20 MG capsule      • prazosin (MINIPRESS) 5 MG capsule Take 2 capsules by mouth Every Night. 60 capsule 2   • QUEtiapine (SEROquel) 200 MG tablet Take 1 tablet by mouth Every Night. 30 tablet 2     No current facility-administered medications for this visit.        Mental Status Exam:   Hygiene:   unable to assess  Cooperation:  Cooperative  Eye Contact:  unable to assess  Psychomotor Behavior:  unable to assess  Affect:  unable to assess  Hopelessness: Denies  Speech:  Normal  Thought Process:  Linear  Thought Content:  Mood congruent  Suicidal:  None  Homicidal:  None  Hallucinations:  None  Delusion:  None  Memory:  Intact  Orientation:  Person,  Place, Time and Situation  Reliability:  fair  Insight:  Fair  Judgement:  Fair  Impulse Control:  Fair  Physical/Medical Issues:  No     Assessment/Plan   Problems Addressed this Visit     None      Visit Diagnoses     Persistent depressive disorder with anxious distress, currently moderate    -  Primary    Relevant Medications    buPROPion XL (WELLBUTRIN XL) 300 MG 24 hr tablet    QUEtiapine (SEROquel) 200 MG tablet    buPROPion XL (Wellbutrin XL) 150 MG 24 hr tablet    Insomnia due to mental disorder        Relevant Medications    buPROPion XL (WELLBUTRIN XL) 300 MG 24 hr tablet    prazosin (MINIPRESS) 5 MG capsule    QUEtiapine (SEROquel) 200 MG tablet    buPROPion XL (Wellbutrin XL) 150 MG 24 hr tablet    Post traumatic stress disorder (PTSD)        Relevant Medications    buPROPion XL (WELLBUTRIN XL) 300 MG 24 hr tablet    prazosin (MINIPRESS) 5 MG capsule    QUEtiapine (SEROquel) 200 MG tablet    buPROPion XL (Wellbutrin XL) 150 MG 24 hr tablet    Avoidant behavior        Relevant Medications    QUEtiapine (SEROquel) 200 MG tablet    Marital/partner relational problem              Discussed medication options.  Increase bupropion for depression, concentration and focus, seroquel for thoughts and mood, prazosin for NM.   Discontinue ritalin related to no diagnostic reason, hold diazepam at this time- will re-evaluate and not prescribed if patient is also taking opiates.   Reviewed the risks, benefits, and side effects of the medications; patient acknowledged and verbally consented.  Patient is agreeable to call the [facility] with any worsening of symptoms.  Patient is aware to call 911 or go to the nearest ER should begin having SI/HI    Prognosis: Guarded dependent on medication, follow up appointment and treatment plan compliance .     Functionality: Poor.  Symptoms have returned since stopping medications but agreeable to restart.    Return in 9 weeks       This visit has been rescheduled as a phone  visit to comply with patient safety concerns in accordance with CDC recommendations. Total time of discussion was 30 minutes.

## 2020-04-29 ENCOUNTER — TELEMEDICINE (OUTPATIENT)
Dept: PSYCHIATRY | Facility: CLINIC | Age: 60
End: 2020-04-29

## 2020-04-29 DIAGNOSIS — F51.05 INSOMNIA DUE TO MENTAL DISORDER: ICD-10-CM

## 2020-04-29 DIAGNOSIS — Z63.0 MARITAL/PARTNER RELATIONAL PROBLEM: ICD-10-CM

## 2020-04-29 DIAGNOSIS — F34.1 PERSISTENT DEPRESSIVE DISORDER WITH ANXIOUS DISTRESS, CURRENTLY MODERATE: Primary | ICD-10-CM

## 2020-04-29 DIAGNOSIS — R46.89 AVOIDANT BEHAVIOR: ICD-10-CM

## 2020-04-29 DIAGNOSIS — F43.10 POST TRAUMATIC STRESS DISORDER (PTSD): ICD-10-CM

## 2020-04-29 PROCEDURE — 90834 PSYTX W PT 45 MINUTES: CPT | Performed by: SOCIAL WORKER

## 2020-04-29 SDOH — SOCIAL STABILITY - SOCIAL INSECURITY: PROBLEMS IN RELATIONSHIP WITH SPOUSE OR PARTNER: Z63.0

## 2020-04-29 NOTE — PROGRESS NOTES
Date of Service: April 29, 2020  Time In: 10:15 AM  Time Out: 10:57 AM      PROGRESS NOTE  Data:  Delphine Mcmillan is a 59 y.o. female who met 1: 1 with the undersigned for a regularly scheduled individual outpatient therapy session. [place] [address]. The provider identified himself as well as his credentials.   The Patient is at her home using her phone because problems with video connection. The patient's condition being diagnosed/treated is appropriate for telemedicine. The patient gave consent to be seen remotely, and when consent is given they understand that the consent allows for patient identifiable information to be sent to a third party as needed.   They may refuse to be seen remotely at any time. The electronic data is encrypted and password protected, and the patient has been advised of the potential risks to privacy not withstanding such measures.  You have chosen to receive care through a telephone visit. Do you consent to use a telephone visit for your medical care today? Yes  This visit has been rescheduled as a phone visit to comply with patient safety concerns in accordance with CDC recommendations. Total time of discussion was 42 minutes.       HPI: Patient reports approximately 3 weeks ago her medications were changed by a new provider and states this made her feel very uneasy.  In fact, patient states she feels as though she is being accused of being an addict and that she will doing something overall.  Patient also reports she feels as though the reasoning behind this change was not adequately explained to her.  A result, she states she abruptly stopped all medications and states she did return to taking prazosin and Seroquel due to significant insomnia and a severe increase in traumatic dreams.  Patient continues to struggle with depression as evidenced by depressed mood, anhedonia, anergia, periods of hopelessness, negative self image,  "and severe social isolation.  Patient rates current symptoms of depression at an 8 on a scale 1-10 with 10 being most severe.  Patient also continues to struggle with posttraumatic stress disorder as evidenced by hypervigilance, increased startle response, unwanted thoughts, and periodic traumatic dreams.  Patient rates current symptoms of PTSD at 4 on a scale of 1-10 with 10 being most severe.  Patient also reports increased difficulty with maintaining attention and states she feels \"scatterbrained\" after Ritalin was discontinued.  Patient reports she is sleeping relatively well with medication.  Patient reports she continues to adhere to medication regimen as prescribed.  Patient adamantly convincingly denies suicidal ideation and vehemently denies any substance use.        Clinical Maneuvering/Intervention:  Assisted patient in processing above session content; acknowledged and normalized patient’s thoughts, feelings, and concerns.  This session was primarily focused on addressing the patient's feelings concerning her treatment and validating her feelings and concerns.  Utilized lower extremity techniques including complex reflections to assist patient in recognizing and acknowledging steps taken had nothing to do with what she has done or has not done in another way reflects the fact that she has characteristics of an addict.  Utilized cognitive behavioral therapy to attempt to restructure the patient's thoughts concerning this issue and challenged her to remind herself she refuses to take her medication she will be allowing her anger to cause her to engage in self also strongly urged the patient to reconsider restarting all of her medications and keeping her future appointments.  Further encouraged patient to schedule follow-up with the undersigned and to hopefully be able to be in the office in person.  Continue to utilize cognitive behavioral therapy to assist patient developing appropriate coping mechanisms " to decrease the severity and frequency of symptoms.  Provided unconditional positive regard and a safe, supportive environment.    Allowed patient to freely discuss issues without interruption or judgment. Provided safe, confidential environment to facilitate the development of positive therapeutic relationship and encourage open, honest communication. Assisted patient in identifying risk factors which would indicate the need for higher level of care including thoughts to harm self or others and/or self-harming behavior and encouraged patient to contact this office, call 911, or present to the nearest emergency room should any of these events occur. Discussed crisis intervention services and means to access.  Patient adamantly and convincingly denies current suicidal or homicidal ideation or perceptual disturbance.      Assessment    Patient appears to be doing somewhat better as marital strain has eased.  However, she continues to be susceptible to an external locus of control.  Patient's symptomology continues to cause significant impairment in important areas of functioning.  As a result, the patient can be reasonably expected to benefit from ongoing treatment.    Diagnoses and all orders for this visit:    Persistent depressive disorder with anxious distress, currently moderate    Insomnia due to mental disorder    Post traumatic stress disorder (PTSD)    Avoidant behavior    Marital/partner relational problem               Mental Status Exam  Hygiene: Not applicable due to telephone session  Dress:  Not applicable due to telephone session  Attitude:  Cooperative  Motor Activity:  Not applicable due to telephone session  Speech:  Monotone  Mood:  depressed/irritable  Affect: Not applicable due to telephone session  Thought Processes:  Linear  Thought Content:  normal  Suicidal Thoughts: Denies  Homicidal Thoughts:  denies  Crisis Safety Plan: yes, to come to the emergency room.  Hallucinations:   denies    Patient's Support Network Includes:  children    Progress toward goal: Not at goal    Functional Status: Severe impairment    Prognosis: Guarded with Ongoing Treatment      Plan         Patient appears to be struggling with increased symptoms as she recently arbitrarily discontinued her medications due to becoming angry and she felt she was not treated fairly by many providers.  Patient was agreeable to restarting her medications and continuing to keep her appointment to continue to work on her issues.  Patient will adhere to medication regimen as prescribed and report any side effects. Patient will contact this office, call 911 or present to the nearest emergency room should suicidal or homicidal ideations occur. Provide Cognitive Behavioral Therapy and Integrative Therapy to improve functioning, maintain stability, and avoid decompensation and the need for higher level of care.          Return in about 3 weeks (around 5/20/2020) for Next scheduled follow up.      This document signed by Hussein Cruz LCSW, [facility] April 29, 2020 13:23

## 2020-06-01 DIAGNOSIS — F51.05 INSOMNIA DUE TO MENTAL DISORDER: ICD-10-CM

## 2020-06-01 DIAGNOSIS — F43.10 POST TRAUMATIC STRESS DISORDER (PTSD): ICD-10-CM

## 2020-06-01 RX ORDER — PRAZOSIN HYDROCHLORIDE 5 MG/1
10 CAPSULE ORAL NIGHTLY
Qty: 60 CAPSULE | Refills: 2 | OUTPATIENT
Start: 2020-06-01

## 2020-06-15 ENCOUNTER — TELEMEDICINE (OUTPATIENT)
Dept: PSYCHIATRY | Facility: CLINIC | Age: 60
End: 2020-06-15

## 2020-06-15 DIAGNOSIS — R46.89 AVOIDANT BEHAVIOR: ICD-10-CM

## 2020-06-15 DIAGNOSIS — F51.05 INSOMNIA DUE TO MENTAL DISORDER: ICD-10-CM

## 2020-06-15 DIAGNOSIS — F43.10 POST TRAUMATIC STRESS DISORDER (PTSD): Primary | ICD-10-CM

## 2020-06-15 DIAGNOSIS — F34.1 PERSISTENT DEPRESSIVE DISORDER WITH ANXIOUS DISTRESS, CURRENTLY MODERATE: ICD-10-CM

## 2020-06-15 DIAGNOSIS — F40.01 PANIC DISORDER WITH AGORAPHOBIA: ICD-10-CM

## 2020-06-15 PROCEDURE — 99214 OFFICE O/P EST MOD 30 MIN: CPT | Performed by: NURSE PRACTITIONER

## 2020-06-15 RX ORDER — QUETIAPINE FUMARATE 200 MG/1
200 TABLET, FILM COATED ORAL NIGHTLY
Qty: 30 TABLET | Refills: 2 | Status: SHIPPED | OUTPATIENT
Start: 2020-06-15 | End: 2020-09-29 | Stop reason: SDUPTHER

## 2020-06-15 RX ORDER — PRAZOSIN HYDROCHLORIDE 5 MG/1
10 CAPSULE ORAL NIGHTLY
Qty: 60 CAPSULE | Refills: 2 | Status: SHIPPED | OUTPATIENT
Start: 2020-06-15 | End: 2020-09-29 | Stop reason: SDUPTHER

## 2020-06-15 RX ORDER — DIAZEPAM 2 MG/1
2 TABLET ORAL 2 TIMES DAILY PRN
Qty: 60 TABLET | Refills: 0
Start: 2020-06-15 | End: 2020-08-03 | Stop reason: SDUPTHER

## 2020-06-15 RX ORDER — BUPROPION HYDROCHLORIDE 150 MG/1
150 TABLET ORAL EVERY MORNING
Qty: 30 TABLET | Refills: 2 | Status: SHIPPED | OUTPATIENT
Start: 2020-06-15 | End: 2020-09-29 | Stop reason: SDUPTHER

## 2020-06-15 RX ORDER — BUPROPION HYDROCHLORIDE 300 MG/1
300 TABLET ORAL EVERY MORNING
Qty: 30 TABLET | Refills: 2 | Status: SHIPPED | OUTPATIENT
Start: 2020-06-15 | End: 2020-09-29 | Stop reason: SDUPTHER

## 2020-06-15 NOTE — PROGRESS NOTES
Patient is being seen via [employer]    This provider is located at [facility] at [address] [place] [address], [place] [address]. The provider identified herself as well as her credentials.   The Patient is at home using his phone with video connection. The patient's condition being diagnosed/treated is appropriate for telemedicine. The patient gave consent to be seen remotely, and when consent is given they understand that the consent allows for patient identifiable information to be sent to a third party as needed.   They may refuse to be seen remotely at any time. The electronic data is encrypted and password protected, and the patient has been advised of the potential risks to privacy not withstanding such measures    Subjective   Delphine Mcmillan is a 59 y.o. female is being interviewed today via [employer] related to CDC recommendations associated with Corona Pandemic.    Chief Complaint: Depression, anxiety, PTSD      History of Present Illness She states that she is doing ok, but feels like she may have bronchitis and will be calling the PCP after visit.  She states that she feels like her NM have increased recently, unable to identify new stressors other the son and wife having their first child.  He has already asked her to come and visit for awhile with the birth of his baby, but patient rarely leaves her home.  She rates her anxiety about 7/10, depression about 7/10, and PTSD about 6/10 with 10 being the worse.   She states that she is sometimes hit hard by those memories and with her NM.  She states that she is currently getting about 5-6 hours per night, she is waking with NM but is able to go back to sleep, she feels like the prazosin has been helpful.  She states that her appetite has been increased, however she states that she can't leave her home to walk, but recommended that she try to find exercises at home.  She stopped taking her pain medication in April, she is now just taking her  ibuprofen and tylenol- she states that she is hurting generally everywhere.  Denies any AV hallucinations, denies any SI/HI.      The following portions of the patient's history were reviewed and updated as appropriate: allergies, current medications, past family history, past medical history, past social history, past surgical history and problem list.    Review of Systems   Constitutional: Negative for appetite change, chills, diaphoresis, fatigue, fever and unexpected weight change.   HENT: Negative for hearing loss, sore throat, trouble swallowing and voice change.    Eyes: Negative for photophobia and visual disturbance.   Respiratory: Negative for cough, chest tightness and shortness of breath.    Cardiovascular: Negative for chest pain and palpitations.   Gastrointestinal: Negative for abdominal pain, constipation, nausea and vomiting.   Endocrine: Negative for cold intolerance and heat intolerance.   Genitourinary: Negative for dysuria and frequency.   Musculoskeletal: Positive for arthralgias and myalgias. Negative for back pain, joint swelling and neck stiffness.   Skin: Negative for color change and wound.   Allergic/Immunologic: Negative for environmental allergies and immunocompromised state.   Neurological: Negative for dizziness, tremors, seizures, syncope, weakness, light-headedness and headaches.   Hematological: Negative for adenopathy. Does not bruise/bleed easily.       Objective    Physical Exam   Constitutional: She appears well-developed and well-nourished. No distress.   Neurological: She is alert. Coordination and gait normal.   Vitals reviewed.    There were no vitals taken for this visit.    Medication List:   Current Outpatient Medications   Medication Sig Dispense Refill   • amoxicillin (AMOXIL) 875 MG tablet      • atorvastatin (LIPITOR) 20 MG tablet      • buPROPion XL (Wellbutrin XL) 150 MG 24 hr tablet Take 1 tablet by mouth Every Morning. 30 tablet 2   • buPROPion XL (WELLBUTRIN XL)  300 MG 24 hr tablet Take 1 tablet by mouth Every Morning. 30 tablet 2   • diazePAM (VALIUM) 2 MG tablet Take 1 tablet by mouth 2 (Two) Times a Day As Needed for Anxiety. 60 tablet 0   • fluticasone (FLONASE) 50 MCG/ACT nasal spray 2 sprays into the nostril(s) as directed by provider Daily.     • levothyroxine (SYNTHROID, LEVOTHROID) 100 MCG tablet Daily.     • omeprazole (priLOSEC) 20 MG capsule      • prazosin (MINIPRESS) 5 MG capsule Take 2 capsules by mouth Every Night. 60 capsule 2   • QUEtiapine (SEROquel) 200 MG tablet Take 1 tablet by mouth Every Night. 30 tablet 2     No current facility-administered medications for this visit.        Mental Status Exam:   Hygiene:   good  Cooperation:  Cooperative  Eye Contact:  Good  Psychomotor Behavior:  Appropriate  Affect:  Blunted  Hopelessness: Denies  Speech:  Normal  Thought Process:  Linear  Thought Content:  Mood congruent  Suicidal:  None  Homicidal:  None  Hallucinations:  None  Delusion:  None  Memory:  Intact  Orientation:  Person, Place, Time and Situation  Reliability:  fair  Insight:  Fair  Judgement:  Fair  Impulse Control:  Fair  Physical/Medical Issues:  No     Assessment/Plan   Problems Addressed this Visit     None      Visit Diagnoses     Post traumatic stress disorder (PTSD)    -  Primary    Relevant Medications    buPROPion XL (Wellbutrin XL) 150 MG 24 hr tablet    buPROPion XL (WELLBUTRIN XL) 300 MG 24 hr tablet    diazePAM (VALIUM) 2 MG tablet    prazosin (MINIPRESS) 5 MG capsule    QUEtiapine (SEROquel) 200 MG tablet    Insomnia due to mental disorder        Relevant Medications    buPROPion XL (Wellbutrin XL) 150 MG 24 hr tablet    buPROPion XL (WELLBUTRIN XL) 300 MG 24 hr tablet    diazePAM (VALIUM) 2 MG tablet    prazosin (MINIPRESS) 5 MG capsule    QUEtiapine (SEROquel) 200 MG tablet    Persistent depressive disorder with anxious distress, currently moderate        Relevant Medications    buPROPion XL (Wellbutrin XL) 150 MG 24 hr tablet     buPROPion XL (WELLBUTRIN XL) 300 MG 24 hr tablet    diazePAM (VALIUM) 2 MG tablet    QUEtiapine (SEROquel) 200 MG tablet    Panic disorder with agoraphobia        Relevant Medications    buPROPion XL (Wellbutrin XL) 150 MG 24 hr tablet    buPROPion XL (WELLBUTRIN XL) 300 MG 24 hr tablet    diazePAM (VALIUM) 2 MG tablet    QUEtiapine (SEROquel) 200 MG tablet    Avoidant behavior        Relevant Medications    QUEtiapine (SEROquel) 200 MG tablet          Discussed medication options. Continue bupropion for depression, concentration and focus, seroquel for thoughts and mood, prazosin for NM.   Hold diazepam at this time- will re-evaluate and not prescribed if patient is also taking opiates.   Reviewed the risks, benefits, and side effects of the medications; patient acknowledged and verbally consented.  Patient is agreeable to call the [facility] with any worsening of symptoms.  Patient is aware to call 911 or go to the nearest ER should begin having SI/HI    Prognosis: Guarded dependent on medication, follow up appointment and treatment plan compliance .     Functionality: Poor.  Symptoms have returned since stopping medications but agreeable to restart.    Return in 12 weeks

## 2020-08-03 DIAGNOSIS — F43.10 POST TRAUMATIC STRESS DISORDER (PTSD): ICD-10-CM

## 2020-08-03 RX ORDER — DIAZEPAM 2 MG/1
2 TABLET ORAL 2 TIMES DAILY PRN
Qty: 60 TABLET | Refills: 0
Start: 2020-08-03 | End: 2020-09-29

## 2020-09-29 ENCOUNTER — TELEMEDICINE (OUTPATIENT)
Dept: PSYCHIATRY | Facility: CLINIC | Age: 60
End: 2020-09-29

## 2020-09-29 DIAGNOSIS — R46.89 AVOIDANT BEHAVIOR: ICD-10-CM

## 2020-09-29 DIAGNOSIS — F34.1 PERSISTENT DEPRESSIVE DISORDER WITH ANXIOUS DISTRESS, CURRENTLY MODERATE: ICD-10-CM

## 2020-09-29 DIAGNOSIS — F40.01 PANIC DISORDER WITH AGORAPHOBIA: ICD-10-CM

## 2020-09-29 DIAGNOSIS — F51.05 INSOMNIA DUE TO MENTAL DISORDER: ICD-10-CM

## 2020-09-29 DIAGNOSIS — F43.10 POST TRAUMATIC STRESS DISORDER (PTSD): Primary | ICD-10-CM

## 2020-09-29 PROCEDURE — 99214 OFFICE O/P EST MOD 30 MIN: CPT | Performed by: NURSE PRACTITIONER

## 2020-09-29 RX ORDER — QUETIAPINE FUMARATE 200 MG/1
200 TABLET, FILM COATED ORAL NIGHTLY
Qty: 30 TABLET | Refills: 2 | Status: SHIPPED | OUTPATIENT
Start: 2020-09-29 | End: 2020-12-10 | Stop reason: SDUPTHER

## 2020-09-29 RX ORDER — BUPROPION HYDROCHLORIDE 300 MG/1
300 TABLET ORAL EVERY MORNING
Qty: 30 TABLET | Refills: 2 | Status: SHIPPED | OUTPATIENT
Start: 2020-09-29 | End: 2020-12-10 | Stop reason: SDUPTHER

## 2020-09-29 RX ORDER — BUPROPION HYDROCHLORIDE 150 MG/1
150 TABLET ORAL EVERY MORNING
Qty: 30 TABLET | Refills: 2 | Status: SHIPPED | OUTPATIENT
Start: 2020-09-29 | End: 2020-12-10 | Stop reason: SDUPTHER

## 2020-09-29 RX ORDER — PRAZOSIN HYDROCHLORIDE 5 MG/1
10 CAPSULE ORAL NIGHTLY
Qty: 60 CAPSULE | Refills: 2 | Status: SHIPPED | OUTPATIENT
Start: 2020-09-29 | End: 2020-12-10 | Stop reason: SDUPTHER

## 2020-09-29 NOTE — PROGRESS NOTES
"Patient is being seen via [employer]    This provider is located at [facility] at [address] [place] [address]. The provider identified herself as well as her credentials.   The Patient is at home using his phone with video connection. The patient's condition being diagnosed/treated is appropriate for telemedicine. The patient gave consent to be seen remotely, and when consent is given they understand that the consent allows for patient identifiable information to be sent to a third party as needed.   They may refuse to be seen remotely at any time. The electronic data is encrypted and password protected, and the patient has been advised of the potential risks to privacy not withstanding such measures    Subjective   Delphine Mcmillan is a 60 y.o. female is being interviewed today via [employer] related to CDC recommendations associated with Corona Pandemic.    Chief Complaint: Depression, anxiety, PTSD    History of Present Illness She states that she is doing good today, she states that she is doing well with her medications.  She states that she is having a little bit of weight gain, she understands that it is associated with the seroquel but other then that she is fine.  She states that she doesn't feel comfortable getting out of her house, she states that she will occasionally get out of the house with her  but not often.  She states that she has moments of depression, she states that when you don't go do anything you get into your head and it gets \"you down\", she rates it about 6/10 with 10 being the worse.  She rates her anxiety about 6/10 with 10 being the worse- she always worries about her kids and grand kids- always been protective of them.  She states that her PTSD is still causing her NM, she wakes up all night long and she has come to expect it- she has a habit of looking at the clock.  She is getting about 5 hours of sleep per night with occasional NM and \"dreams that " "bother me\".  Appetite has been good, she states that she feels gained about 20 pound since the pandemic.  She states that she has not been sick, but states that she still has a lot of pain in her legs and hips but she tries to \"bare with it\".  She states that she feels like she stresses out about \"everything\".  Denies any AV hallucinations, denies any SI/HI.      The following portions of the patient's history were reviewed and updated as appropriate: allergies, current medications, past family history, past medical history, past social history, past surgical history and problem list.    Review of Systems   Constitutional: Negative for appetite change, chills, diaphoresis, fatigue, fever and unexpected weight change.   HENT: Negative for hearing loss, sore throat, trouble swallowing and voice change.    Eyes: Negative for photophobia and visual disturbance.   Respiratory: Negative for cough, chest tightness and shortness of breath.    Cardiovascular: Negative for chest pain and palpitations.   Gastrointestinal: Negative for abdominal pain, constipation, nausea and vomiting.   Endocrine: Negative for cold intolerance and heat intolerance.   Genitourinary: Negative for dysuria and frequency.   Musculoskeletal: Positive for arthralgias and myalgias. Negative for back pain, joint swelling and neck stiffness.   Skin: Negative for color change and wound.   Allergic/Immunologic: Negative for environmental allergies and immunocompromised state.   Neurological: Negative for dizziness, tremors, seizures, syncope, weakness, light-headedness and headaches.   Hematological: Negative for adenopathy. Does not bruise/bleed easily.     Objective    Physical Exam   Constitutional: She appears well-developed. No distress.   Neurological: She is alert. Coordination and gait normal.   Vitals reviewed.    There were no vitals taken for this visit.    Medication List:   Current Outpatient Medications   Medication Sig Dispense Refill   • " atorvastatin (LIPITOR) 20 MG tablet      • buPROPion XL (Wellbutrin XL) 150 MG 24 hr tablet Take 1 tablet by mouth Every Morning. 30 tablet 2   • buPROPion XL (WELLBUTRIN XL) 300 MG 24 hr tablet Take 1 tablet by mouth Every Morning. 30 tablet 2   • fluticasone (FLONASE) 50 MCG/ACT nasal spray 2 sprays into the nostril(s) as directed by provider Daily.     • levothyroxine (SYNTHROID, LEVOTHROID) 100 MCG tablet Daily.     • omeprazole (priLOSEC) 20 MG capsule      • prazosin (MINIPRESS) 5 MG capsule Take 2 capsules by mouth Every Night. 60 capsule 2   • QUEtiapine (SEROquel) 200 MG tablet Take 1 tablet by mouth Every Night. 30 tablet 2     No current facility-administered medications for this visit.        Mental Status Exam:   Hygiene:   good  Cooperation:  Cooperative  Eye Contact:  Good  Psychomotor Behavior:  Appropriate  Affect:  Blunted  Hopelessness: Denies  Speech:  Normal  Thought Process:  Linear  Thought Content:  Mood congruent  Suicidal:  None  Homicidal:  None  Hallucinations:  None  Delusion:  None  Memory:  Intact  Orientation:  Person, Place, Time and Situation  Reliability:  fair  Insight:  Fair  Judgement:  Fair  Impulse Control:  Fair  Physical/Medical Issues:  No     Assessment/Plan   Problems Addressed this Visit     None      Visit Diagnoses     Post traumatic stress disorder (PTSD)    -  Primary    Relevant Medications    buPROPion XL (Wellbutrin XL) 150 MG 24 hr tablet    buPROPion XL (WELLBUTRIN XL) 300 MG 24 hr tablet    prazosin (MINIPRESS) 5 MG capsule    QUEtiapine (SEROquel) 200 MG tablet    Insomnia due to mental disorder        Relevant Medications    buPROPion XL (Wellbutrin XL) 150 MG 24 hr tablet    buPROPion XL (WELLBUTRIN XL) 300 MG 24 hr tablet    prazosin (MINIPRESS) 5 MG capsule    QUEtiapine (SEROquel) 200 MG tablet    Persistent depressive disorder with anxious distress, currently moderate        Relevant Medications    buPROPion XL (Wellbutrin XL) 150 MG 24 hr tablet     buPROPion XL (WELLBUTRIN XL) 300 MG 24 hr tablet    QUEtiapine (SEROquel) 200 MG tablet    Panic disorder with agoraphobia        Relevant Medications    buPROPion XL (Wellbutrin XL) 150 MG 24 hr tablet    buPROPion XL (WELLBUTRIN XL) 300 MG 24 hr tablet    QUEtiapine (SEROquel) 200 MG tablet    Avoidant behavior        Relevant Medications    QUEtiapine (SEROquel) 200 MG tablet      Diagnoses       Codes Comments    Post traumatic stress disorder (PTSD)    -  Primary ICD-10-CM: F43.10  ICD-9-CM: 309.81     Insomnia due to mental disorder     ICD-10-CM: F51.05  ICD-9-CM: 300.9, 327.02     Persistent depressive disorder with anxious distress, currently moderate     ICD-10-CM: F34.1  ICD-9-CM: 300.4     Panic disorder with agoraphobia     ICD-10-CM: F40.01  ICD-9-CM: 300.21     Avoidant behavior     ICD-10-CM: R46.89  ICD-9-CM: 312.89           Discussed medication options. Continue bupropion for depression, concentration and focus, seroquel for thoughts and mood, prazosin for NM.   Reviewed the risks, benefits, and side effects of the medications; patient acknowledged and verbally consented.  Patient is agreeable to call the [facility] with any worsening of symptoms.  Patient is aware to call 911 or go to the nearest ER should begin having SI/HI    Prognosis: Guarded dependent on medication, follow up appointment and treatment plan compliance .     Functionality: Poor.  Symptoms have returned since stopping medications but agreeable to restart.    Return in 12 weeks

## 2020-12-10 ENCOUNTER — TELEMEDICINE (OUTPATIENT)
Dept: PSYCHIATRY | Facility: CLINIC | Age: 60
End: 2020-12-10

## 2020-12-10 DIAGNOSIS — F34.1 PERSISTENT DEPRESSIVE DISORDER WITH ANXIOUS DISTRESS, CURRENTLY MODERATE: ICD-10-CM

## 2020-12-10 DIAGNOSIS — F51.05 INSOMNIA DUE TO MENTAL CONDITION: ICD-10-CM

## 2020-12-10 DIAGNOSIS — F43.10 POST TRAUMATIC STRESS DISORDER (PTSD): Primary | ICD-10-CM

## 2020-12-10 DIAGNOSIS — R46.89 AVOIDANT BEHAVIOR: ICD-10-CM

## 2020-12-10 DIAGNOSIS — F40.01 PANIC DISORDER WITH AGORAPHOBIA: ICD-10-CM

## 2020-12-10 DIAGNOSIS — F51.05 INSOMNIA DUE TO MENTAL DISORDER: ICD-10-CM

## 2020-12-10 PROCEDURE — 99214 OFFICE O/P EST MOD 30 MIN: CPT | Performed by: NURSE PRACTITIONER

## 2020-12-10 RX ORDER — BUPROPION HYDROCHLORIDE 150 MG/1
150 TABLET ORAL EVERY MORNING
Qty: 30 TABLET | Refills: 2 | Status: SHIPPED | OUTPATIENT
Start: 2020-12-10 | End: 2021-03-04 | Stop reason: SDUPTHER

## 2020-12-10 RX ORDER — BUPROPION HYDROCHLORIDE 300 MG/1
300 TABLET ORAL EVERY MORNING
Qty: 30 TABLET | Refills: 2 | Status: SHIPPED | OUTPATIENT
Start: 2020-12-10 | End: 2021-03-04 | Stop reason: SDUPTHER

## 2020-12-10 RX ORDER — QUETIAPINE FUMARATE 200 MG/1
200 TABLET, FILM COATED ORAL NIGHTLY
Qty: 30 TABLET | Refills: 2 | Status: SHIPPED | OUTPATIENT
Start: 2020-12-10 | End: 2021-03-04 | Stop reason: SDUPTHER

## 2020-12-10 RX ORDER — PRAZOSIN HYDROCHLORIDE 5 MG/1
10 CAPSULE ORAL NIGHTLY
Qty: 60 CAPSULE | Refills: 2 | Status: SHIPPED | OUTPATIENT
Start: 2020-12-10 | End: 2021-01-29 | Stop reason: SDUPTHER

## 2020-12-10 NOTE — PROGRESS NOTES
"Patient is being seen via [employer]    This provider is located at [facility] at [address] [place] [address]. The provider identified herself as well as her credentials.   The Patient is at home using his phone with video connection. The patient's condition being diagnosed/treated is appropriate for telemedicine. The patient gave consent to be seen remotely, and when consent is given they understand that the consent allows for patient identifiable information to be sent to a third party as needed.   They may refuse to be seen remotely at any time. The electronic data is encrypted and password protected, and the patient has been advised of the potential risks to privacy not withstanding such measures    Subjective   Delphine Mcmillan is a 60 y.o. female is being interviewed today via [employer] related to CDC recommendations associated with Corona Pandemic.    Chief Complaint: Depression, anxiety, PTSD    History of Present Illness  She states that she is doing \"pretty good\".  She states that she has been taking her medications as prescribed with SE or weight gain.  She states that she feels like they are being helpful and the seroquel is helping with her sleep. She rates her anxiety about 5/10, and depression about 3/10 with 10 being the worse.  She states that she don't want to leave her house and if she thinks that she has to go somewhere it will cause her to think about what it will be like, she anticipates it happening without any cause.  She states that her  states that she looks for things to worry about.  She rates her PTSD about 6/10 with 10 being the worse, she states that she is still having NM where she will wake up startled which happens at least 3 times per night- she feels like the prazosin has been helpful.  She states that she hasn't has any recent panic attacks.  She states that she is sleeping ok, she uses progressive relaxation to assist with her falling asleep, " "she wakes up throughout the night, she states that she is getting about 5 hours per night.  She states that she would like to take a nap but her mind races too much.  She states that her appetite has been \"too good\", and she states that she feels like she has gained weight.  She states that she believes that she has a pulled muscle, and recently been diagnosed with a torn ligament in her right knee and she was given steroids which made her a little more \"hateful\".  She states that she has been stressed out about the Holidays.  She is not actively involved in therapy at this time but made aware that her therapist is available via telemedicine.  Denies any AV hallucinations, denies any SI/HI.      The following portions of the patient's history were reviewed and updated as appropriate: allergies, current medications, past family history, past medical history, past social history, past surgical history and problem list.    Review of Systems   Constitutional: Negative for appetite change, chills, diaphoresis, fatigue, fever and unexpected weight change.   HENT: Negative for hearing loss, sore throat, trouble swallowing and voice change.    Eyes: Negative for photophobia and visual disturbance.   Respiratory: Negative for cough, chest tightness and shortness of breath.    Cardiovascular: Negative for chest pain and palpitations.   Gastrointestinal: Negative for abdominal pain, constipation, nausea and vomiting.   Endocrine: Negative for cold intolerance and heat intolerance.   Genitourinary: Negative for dysuria and frequency.   Musculoskeletal: Positive for arthralgias and myalgias. Negative for back pain, joint swelling and neck stiffness.   Skin: Negative for color change and wound.   Allergic/Immunologic: Negative for environmental allergies and immunocompromised state.   Neurological: Negative for dizziness, tremors, seizures, syncope, weakness, light-headedness and headaches.   Hematological: Negative for " adenopathy. Does not bruise/bleed easily.     Objective    Physical Exam   Constitutional: She appears well-developed. No distress.   Neurological: She is alert. Coordination and gait normal.   Vitals reviewed.    There were no vitals taken for this visit.    Medication List:   Current Outpatient Medications   Medication Sig Dispense Refill   • atorvastatin (LIPITOR) 20 MG tablet      • buPROPion XL (Wellbutrin XL) 150 MG 24 hr tablet Take 1 tablet by mouth Every Morning. 30 tablet 2   • buPROPion XL (WELLBUTRIN XL) 300 MG 24 hr tablet Take 1 tablet by mouth Every Morning. 30 tablet 2   • fluticasone (FLONASE) 50 MCG/ACT nasal spray 2 sprays into the nostril(s) as directed by provider Daily.     • levothyroxine (SYNTHROID, LEVOTHROID) 100 MCG tablet Daily.     • omeprazole (priLOSEC) 20 MG capsule      • prazosin (MINIPRESS) 5 MG capsule Take 2 capsules by mouth Every Night. 60 capsule 2   • QUEtiapine (SEROquel) 200 MG tablet Take 1 tablet by mouth Every Night. 30 tablet 2     No current facility-administered medications for this visit.        Mental Status Exam:   Hygiene:   good  Cooperation:  Cooperative  Eye Contact:  Good  Psychomotor Behavior:  Appropriate  Affect:  Blunted  Hopelessness: Denies  Speech:  Normal  Thought Process:  Linear  Thought Content:  Mood congruent  Suicidal:  None  Homicidal:  None  Hallucinations:  None  Delusion:  None  Memory:  Intact  Orientation:  Person, Place, Time and Situation  Reliability:  fair  Insight:  Fair  Judgement:  Fair  Impulse Control:  Fair  Physical/Medical Issues:  No     Assessment/Plan   Problems Addressed this Visit     None      Visit Diagnoses     Post traumatic stress disorder (PTSD)    -  Primary    Relevant Medications    buPROPion XL (Wellbutrin XL) 150 MG 24 hr tablet    buPROPion XL (WELLBUTRIN XL) 300 MG 24 hr tablet    prazosin (MINIPRESS) 5 MG capsule    QUEtiapine (SEROquel) 200 MG tablet    Insomnia due to mental disorder        Relevant  Medications    buPROPion XL (Wellbutrin XL) 150 MG 24 hr tablet    buPROPion XL (WELLBUTRIN XL) 300 MG 24 hr tablet    prazosin (MINIPRESS) 5 MG capsule    QUEtiapine (SEROquel) 200 MG tablet    Persistent depressive disorder with anxious distress, currently moderate        Relevant Medications    buPROPion XL (Wellbutrin XL) 150 MG 24 hr tablet    buPROPion XL (WELLBUTRIN XL) 300 MG 24 hr tablet    QUEtiapine (SEROquel) 200 MG tablet    Panic disorder with agoraphobia        Relevant Medications    buPROPion XL (Wellbutrin XL) 150 MG 24 hr tablet    buPROPion XL (WELLBUTRIN XL) 300 MG 24 hr tablet    QUEtiapine (SEROquel) 200 MG tablet    Avoidant behavior        Relevant Medications    QUEtiapine (SEROquel) 200 MG tablet    Insomnia due to mental condition        Relevant Medications    buPROPion XL (Wellbutrin XL) 150 MG 24 hr tablet    buPROPion XL (WELLBUTRIN XL) 300 MG 24 hr tablet    QUEtiapine (SEROquel) 200 MG tablet      Diagnoses       Codes Comments    Post traumatic stress disorder (PTSD)    -  Primary ICD-10-CM: F43.10  ICD-9-CM: 309.81     Insomnia due to mental disorder     ICD-10-CM: F51.05  ICD-9-CM: 300.9, 327.02     Persistent depressive disorder with anxious distress, currently moderate     ICD-10-CM: F34.1  ICD-9-CM: 300.4     Panic disorder with agoraphobia     ICD-10-CM: F40.01  ICD-9-CM: 300.21     Avoidant behavior     ICD-10-CM: R46.89  ICD-9-CM: V40.39     Insomnia due to mental condition     ICD-10-CM: F51.05  ICD-9-CM: 300.9, 327.02           Discussed medication options. Continue bupropion for depression, concentration and focus, seroquel for thoughts and mood, prazosin for NM. Reviewed the risks, benefits, and side effects of the medications; patient acknowledged and verbally consented.  Patient is agreeable to call the [facility] with any worsening of symptoms.  Patient is aware to call 911 or go to the nearest ER should begin having SI/HI    Prognosis: Guarded dependent on  medication, follow up appointment and treatment plan compliance .     Functionality: Poor.  Symptoms have returned since stopping medications but agreeable to restart.    Return in 12 weeks

## 2021-01-29 DIAGNOSIS — F43.10 POST TRAUMATIC STRESS DISORDER (PTSD): ICD-10-CM

## 2021-01-29 DIAGNOSIS — F51.05 INSOMNIA DUE TO MENTAL DISORDER: ICD-10-CM

## 2021-01-29 RX ORDER — PRAZOSIN HYDROCHLORIDE 5 MG/1
10 CAPSULE ORAL NIGHTLY
Qty: 60 CAPSULE | Refills: 2 | OUTPATIENT
Start: 2021-01-29

## 2021-01-29 RX ORDER — PRAZOSIN HYDROCHLORIDE 5 MG/1
10 CAPSULE ORAL NIGHTLY
Qty: 60 CAPSULE | Refills: 2 | Status: SHIPPED | OUTPATIENT
Start: 2021-01-29 | End: 2021-03-04 | Stop reason: SDUPTHER

## 2021-02-22 DIAGNOSIS — Z23 IMMUNIZATION DUE: ICD-10-CM

## 2021-02-23 ENCOUNTER — IMMUNIZATION (OUTPATIENT)
Dept: VACCINE CLINIC | Facility: HOSPITAL | Age: 61
End: 2021-02-23

## 2021-02-23 DIAGNOSIS — Z23 IMMUNIZATION DUE: ICD-10-CM

## 2021-02-23 PROCEDURE — 0001A: CPT | Performed by: INTERNAL MEDICINE

## 2021-02-23 PROCEDURE — 91300 HC SARSCOV02 VAC 30MCG/0.3ML IM: CPT | Performed by: INTERNAL MEDICINE

## 2021-03-04 ENCOUNTER — TELEMEDICINE (OUTPATIENT)
Dept: PSYCHIATRY | Facility: CLINIC | Age: 61
End: 2021-03-04

## 2021-03-04 DIAGNOSIS — F51.05 INSOMNIA DUE TO MENTAL DISORDER: ICD-10-CM

## 2021-03-04 DIAGNOSIS — F34.1 PERSISTENT DEPRESSIVE DISORDER WITH ANXIOUS DISTRESS, CURRENTLY MODERATE: ICD-10-CM

## 2021-03-04 DIAGNOSIS — R46.89 AVOIDANT BEHAVIOR: ICD-10-CM

## 2021-03-04 DIAGNOSIS — F40.01 PANIC DISORDER WITH AGORAPHOBIA: ICD-10-CM

## 2021-03-04 DIAGNOSIS — F43.10 POST TRAUMATIC STRESS DISORDER (PTSD): Primary | ICD-10-CM

## 2021-03-04 PROCEDURE — 99214 OFFICE O/P EST MOD 30 MIN: CPT | Performed by: NURSE PRACTITIONER

## 2021-03-04 RX ORDER — BUPROPION HYDROCHLORIDE 150 MG/1
150 TABLET ORAL EVERY MORNING
Qty: 30 TABLET | Refills: 2 | Status: SHIPPED | OUTPATIENT
Start: 2021-03-04 | End: 2021-06-08

## 2021-03-04 RX ORDER — QUETIAPINE FUMARATE 200 MG/1
200 TABLET, FILM COATED ORAL NIGHTLY
Qty: 30 TABLET | Refills: 2 | Status: SHIPPED | OUTPATIENT
Start: 2021-03-04 | End: 2021-06-08 | Stop reason: SDUPTHER

## 2021-03-04 RX ORDER — PRAZOSIN HYDROCHLORIDE 5 MG/1
10 CAPSULE ORAL NIGHTLY
Qty: 60 CAPSULE | Refills: 2 | Status: SHIPPED | OUTPATIENT
Start: 2021-03-04 | End: 2021-06-08 | Stop reason: SDUPTHER

## 2021-03-04 RX ORDER — BUPROPION HYDROCHLORIDE 300 MG/1
300 TABLET ORAL EVERY MORNING
Qty: 30 TABLET | Refills: 2 | Status: SHIPPED | OUTPATIENT
Start: 2021-03-04 | End: 2021-06-08 | Stop reason: SDUPTHER

## 2021-03-04 NOTE — PROGRESS NOTES
Patient is being seen via [employer]    This provider is located at [facility] at [address] [place] [address]. The provider identified herself as well as her credentials.   The Patient is at home using his phone with video connection. The patient's condition being diagnosed/treated is appropriate for telemedicine. The patient gave consent to be seen remotely, and when consent is given they understand that the consent allows for patient identifiable information to be sent to a third party as needed.   They may refuse to be seen remotely at any time. The electronic data is encrypted and password protected, and the patient has been advised of the potential risks to privacy not withstanding such measures    Subjective   Delphine Mcmillan is a 60 y.o. female is being interviewed today via [employer] related to [facility] recommendations associated with Corona Pandemic.    Chief Complaint: Depression, anxiety, PTSD    History of Present Illness  She states that she has been doing ok with her current medications but she had to start metformin because A1c was 8.1, she is having difficulty adjusting to it.  She states that she has not had any SE from the psychotropic.  She states that she rates her PTSD about 6/10 with 10 being the worse, she states that she keeps playing things from the past in her head over and over.  She rates her depression about 5/10 with 10 being the worse, she states that if she has to do anything she tends to dread it.  She states that she has a hard time adjusting to things.  She denies any panic attacks as long as she stays at home.  She states that she is getting about 4 hours per night with NM- she has been diagnosed with sleep apnea which could be associated with frequent awakenings- she is not able to use the device- recommended that she talk to her PCP about possible alternative.  She states that she has gained a significant weight gain, she states that she is having a  difficult time adjusting to the new diet.  She states that she get the first injection of COVID vaccine.  Denies any new stressors but tired of the politics.  Denies any AV hallucinations. Denies any SI/HI.      The following portions of the patient's history were reviewed and updated as appropriate: allergies, current medications, past family history, past medical history, past social history, past surgical history and problem list.    Review of Systems   Constitutional: Negative for appetite change, chills, diaphoresis, fatigue, fever and unexpected weight change.   HENT: Negative for hearing loss, sore throat, trouble swallowing and voice change.    Eyes: Negative for photophobia and visual disturbance.   Respiratory: Negative for cough, chest tightness and shortness of breath.    Cardiovascular: Negative for chest pain and palpitations.   Gastrointestinal: Negative for abdominal pain, constipation, nausea and vomiting.   Endocrine: Negative for cold intolerance and heat intolerance.   Genitourinary: Negative for dysuria and frequency.   Musculoskeletal: Positive for arthralgias and myalgias. Negative for back pain, joint swelling and neck stiffness.   Skin: Negative for color change and wound.   Allergic/Immunologic: Negative for environmental allergies and immunocompromised state.   Neurological: Negative for dizziness, tremors, seizures, syncope, weakness, light-headedness and headaches.   Hematological: Negative for adenopathy. Does not bruise/bleed easily.     Objective    Physical Exam   Constitutional: She appears well-developed. No distress.   Neurological: She is alert. Coordination and gait normal.   Vitals reviewed.    There were no vitals taken for this visit.    Medication List:   Current Outpatient Medications   Medication Sig Dispense Refill   • atorvastatin (LIPITOR) 20 MG tablet      • buPROPion XL (Wellbutrin XL) 150 MG 24 hr tablet Take 1 tablet by mouth Every Morning. 30 tablet 2   • buPROPion  XL (WELLBUTRIN XL) 300 MG 24 hr tablet Take 1 tablet by mouth Every Morning. 30 tablet 2   • fluticasone (FLONASE) 50 MCG/ACT nasal spray 2 sprays into the nostril(s) as directed by provider Daily.     • levothyroxine (SYNTHROID, LEVOTHROID) 100 MCG tablet Daily.     • omeprazole (priLOSEC) 20 MG capsule      • prazosin (MINIPRESS) 5 MG capsule Take 2 capsules by mouth Every Night. 60 capsule 2   • QUEtiapine (SEROquel) 200 MG tablet Take 1 tablet by mouth Every Night. 30 tablet 2     No current facility-administered medications for this visit.        Mental Status Exam:   Hygiene:   good  Cooperation:  Cooperative  Eye Contact:  Good  Psychomotor Behavior:  Appropriate  Affect:  Blunted  Hopelessness: Denies  Speech:  Normal  Thought Process:  Linear  Thought Content:  Mood congruent  Suicidal:  None  Homicidal:  None  Hallucinations:  None  Delusion:  None  Memory:  Intact  Orientation:  Person, Place, Time and Situation  Reliability:  fair  Insight:  Fair  Judgement:  Fair  Impulse Control:  Fair  Physical/Medical Issues:  No     Assessment/Plan   Problems Addressed this Visit     None      Visit Diagnoses     Post traumatic stress disorder (PTSD)    -  Primary    Relevant Medications    buPROPion XL (Wellbutrin XL) 150 MG 24 hr tablet    buPROPion XL (WELLBUTRIN XL) 300 MG 24 hr tablet    prazosin (MINIPRESS) 5 MG capsule    QUEtiapine (SEROquel) 200 MG tablet    Insomnia due to mental disorder        Relevant Medications    buPROPion XL (Wellbutrin XL) 150 MG 24 hr tablet    buPROPion XL (WELLBUTRIN XL) 300 MG 24 hr tablet    prazosin (MINIPRESS) 5 MG capsule    QUEtiapine (SEROquel) 200 MG tablet    Persistent depressive disorder with anxious distress, currently moderate        Relevant Medications    buPROPion XL (Wellbutrin XL) 150 MG 24 hr tablet    buPROPion XL (WELLBUTRIN XL) 300 MG 24 hr tablet    QUEtiapine (SEROquel) 200 MG tablet    Panic disorder with agoraphobia        Relevant Medications     buPROPion XL (Wellbutrin XL) 150 MG 24 hr tablet    buPROPion XL (WELLBUTRIN XL) 300 MG 24 hr tablet    QUEtiapine (SEROquel) 200 MG tablet    Avoidant behavior        Relevant Medications    QUEtiapine (SEROquel) 200 MG tablet      Diagnoses       Codes Comments    Post traumatic stress disorder (PTSD)    -  Primary ICD-10-CM: F43.10  ICD-9-CM: 309.81     Insomnia due to mental disorder     ICD-10-CM: F51.05  ICD-9-CM: 300.9, 327.02     Persistent depressive disorder with anxious distress, currently moderate     ICD-10-CM: F34.1  ICD-9-CM: 300.4     Panic disorder with agoraphobia     ICD-10-CM: F40.01  ICD-9-CM: 300.21     Avoidant behavior     ICD-10-CM: R46.89  ICD-9-CM: V40.39           Discussed medication options. Continue bupropion for depression, concentration and focus, seroquel for thoughts and mood, prazosin for NM. Reviewed the risks, benefits, and side effects of the medications; patient acknowledged and verbally consented.  Patient is agreeable to call the [facility] with any worsening of symptoms.  Patient is aware to call 911 or go to the nearest ER should begin having SI/HI    Prognosis: Guarded dependent on medication, follow up appointment and treatment plan compliance .     Functionality: Poor.  Symptoms have returned since stopping medications but agreeable to restart.    Treatment plan completed on 3/4/21    Return in 12 weeks

## 2021-03-04 NOTE — TREATMENT PLAN
Multi-Disciplinary Problems (from Behavioral Health Treatment Plan)    Active Problems     Problem: Depression  Start Date: 03/04/21    Problem Details: The patient self-scales this problem as a 5 with 10 being the worst.              Problem: Post Traumatic Stress  Start Date: 03/04/21    Problem Details: The patient self-scales this problem as a 6 with 10 being the worst.                           I have discussed and reviewed this treatment plan with the patient.  It has been printed for signatures.

## 2021-03-16 ENCOUNTER — IMMUNIZATION (OUTPATIENT)
Dept: VACCINE CLINIC | Facility: HOSPITAL | Age: 61
End: 2021-03-16

## 2021-03-16 PROCEDURE — 91300 HC SARSCOV02 VAC 30MCG/0.3ML IM: CPT | Performed by: INTERNAL MEDICINE

## 2021-03-16 PROCEDURE — 0002A: CPT | Performed by: INTERNAL MEDICINE

## 2021-06-08 ENCOUNTER — TELEMEDICINE (OUTPATIENT)
Dept: PSYCHIATRY | Facility: CLINIC | Age: 61
End: 2021-06-08

## 2021-06-08 DIAGNOSIS — R46.89 AVOIDANT BEHAVIOR: ICD-10-CM

## 2021-06-08 DIAGNOSIS — F43.10 POST TRAUMATIC STRESS DISORDER (PTSD): Primary | ICD-10-CM

## 2021-06-08 DIAGNOSIS — F40.01 PANIC DISORDER WITH AGORAPHOBIA: ICD-10-CM

## 2021-06-08 DIAGNOSIS — F34.1 PERSISTENT DEPRESSIVE DISORDER WITH ANXIOUS DISTRESS, CURRENTLY MODERATE: ICD-10-CM

## 2021-06-08 DIAGNOSIS — F51.05 INSOMNIA DUE TO MENTAL DISORDER: ICD-10-CM

## 2021-06-08 PROCEDURE — 90833 PSYTX W PT W E/M 30 MIN: CPT | Performed by: NURSE PRACTITIONER

## 2021-06-08 PROCEDURE — 99214 OFFICE O/P EST MOD 30 MIN: CPT | Performed by: NURSE PRACTITIONER

## 2021-06-08 RX ORDER — BUPROPION HYDROCHLORIDE 300 MG/1
300 TABLET ORAL EVERY MORNING
Qty: 30 TABLET | Refills: 2 | Status: SHIPPED | OUTPATIENT
Start: 2021-06-08 | End: 2021-09-21 | Stop reason: SDUPTHER

## 2021-06-08 RX ORDER — PRAZOSIN HYDROCHLORIDE 5 MG/1
10 CAPSULE ORAL NIGHTLY
Qty: 60 CAPSULE | Refills: 2 | Status: SHIPPED | OUTPATIENT
Start: 2021-06-08 | End: 2021-09-21 | Stop reason: SDUPTHER

## 2021-06-08 RX ORDER — QUETIAPINE FUMARATE 200 MG/1
200 TABLET, FILM COATED ORAL NIGHTLY
Qty: 30 TABLET | Refills: 2 | Status: SHIPPED | OUTPATIENT
Start: 2021-06-08 | End: 2021-09-21 | Stop reason: SDUPTHER

## 2021-06-08 NOTE — PROGRESS NOTES
Patient is being seen via [employer]    This provider is located at [facility] at [address] [place] [address]. The provider identified herself as well as her credentials.   The Patient is at home using his phone with video connection. The patient's condition being diagnosed/treated is appropriate for telemedicine. The patient gave consent to be seen remotely, and when consent is given they understand that the consent allows for patient identifiable information to be sent to a third party as needed.   They may refuse to be seen remotely at any time. The electronic data is encrypted and password protected, and the patient has been advised of the potential risks to privacy not withstanding such measures    Subjective   Delphine Mcmillan is a 60 y.o. female is being interviewed today via [employer] related to CDC recommendations associated with Corona Pandemic.    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  She states that she went to her PCP the other day and forgot to talk about getting a sleep study.  She states that she has been taking the medications as prescribed with no SE or problems.  She states that when she was at the PCP she was changed from metformin to glipizide which she hasn't started yet; she is worried about the diarrhea associated with it.  She rates her depression about 6/10 with 10 being the worse.  She states that she continues to have bouts of constant guilt about everything.  She rates her anxiety about 7/10 with 10 being the worse, she is constantly thinking about no doing enough and her relationship with her family.   She states that she is getting about 4 hours per night with frequent awaking throughout the night with NM of past events and anxiety producing dreams.  She rates her PTSD symptoms about 8/10 with 10 being  the worse, she states that she has flashbacks with triggers.  Appetite has been good with some weight loss.  Denies any other health issues, states that she has had both the vaccinations.  She states that she and her  will be  for 40 years at the end of June, recommended that she do something with him; also recommended that she do some self wellness studies by her next visit.  Denies any SI/HI, denies any AV hallucinations.      The following portions of the patient's history were reviewed and updated as appropriate: allergies, current medications, past family history, past medical history, past social history, past surgical history and problem list.    Review of Systems   Constitutional: Negative for appetite change, chills, diaphoresis, fatigue, fever and unexpected weight change.   HENT: Negative for hearing loss, sore throat, trouble swallowing and voice change.    Eyes: Negative for photophobia and visual disturbance.   Respiratory: Negative for cough, chest tightness and shortness of breath.    Cardiovascular: Negative for chest pain and palpitations.   Gastrointestinal: Negative for abdominal pain, constipation, nausea and vomiting.   Endocrine: Negative for cold intolerance and heat intolerance.   Genitourinary: Negative for dysuria and frequency.   Musculoskeletal: Positive for arthralgias and myalgias. Negative for back pain, joint swelling and neck stiffness.   Skin: Negative for color change and wound.   Allergic/Immunologic: Negative for environmental allergies and immunocompromised state.   Neurological: Negative for dizziness, tremors, seizures, syncope, weakness, light-headedness and headaches.   Hematological: Negative for adenopathy. Does not bruise/bleed easily.     Objective    Physical Exam   Constitutional: She appears well-developed. No distress.   Neurological: She is alert. Coordination and gait normal.   Vitals reviewed.    There were no vitals taken for this  visit.    Medication List:   Current Outpatient Medications   Medication Sig Dispense Refill   • atorvastatin (LIPITOR) 20 MG tablet      • buPROPion XL (WELLBUTRIN XL) 300 MG 24 hr tablet Take 1 tablet by mouth Every Morning. 30 tablet 2   • fluticasone (FLONASE) 50 MCG/ACT nasal spray 2 sprays into the nostril(s) as directed by provider Daily.     • levothyroxine (SYNTHROID, LEVOTHROID) 100 MCG tablet Daily.     • omeprazole (priLOSEC) 20 MG capsule      • prazosin (MINIPRESS) 5 MG capsule Take 2 capsules by mouth Every Night. 60 capsule 2   • QUEtiapine (SEROquel) 200 MG tablet Take 1 tablet by mouth Every Night. 30 tablet 2     No current facility-administered medications for this visit.       Mental Status Exam:   Hygiene:   good  Cooperation:  Cooperative  Eye Contact:  Good  Psychomotor Behavior:  Appropriate  Affect:  Blunted  Hopelessness: Denies  Speech:  Normal  Thought Process:  Linear  Thought Content:  Mood congruent  Suicidal:  None  Homicidal:  None  Hallucinations:  None  Delusion:  None  Memory:  Intact  Orientation:  Person, Place, Time and Situation  Reliability:  fair  Insight:  Fair  Judgement:  Fair  Impulse Control:  Fair  Physical/Medical Issues:  No     Assessment/Plan   Problems Addressed this Visit     None      Visit Diagnoses     Post traumatic stress disorder (PTSD)    -  Primary    Relevant Medications    buPROPion XL (WELLBUTRIN XL) 300 MG 24 hr tablet    prazosin (MINIPRESS) 5 MG capsule    QUEtiapine (SEROquel) 200 MG tablet    Insomnia due to mental disorder        Relevant Medications    buPROPion XL (WELLBUTRIN XL) 300 MG 24 hr tablet    prazosin (MINIPRESS) 5 MG capsule    QUEtiapine (SEROquel) 200 MG tablet    Persistent depressive disorder with anxious distress, currently moderate        Relevant Medications    buPROPion XL (WELLBUTRIN XL) 300 MG 24 hr tablet    QUEtiapine (SEROquel) 200 MG tablet    Panic disorder with agoraphobia        Relevant Medications    buPROPion XL  (WELLBUTRIN XL) 300 MG 24 hr tablet    QUEtiapine (SEROquel) 200 MG tablet    Avoidant behavior        Relevant Medications    QUEtiapine (SEROquel) 200 MG tablet      Diagnoses       Codes Comments    Post traumatic stress disorder (PTSD)    -  Primary ICD-10-CM: F43.10  ICD-9-CM: 309.81     Insomnia due to mental disorder     ICD-10-CM: F51.05  ICD-9-CM: 300.9, 327.02     Persistent depressive disorder with anxious distress, currently moderate     ICD-10-CM: F34.1  ICD-9-CM: 300.4     Panic disorder with agoraphobia     ICD-10-CM: F40.01  ICD-9-CM: 300.21     Avoidant behavior     ICD-10-CM: R46.89  ICD-9-CM: V40.39           Discussed medication options. Continue but decrease bupropion for depression, concentration and focus, seroquel for thoughts and mood, prazosin for NM. Reviewed the risks, benefits, and side effects of the medications; patient acknowledged and verbally consented.  Patient is agreeable to call the [facility] with any worsening of symptoms.  Patient is aware to call 911 or go to the nearest ER should begin having SI/HI    Prognosis: Guarded dependent on medication, follow up appointment and treatment plan compliance .     Functionality: Poor.  Symptoms have returned since stopping medications but agreeable to restart.    Treatment plan completed on 3/4/21    Start Time: 900a   Stop Time: 930a    30 minutes face to face with patient was spent for psychotherapy. Assisted patient in processing patient’s Anxiety and Depression.  Acknowledged and normalized patient’s thoughts, feelings, and concerns. Utilized cognitive behavioral therapy to assist the patient in recognizing more appropriate coping mechanisms when (she/he) becomes agitated/anxious which are proven effective in reducing the severity of frequency of symptoms. Strongly urged to continue to eat better balanced and healthier foods in reducing further health risks. Allowed patient to freely discuss issues without interruption or  judgment.    Return in 12 weeks

## 2021-09-21 ENCOUNTER — TELEMEDICINE (OUTPATIENT)
Dept: PSYCHIATRY | Facility: CLINIC | Age: 61
End: 2021-09-21

## 2021-09-21 DIAGNOSIS — F43.10 POST TRAUMATIC STRESS DISORDER (PTSD): Primary | ICD-10-CM

## 2021-09-21 DIAGNOSIS — F34.1 PERSISTENT DEPRESSIVE DISORDER WITH ANXIOUS DISTRESS, CURRENTLY MODERATE: ICD-10-CM

## 2021-09-21 DIAGNOSIS — F51.05 INSOMNIA DUE TO MENTAL DISORDER: ICD-10-CM

## 2021-09-21 DIAGNOSIS — R46.89 AVOIDANT BEHAVIOR: ICD-10-CM

## 2021-09-21 DIAGNOSIS — F40.01 PANIC DISORDER WITH AGORAPHOBIA: ICD-10-CM

## 2021-09-21 PROCEDURE — 90833 PSYTX W PT W E/M 30 MIN: CPT | Performed by: NURSE PRACTITIONER

## 2021-09-21 PROCEDURE — 99214 OFFICE O/P EST MOD 30 MIN: CPT | Performed by: NURSE PRACTITIONER

## 2021-09-21 RX ORDER — QUETIAPINE FUMARATE 200 MG/1
200 TABLET, FILM COATED ORAL NIGHTLY
Qty: 30 TABLET | Refills: 2 | Status: SHIPPED | OUTPATIENT
Start: 2021-09-21 | End: 2021-12-30 | Stop reason: SDUPTHER

## 2021-09-21 RX ORDER — PRAZOSIN HYDROCHLORIDE 5 MG/1
10 CAPSULE ORAL NIGHTLY
Qty: 60 CAPSULE | Refills: 2 | Status: SHIPPED | OUTPATIENT
Start: 2021-09-21 | End: 2021-12-30 | Stop reason: SDUPTHER

## 2021-09-21 RX ORDER — BUPROPION HYDROCHLORIDE 300 MG/1
300 TABLET ORAL EVERY MORNING
Qty: 30 TABLET | Refills: 2 | Status: SHIPPED | OUTPATIENT
Start: 2021-09-21 | End: 2021-12-30 | Stop reason: SDUPTHER

## 2021-09-21 NOTE — PROGRESS NOTES
"Patient is being seen via [employer]    This provider is located at [facility] at [address] [place] [address]. The provider identified herself as well as her credentials.   The Patient is at home using his phone with video connection. The patient's condition being diagnosed/treated is appropriate for telemedicine. The patient gave consent to be seen remotely, and when consent is given they understand that the consent allows for patient identifiable information to be sent to a third party as needed.   They may refuse to be seen remotely at any time. The electronic data is encrypted and password protected, and the patient has been advised of the potential risks to privacy not withstanding such measures    Subjective   Delphine Mcmillan is a 61 y.o. female is being interviewed today via [employer] related to CDC recommendations associated with Corona Pandemic.    Chief Complaint: Depression, anxiety, PTSD    History of Present Illness  She states that she has been doing ok, she states that she just feels tired, \"its not a new thing\".  She states that she has been doing \"fine\" with her medications, although her PCP has increased her thyroid medication which may help a little bit with her energy levels.  She states that she has been taking her medications as prescribed with no SE or problems. She rates her depression about 6/10 with 10 being the worse, she states that she has not felt like doing anything, everything is stressful and everybody else's problems is her own.  She rates her anxiety about 7/10 with 10 being the worse, she states that she only has panic attacks when she has to get out of the house.  Shares that she had to take her  to [facility] for a procedure that caused a lot of stress, but she was able to do it.  She states that she continues to have NM associated with PTSD which she rates about 5/10 with 10 being the worse.  She states that she does continue to have " "flashbacks but is able to reason \"that stuff is over with\".  She states that she is getting about 5 hours per night.  She states her appetite has decreased, she is slowly losing weight.  She denies any new health issues with no antibiotic treatment.  She states that she gets stressed out about family dysfunction with her grown children lives, she worries about them.  Denies any SI/HI, denies any AV hallucinations.  Denies any substance use.      The following portions of the patient's history were reviewed and updated as appropriate: allergies, current medications, past family history, past medical history, past social history, past surgical history and problem list.    Review of Systems   Musculoskeletal: Positive for arthralgias and back pain.        Joint knee pain, right   Psychiatric/Behavioral: Positive for dysphoric mood and sleep disturbance. The patient is nervous/anxious.      Objective    Physical Exam   Constitutional: She appears well-developed. No distress.   Neurological: She is alert.     There were no vitals taken for this visit.    Medication List:   Current Outpatient Medications   Medication Sig Dispense Refill   • atorvastatin (LIPITOR) 20 MG tablet      • buPROPion XL (WELLBUTRIN XL) 300 MG 24 hr tablet Take 1 tablet by mouth Every Morning. 30 tablet 2   • fluticasone (FLONASE) 50 MCG/ACT nasal spray 2 sprays into the nostril(s) as directed by provider Daily.     • levothyroxine (SYNTHROID, LEVOTHROID) 100 MCG tablet Daily.     • omeprazole (priLOSEC) 20 MG capsule      • prazosin (MINIPRESS) 5 MG capsule Take 2 capsules by mouth Every Night. 60 capsule 2   • QUEtiapine (SEROquel) 200 MG tablet Take 1 tablet by mouth Every Night. 30 tablet 2     No current facility-administered medications for this visit.       Mental Status Exam:   Hygiene:   good  Cooperation:  Cooperative  Eye Contact:  Good  Psychomotor Behavior:  Appropriate  Affect:  Blunted  Hopelessness: Denies  Speech:  Normal  Thought " Process:  Linear  Thought Content:  Mood congruent  Suicidal:  None  Homicidal:  None  Hallucinations:  None  Delusion:  None  Memory:  Intact  Orientation:  Person, Place, Time and Situation  Reliability:  fair  Insight:  Fair  Judgement:  Fair  Impulse Control:  Fair  Physical/Medical Issues:  No     Assessment/Plan   Problems Addressed this Visit     None      Visit Diagnoses     Post traumatic stress disorder (PTSD)    -  Primary    Relevant Medications    buPROPion XL (WELLBUTRIN XL) 300 MG 24 hr tablet    prazosin (MINIPRESS) 5 MG capsule    QUEtiapine (SEROquel) 200 MG tablet    Insomnia due to mental disorder        Relevant Medications    buPROPion XL (WELLBUTRIN XL) 300 MG 24 hr tablet    prazosin (MINIPRESS) 5 MG capsule    QUEtiapine (SEROquel) 200 MG tablet    Persistent depressive disorder with anxious distress, currently moderate        Relevant Medications    buPROPion XL (WELLBUTRIN XL) 300 MG 24 hr tablet    QUEtiapine (SEROquel) 200 MG tablet    Panic disorder with agoraphobia        Relevant Medications    buPROPion XL (WELLBUTRIN XL) 300 MG 24 hr tablet    QUEtiapine (SEROquel) 200 MG tablet    Avoidant behavior        Relevant Medications    QUEtiapine (SEROquel) 200 MG tablet      Diagnoses       Codes Comments    Post traumatic stress disorder (PTSD)    -  Primary ICD-10-CM: F43.10  ICD-9-CM: 309.81     Insomnia due to mental disorder     ICD-10-CM: F51.05  ICD-9-CM: 300.9, 327.02     Persistent depressive disorder with anxious distress, currently moderate     ICD-10-CM: F34.1  ICD-9-CM: 300.4     Panic disorder with agoraphobia     ICD-10-CM: F40.01  ICD-9-CM: 300.21     Avoidant behavior     ICD-10-CM: R46.89  ICD-9-CM: V40.39           Discussed medication options. Continue bupropion for depression, concentration and focus, seroquel for thoughts and mood, prazosin for NM. Reviewed the risks, benefits, and side effects of the medications; patient acknowledged and verbally consented.   Patient is agreeable to call the [facility] with any worsening of symptoms.  Patient is aware to call 911 or go to the nearest ER should begin having SI/HI.  Medical planning reviewed with no changes in medications.     Prognosis: Guarded dependent on medication, follow up appointment and treatment plan compliance .     Functionality: Poor.  Symptoms have returned since stopping medications but agreeable to restart.    Treatment plan completed on 3/4/21    Start Time: 900a   Stop Time: 930a    30 minutes face to face with patient was spent for psychotherapy. Assisted patient in processing patient’s Anxiety and Depression.  Acknowledged and normalized patient’s thoughts, feelings, and concerns. Utilized cognitive behavioral therapy to assist the patient in recognizing more appropriate coping mechanisms when she becomes agitated/anxious which are proven effective in reducing the severity of frequency of symptoms. Strongly urged to continue to eat better balanced and healthier foods in reducing further health risks. Allowed patient to freely discuss issues without interruption or judgment.    Return in 12 weeks

## 2021-12-30 DIAGNOSIS — F51.05 INSOMNIA DUE TO MENTAL DISORDER: ICD-10-CM

## 2021-12-30 DIAGNOSIS — R46.89 AVOIDANT BEHAVIOR: ICD-10-CM

## 2021-12-30 DIAGNOSIS — F43.10 POST TRAUMATIC STRESS DISORDER (PTSD): ICD-10-CM

## 2021-12-30 DIAGNOSIS — F34.1 PERSISTENT DEPRESSIVE DISORDER WITH ANXIOUS DISTRESS, CURRENTLY MODERATE: ICD-10-CM

## 2021-12-30 RX ORDER — PRAZOSIN HYDROCHLORIDE 5 MG/1
10 CAPSULE ORAL NIGHTLY
Qty: 60 CAPSULE | Refills: 2 | Status: SHIPPED | OUTPATIENT
Start: 2021-12-30 | End: 2022-01-27 | Stop reason: SDUPTHER

## 2021-12-30 RX ORDER — QUETIAPINE FUMARATE 200 MG/1
200 TABLET, FILM COATED ORAL NIGHTLY
Qty: 30 TABLET | Refills: 2 | Status: SHIPPED | OUTPATIENT
Start: 2021-12-30 | End: 2022-03-28 | Stop reason: SDUPTHER

## 2021-12-30 RX ORDER — QUETIAPINE FUMARATE 200 MG/1
200 TABLET, FILM COATED ORAL NIGHTLY
Qty: 30 TABLET | Refills: 2 | Status: CANCELLED | OUTPATIENT
Start: 2021-12-30

## 2021-12-30 RX ORDER — PRAZOSIN HYDROCHLORIDE 5 MG/1
10 CAPSULE ORAL NIGHTLY
Qty: 60 CAPSULE | Refills: 2 | Status: CANCELLED | OUTPATIENT
Start: 2021-12-30

## 2021-12-30 RX ORDER — BUPROPION HYDROCHLORIDE 300 MG/1
300 TABLET ORAL EVERY MORNING
Qty: 30 TABLET | Refills: 2 | Status: CANCELLED | OUTPATIENT
Start: 2021-12-30

## 2021-12-30 RX ORDER — BUPROPION HYDROCHLORIDE 300 MG/1
300 TABLET ORAL EVERY MORNING
Qty: 30 TABLET | Refills: 2 | Status: SHIPPED | OUTPATIENT
Start: 2021-12-30 | End: 2022-01-27 | Stop reason: SDUPTHER

## 2022-01-27 DIAGNOSIS — R46.89 AVOIDANT BEHAVIOR: ICD-10-CM

## 2022-01-27 DIAGNOSIS — F43.10 POST TRAUMATIC STRESS DISORDER (PTSD): ICD-10-CM

## 2022-01-27 DIAGNOSIS — F51.05 INSOMNIA DUE TO MENTAL DISORDER: ICD-10-CM

## 2022-01-27 DIAGNOSIS — F34.1 PERSISTENT DEPRESSIVE DISORDER WITH ANXIOUS DISTRESS, CURRENTLY MODERATE: ICD-10-CM

## 2022-01-27 RX ORDER — BUPROPION HYDROCHLORIDE 300 MG/1
300 TABLET ORAL EVERY MORNING
Qty: 30 TABLET | Refills: 2 | Status: SHIPPED | OUTPATIENT
Start: 2022-01-27 | End: 2022-03-28 | Stop reason: SDUPTHER

## 2022-01-27 RX ORDER — PRAZOSIN HYDROCHLORIDE 5 MG/1
10 CAPSULE ORAL NIGHTLY
Qty: 60 CAPSULE | Refills: 2 | Status: SHIPPED | OUTPATIENT
Start: 2022-01-27 | End: 2022-03-28 | Stop reason: SDUPTHER

## 2022-03-22 NOTE — PROGRESS NOTES
Subjective   Delphine Mcmillan is a 61 y.o. female who presents today for initial evaluation     Chief Complaint:  Depression, anxiety, PTSD    History of Present Illness:   Ms. Mcmillan presents today for medication management follow up at the [facility] for initial visit after being transferred from [facility] for continuation of treatment.  Verbalizes that she is doing well overall with current medication regimen.  Rates anxiety 9/10 with 10 being the worst.  Says that anxiety is elevated today as she was nervous about appointment this morning.  Says that last time that she had a new provider, medication changes were made that was stressful.  Rates depression 5/10 with 10 being the worst, says this level is good for her as she feels like symptoms have improved.  Has other history of PTSD and says that those symptoms are always present sometimes the severity is worse than others.  Sleeping about 4 to 5 hours each night with nightmares that occur about 5 out of 7 nights per week.  Says that prazosin helps with nightmares.  Appetite is good, denies any weight changes.  She does say that she becomes more anxious and stressed when she has to leave her house.  Was diagnosed with ADD by Dr. Montanez in 2017.  Was previously on methylphenidate but medication was discontinued after initial visit with previous provider.  She does say that she continues to have symptoms associated with ADD, like racing thoughts.  Says that symptoms are manageable with medication regimen.  Denies any adverse effects of medications.  Denies any SI/HI or AV hallucinations.     The following portions of the patient's history were reviewed and updated as appropriate: allergies, current medications, past family history, past medical history, past social history, past surgical history and problem list.      Past Medical History:  Past Medical History:   Diagnosis Date   • Anxiety    • Chronic pain disorder    • Depression    • Disease of  thyroid gland    • Hyperlipidemia    • Type 2 diabetes mellitus (HCC)        Social History:  Social History     Socioeconomic History   • Marital status:    Tobacco Use   • Smoking status: Never Smoker   • Smokeless tobacco: Never Used   Substance and Sexual Activity   • Alcohol use: No   • Drug use: No   • Sexual activity: Defer       Family History:  Family History   Problem Relation Age of Onset   • Anxiety disorder Mother    • Depression Mother    • Anxiety disorder Sister    • Depression Sister        Past Surgical History:  History reviewed. No pertinent surgical history.    Problem List:  Patient Active Problem List   Diagnosis   • Morbidly obese (HCC)       Allergy:   Allergies   Allergen Reactions   • Codeine Hives and Itching   • Acetaminophen Rash        Current Medications:   Current Outpatient Medications   Medication Sig Dispense Refill   • atorvastatin (LIPITOR) 80 MG tablet      • buPROPion XL (WELLBUTRIN XL) 300 MG 24 hr tablet Take 1 tablet by mouth Every Morning. 30 tablet 2   • glipiZIDE XL 10 MG 24 hr tablet      • Januvia 100 MG tablet      • levothyroxine (SYNTHROID, LEVOTHROID) 150 MCG tablet      • LORazepam (ATIVAN) 0.5 MG tablet Take 0.5 mg by mouth Every 8 (Eight) Hours As Needed for Anxiety. For dental work, to ease anxiety.     • omeprazole (priLOSEC) 20 MG capsule      • prazosin (MINIPRESS) 5 MG capsule Take 2 capsules by mouth Every Night. 60 capsule 2   • QUEtiapine (SEROquel) 200 MG tablet Take 1 tablet by mouth Every Night. 30 tablet 2   • levothyroxine (SYNTHROID, LEVOTHROID) 100 MCG tablet Daily.       No current facility-administered medications for this visit.       Review of Symptoms:    Review of Systems   Constitutional: Negative for fatigue.   Respiratory: Negative for shortness of breath.    Cardiovascular: Negative for chest pain.   Psychiatric/Behavioral: Positive for sleep disturbance and depressed mood. Negative for suicidal ideas. The patient is  "nervous/anxious.          Physical Exam:   Physical Exam  Vitals reviewed.   Constitutional:       General: She is not in acute distress.     Appearance: Normal appearance.   Neurological:      Mental Status: She is alert.      Gait: Gait normal.       Vitals:   Blood pressure 126/82, pulse 106, height 167.6 cm (66\"), weight 108 kg (239 lb).    Mental Status Exam:   Hygiene:   good  Cooperation:  Cooperative  Eye Contact:  Fair  Psychomotor Behavior:  Appropriate  Affect:  Restricted  Mood: depressed  Hopelessness: Denies  Speech:  Minimal  Thought Process:  Linear  Thought Content:  Mood congruent  Suicidal:  None  Homicidal:  None  Hallucinations:  None  Delusion:  Paranoid  Memory:  Intact  Orientation:  Person, Place, Time and Situation  Reliability:  fair  Insight:  Fair  Judgement:  Fair  Impulse Control:  Fair    PHQ-9 Score:   PHQ-9 Total Score:      Lab Results:   Telemedicine on 03/28/2022   Component Date Value Ref Range Status   • External Amphetamine Screen Urine 03/28/2022 Negative   Final   • External Benzodiazepine Screen Uri* 03/28/2022 Negative   Final   • External Cocaine Screen Urine 03/28/2022 Negative   Final   • External THC Screen Urine 03/28/2022 Negative   Final   • External Methadone Screen Urine 03/28/2022 Negative   Final   • External Methamphetamine Screen Ur* 03/28/2022 Negative   Final   • External Oxycodone Screen Urine 03/28/2022 Negative   Final   • External Buprenorphine Screen Urine 03/28/2022 Negative   Final   • External MDMA 03/28/2022 Negative   Final   • External Opiates Screen Urine 03/28/2022 Negative   Final       EKG Results:  No orders to display       Assessment/Plan   Problems Addressed this Visit    None     Visit Diagnoses     Moderate episode of recurrent major depressive disorder (HCC)    -  Primary    Relevant Medications    LORazepam (ATIVAN) 0.5 MG tablet    buPROPion XL (WELLBUTRIN XL) 300 MG 24 hr tablet    QUEtiapine (SEROquel) 200 MG tablet    Generalized " anxiety disorder        Relevant Medications    LORazepam (ATIVAN) 0.5 MG tablet    buPROPion XL (WELLBUTRIN XL) 300 MG 24 hr tablet    QUEtiapine (SEROquel) 200 MG tablet    Post traumatic stress disorder (PTSD)        Relevant Medications    LORazepam (ATIVAN) 0.5 MG tablet    buPROPion XL (WELLBUTRIN XL) 300 MG 24 hr tablet    prazosin (MINIPRESS) 5 MG capsule    QUEtiapine (SEROquel) 200 MG tablet    Insomnia due to mental disorder        Relevant Medications    LORazepam (ATIVAN) 0.5 MG tablet    buPROPion XL (WELLBUTRIN XL) 300 MG 24 hr tablet    prazosin (MINIPRESS) 5 MG capsule    QUEtiapine (SEROquel) 200 MG tablet    Medication management        Relevant Orders    [facility] Screen (Completed)      Diagnoses       Codes Comments    Moderate episode of recurrent major depressive disorder (HCC)    -  Primary ICD-10-CM: F33.1  ICD-9-CM: 296.32     Generalized anxiety disorder     ICD-10-CM: F41.1  ICD-9-CM: 300.02     Post traumatic stress disorder (PTSD)     ICD-10-CM: F43.10  ICD-9-CM: 309.81     Insomnia due to mental disorder     ICD-10-CM: F51.05  ICD-9-CM: 300.9, 327.02     Medication management     ICD-10-CM: Z79.899  ICD-9-CM: V58.69           Visit Diagnoses:    ICD-10-CM ICD-9-CM   1. Moderate episode of recurrent major depressive disorder (HCC)  F33.1 296.32   2. Generalized anxiety disorder  F41.1 300.02   3. Post traumatic stress disorder (PTSD)  F43.10 309.81   4. Insomnia due to mental disorder  F51.05 300.9     327.02   5. Medication management  Z79.899 V58.69       GOALS:  Short Term Goals: Patient will be compliant with medication, and patient will have no significant medication related side effects.  Patient will be engaged in psychotherapy as indicated.  Patient will report subjective improvement of symptoms.  Long term goals: To stabilize mood and treat/improve subjective symptoms, the patient will stay out of the hospital, the patient will be at an optimal level of functioning, and the  patient will take all medications as prescribed.  The patient/guardian verbalized understanding and agreement with goals that were mutually set.    TREATMENT PLAN: Continue supportive psychotherapy efforts and medications as indicated for patient's diagnosis.  Pharmacological and Non-Pharmacological treatment options discussed during today's visit. Patient/Guardian acknowledged and verbally consented with current treatment plan and was educated on the importance of compliance with treatment and follow-up appointments.      Discussed medication options and treatment plan of prescribed medication as well as the risks, benefits, any black box warnings, and side effects including potential falls, possible impaired driving, and metabolic adversities among others. Patient is agreeable to call the office with any worsening of symptoms or onset of side effects, or if any concerns or questions arise.  The contact information for the office is made available to the patient. Patient is agreeable to call 911 or go to the nearest ER should they begin having any SI/HI, or if any urgent concerns arise. No medication side effects or related complaints today.     -Reviewed previous available documentation     -Reviewed most recent available labs      -SALLIE reviewed and is appropriate.    -The benefits of a healthy diet and exercise were discussed with patient, especially the positive effects they have on mental health. Patient encouraged to consider lifestyle modification regarding diet and exercise patterns to maximize results of mental health treatment.     -Continue bupropion  mg daily for depression  -Continue prazosin 10 mg nightly for nightmares  -Continue quetiapine 200 mg nightly for mood/anxiety/PTSD/paranoia      MEDS ORDERED DURING VISIT:  New Medications Ordered This Visit   Medications   • buPROPion XL (WELLBUTRIN XL) 300 MG 24 hr tablet     Sig: Take 1 tablet by mouth Every Morning.     Dispense:  30 tablet      Refill:  2   • prazosin (MINIPRESS) 5 MG capsule     Sig: Take 2 capsules by mouth Every Night.     Dispense:  60 capsule     Refill:  2   • QUEtiapine (SEROquel) 200 MG tablet     Sig: Take 1 tablet by mouth Every Night.     Dispense:  30 tablet     Refill:  2       FOLLOW UP:  Return in about 3 months (around 6/28/2022) for Recheck.             This document has been electronically signed by [facility]  March 28, 2022 08:57 EDT    Please note that portions of this note were completed with a voice recognition program. Efforts were made to edit dictation, but occasionally words are mistranscribed.

## 2022-03-28 ENCOUNTER — TELEMEDICINE (OUTPATIENT)
Dept: PSYCHIATRY | Facility: CLINIC | Age: 62
End: 2022-03-28

## 2022-03-28 VITALS
DIASTOLIC BLOOD PRESSURE: 82 MMHG | HEIGHT: 66 IN | BODY MASS INDEX: 38.41 KG/M2 | HEART RATE: 106 BPM | SYSTOLIC BLOOD PRESSURE: 126 MMHG | WEIGHT: 239 LBS

## 2022-03-28 DIAGNOSIS — F41.1 GENERALIZED ANXIETY DISORDER: ICD-10-CM

## 2022-03-28 DIAGNOSIS — Z79.899 MEDICATION MANAGEMENT: ICD-10-CM

## 2022-03-28 DIAGNOSIS — F33.1 MODERATE EPISODE OF RECURRENT MAJOR DEPRESSIVE DISORDER: Primary | ICD-10-CM

## 2022-03-28 DIAGNOSIS — F43.10 POST TRAUMATIC STRESS DISORDER (PTSD): ICD-10-CM

## 2022-03-28 DIAGNOSIS — F51.05 INSOMNIA DUE TO MENTAL DISORDER: ICD-10-CM

## 2022-03-28 LAB
EXTERNAL AMPHETAMINE SCREEN URINE: NEGATIVE
EXTERNAL BENZODIAZEPINE SCREEN URINE: NEGATIVE
EXTERNAL BUPRENORPHINE SCREEN URINE: NEGATIVE
EXTERNAL COCAINE SCREEN URINE: NEGATIVE
EXTERNAL MDMA: NEGATIVE
EXTERNAL METHADONE SCREEN URINE: NEGATIVE
EXTERNAL METHAMPHETAMINE SCREEN URINE: NEGATIVE
EXTERNAL OPIATES SCREEN URINE: NEGATIVE
EXTERNAL OXYCODONE SCREEN URINE: NEGATIVE
EXTERNAL THC SCREEN URINE: NEGATIVE

## 2022-03-28 PROCEDURE — 90792 PSYCH DIAG EVAL W/MED SRVCS: CPT | Performed by: NURSE PRACTITIONER

## 2022-03-28 RX ORDER — LEVOTHYROXINE SODIUM 0.15 MG/1
TABLET ORAL
COMMUNITY
Start: 2022-03-24

## 2022-03-28 RX ORDER — QUETIAPINE FUMARATE 200 MG/1
200 TABLET, FILM COATED ORAL NIGHTLY
Qty: 30 TABLET | Refills: 2 | Status: SHIPPED | OUTPATIENT
Start: 2022-03-28 | End: 2022-06-28 | Stop reason: SDUPTHER

## 2022-03-28 RX ORDER — ATORVASTATIN CALCIUM 80 MG/1
TABLET, FILM COATED ORAL
COMMUNITY
Start: 2022-03-24

## 2022-03-28 RX ORDER — BUPROPION HYDROCHLORIDE 300 MG/1
300 TABLET ORAL EVERY MORNING
Qty: 30 TABLET | Refills: 2 | Status: SHIPPED | OUTPATIENT
Start: 2022-03-28 | End: 2022-06-28 | Stop reason: SDUPTHER

## 2022-03-28 RX ORDER — SITAGLIPTIN 100 MG/1
TABLET, FILM COATED ORAL
COMMUNITY
Start: 2022-03-24 | End: 2023-02-14

## 2022-03-28 RX ORDER — LORAZEPAM 0.5 MG/1
0.5 TABLET ORAL EVERY 8 HOURS PRN
COMMUNITY
End: 2022-09-29

## 2022-03-28 RX ORDER — PRAZOSIN HYDROCHLORIDE 5 MG/1
10 CAPSULE ORAL NIGHTLY
Qty: 60 CAPSULE | Refills: 2 | Status: SHIPPED | OUTPATIENT
Start: 2022-03-28 | End: 2022-06-28 | Stop reason: SDUPTHER

## 2022-03-28 RX ORDER — GLIPIZIDE 10 MG/1
TABLET, FILM COATED, EXTENDED RELEASE ORAL
COMMUNITY
Start: 2022-03-24

## 2022-06-28 ENCOUNTER — TELEMEDICINE (OUTPATIENT)
Dept: PSYCHIATRY | Facility: CLINIC | Age: 62
End: 2022-06-28

## 2022-06-28 DIAGNOSIS — F33.1 MODERATE EPISODE OF RECURRENT MAJOR DEPRESSIVE DISORDER: ICD-10-CM

## 2022-06-28 DIAGNOSIS — F41.1 GENERALIZED ANXIETY DISORDER: ICD-10-CM

## 2022-06-28 DIAGNOSIS — F51.05 INSOMNIA DUE TO MENTAL DISORDER: ICD-10-CM

## 2022-06-28 DIAGNOSIS — F43.10 POST TRAUMATIC STRESS DISORDER (PTSD): ICD-10-CM

## 2022-06-28 PROCEDURE — 99214 OFFICE O/P EST MOD 30 MIN: CPT | Performed by: NURSE PRACTITIONER

## 2022-06-28 RX ORDER — BUPROPION HYDROCHLORIDE 300 MG/1
300 TABLET ORAL EVERY MORNING
Qty: 30 TABLET | Refills: 2 | Status: SHIPPED | OUTPATIENT
Start: 2022-06-28 | End: 2022-09-29 | Stop reason: SDUPTHER

## 2022-06-28 RX ORDER — QUETIAPINE FUMARATE 200 MG/1
200 TABLET, FILM COATED ORAL NIGHTLY
Qty: 30 TABLET | Refills: 2 | Status: SHIPPED | OUTPATIENT
Start: 2022-06-28 | End: 2022-09-29 | Stop reason: SDUPTHER

## 2022-06-28 RX ORDER — PRAZOSIN HYDROCHLORIDE 5 MG/1
10 CAPSULE ORAL NIGHTLY
Qty: 60 CAPSULE | Refills: 2 | Status: SHIPPED | OUTPATIENT
Start: 2022-06-28 | End: 2022-09-23 | Stop reason: SDUPTHER

## 2022-06-28 NOTE — PROGRESS NOTES
This provider is located at [facility], [address] [place] [address], [facility], [id number] using a secure Marley Spoonhart Video Visit through [employer]. Patient is being seen remotely via telehealth at their home address in [place], and stated they are in a secure environment for this session. The patient's condition being diagnosed/treated is appropriate for telemedicine. The provider identified herself as well as her credentials.   The patient, and/or patients guardian, consent to be seen remotely, and when consent is given they understand that the consent allows for patient identifiable information to be sent to a third party as needed.   They may refuse to be seen remotely at any time. The electronic data is encrypted and password protected, and the patient and/or guardian has been advised of the potential risks to privacy not withstanding such measures.     You have chosen to receive care through a telehealth visit.  Do you consent to use a video/audio connection for your medical care today? Yes    Subjective   Delphine Mcmillan is a 61 y.o. female who presents today for follow up    Chief Complaint:  Depression, anxiety, PTSD    History of Present Illness:   Ms. Mcmillan presents today for medication management follow up via MyChart Video visit. Verbalizes that she has been doing well since last visit. Denies any adverse effects of current medication regimen. Rates anxiety 6/10 and rates depression 5/10 with 10 being the worst. Feels symptoms are manageable. Sleeping about 4-5 hours per night with intermittent nightmares. Says that she continues to have racing thoughts and finds herself waking up often during night just to check the time. Says that nightmares occur only on occasion.  Appetite is good. Denies any SI/HI or AV hallucinations.     The following portions of the patient's history were reviewed and updated as appropriate: allergies, current medications, past family history, past medical history, past social history, past  surgical history and problem list.      Past Medical History:  Past Medical History:   Diagnosis Date   • Anxiety    • Chronic pain disorder    • Depression    • Disease of thyroid gland    • Hyperlipidemia    • Type 2 diabetes mellitus (HCC)        Social History:  Social History     Socioeconomic History   • Marital status:    Tobacco Use   • Smoking status: Never Smoker   • Smokeless tobacco: Never Used   Substance and Sexual Activity   • Alcohol use: No   • Drug use: No   • Sexual activity: Defer       Family History:  Family History   Problem Relation Age of Onset   • Anxiety disorder Mother    • Depression Mother    • Anxiety disorder Sister    • Depression Sister        Past Surgical History:  History reviewed. No pertinent surgical history.    Problem List:  Patient Active Problem List   Diagnosis   • Morbidly obese (HCC)       Allergy:   Allergies   Allergen Reactions   • Codeine Hives and Itching   • Acetaminophen Rash        Current Medications:   Current Outpatient Medications   Medication Sig Dispense Refill   • buPROPion XL (WELLBUTRIN XL) 300 MG 24 hr tablet Take 1 tablet by mouth Every Morning for 90 days. 30 tablet 2   • prazosin (MINIPRESS) 5 MG capsule Take 2 capsules by mouth Every Night for 90 days. 60 capsule 2   • QUEtiapine (SEROquel) 200 MG tablet Take 1 tablet by mouth Every Night for 90 days. 30 tablet 2   • atorvastatin (LIPITOR) 80 MG tablet      • glipiZIDE XL 10 MG 24 hr tablet      • Januvia 100 MG tablet      • levothyroxine (SYNTHROID, LEVOTHROID) 100 MCG tablet Daily.     • levothyroxine (SYNTHROID, LEVOTHROID) 150 MCG tablet      • LORazepam (ATIVAN) 0.5 MG tablet Take 0.5 mg by mouth Every 8 (Eight) Hours As Needed for Anxiety. For dental work, to ease anxiety.     • omeprazole (priLOSEC) 20 MG capsule        No current facility-administered medications for this visit.       Review of Symptoms:    Review of Systems   Constitutional: Negative for fatigue.   Respiratory:  Negative for shortness of breath.    Cardiovascular: Negative for chest pain.   Psychiatric/Behavioral: Positive for sleep disturbance and depressed mood. Negative for suicidal ideas. The patient is nervous/anxious.      Physical Exam:   Physical Exam  Vitals reviewed.   Constitutional:       General: She is not in acute distress.     Appearance: Normal appearance.   Neurological:      Mental Status: She is alert.      Gait: Gait normal.       Vitals:   Due to extenuating circumstances and possible current health risks associated with the patient being present in a clinical setting (with current health restrictions in place in regards to possible COVID 19 transmission/exposure), the patient was seen remotely today via a MyChart Video Visit through [employer].  Unable to obtain vital signs due to nature of remote visit.    Mental Status Exam:   Hygiene:   good  Cooperation:  Cooperative  Eye Contact:  Fair  Psychomotor Behavior:  Appropriate  Affect:  Restricted  Mood: depressed  Hopelessness: Denies  Speech:  Minimal  Thought Process:  Linear  Thought Content:  Mood congruent  Suicidal:  None  Homicidal:  None  Hallucinations:  None  Delusion:  Paranoid  Memory:  Intact  Orientation:  Person, Place, Time and Situation  Reliability:  fair  Insight:  Fair  Judgement:  Fair  Impulse Control:  Fair    Lab Results:   Telemedicine on 03/28/2022   Component Date Value Ref Range Status   • External Amphetamine Screen Urine 03/28/2022 Negative   Final   • External Benzodiazepine Screen Uri* 03/28/2022 Negative   Final   • External Cocaine Screen Urine 03/28/2022 Negative   Final   • External THC Screen Urine 03/28/2022 Negative   Final   • External Methadone Screen Urine 03/28/2022 Negative   Final   • External Methamphetamine Screen Ur* 03/28/2022 Negative   Final   • External Oxycodone Screen Urine 03/28/2022 Negative   Final   • External Buprenorphine Screen Urine 03/28/2022 Negative   Final   • External MDMA 03/28/2022  Negative   Final   • External Opiates Screen Urine 03/28/2022 Negative   Final       EKG Results:  No orders to display       Assessment & Plan   Problems Addressed this Visit    None     Visit Diagnoses     Moderate episode of recurrent major depressive disorder (HCC)        Relevant Medications    buPROPion XL (WELLBUTRIN XL) 300 MG 24 hr tablet    QUEtiapine (SEROquel) 200 MG tablet    Post traumatic stress disorder (PTSD)        Relevant Medications    buPROPion XL (WELLBUTRIN XL) 300 MG 24 hr tablet    prazosin (MINIPRESS) 5 MG capsule    QUEtiapine (SEROquel) 200 MG tablet    Insomnia due to mental disorder        Relevant Medications    buPROPion XL (WELLBUTRIN XL) 300 MG 24 hr tablet    prazosin (MINIPRESS) 5 MG capsule    QUEtiapine (SEROquel) 200 MG tablet    Generalized anxiety disorder        Relevant Medications    buPROPion XL (WELLBUTRIN XL) 300 MG 24 hr tablet    QUEtiapine (SEROquel) 200 MG tablet      Diagnoses       Codes Comments    Moderate episode of recurrent major depressive disorder (HCC)     ICD-10-CM: F33.1  ICD-9-CM: 296.32     Post traumatic stress disorder (PTSD)     ICD-10-CM: F43.10  ICD-9-CM: 309.81     Insomnia due to mental disorder     ICD-10-CM: F51.05  ICD-9-CM: 300.9, 327.02     Generalized anxiety disorder     ICD-10-CM: F41.1  ICD-9-CM: 300.02           Visit Diagnoses:    ICD-10-CM ICD-9-CM   1. Moderate episode of recurrent major depressive disorder (HCC)  F33.1 296.32   2. Post traumatic stress disorder (PTSD)  F43.10 309.81   3. Insomnia due to mental disorder  F51.05 300.9     327.02   4. Generalized anxiety disorder  F41.1 300.02       GOALS:  Short Term Goals: Patient will be compliant with medication, and patient will have no significant medication related side effects.  Patient will be engaged in psychotherapy as indicated.  Patient will report subjective improvement of symptoms.  Long term goals: To stabilize mood and treat/improve subjective symptoms, the patient  will stay out of the hospital, the patient will be at an optimal level of functioning, and the patient will take all medications as prescribed.  The patient/guardian verbalized understanding and agreement with goals that were mutually set.    TREATMENT PLAN: Continue supportive psychotherapy efforts and medications as indicated for patient's diagnosis.  Pharmacological and Non-Pharmacological treatment options discussed during today's visit. Patient/Guardian acknowledged and verbally consented with current treatment plan and was educated on the importance of compliance with treatment and follow-up appointments.      Discussed medication options and treatment plan of prescribed medication as well as the risks, benefits, any black box warnings, and side effects including potential falls, possible impaired driving, and metabolic adversities among others. Patient is agreeable to call the office with any worsening of symptoms or onset of side effects, or if any concerns or questions arise.  The contact information for the office is made available to the patient. Patient is agreeable to call 911 or go to the nearest ER should they begin having any SI/HI, or if any urgent concerns arise. No medication side effects or related complaints today.     -Reviewed previous available documentation and reviewed most recent available labs      -SALLIE reviewed and is appropriate.    -The benefits of a healthy diet and exercise were discussed with patient, especially the positive effects they have on mental health. Patient encouraged to consider lifestyle modification regarding diet and exercise patterns to maximize results of mental health treatment.     Encouraged patient to practice good sleep hygiene.  Discussed going to bed at the same time and getting up at the same time every day. Consider a quiet activity, such as reading, part of your nighttime routine. Make your bedroom a dark, comfortable place where it is easy to fall  asleep. Avoid or limit caffeine consumption. Limit screen use, especially two hours prior to bed (this includes watching TV, using smartphone, tablet or computer).     Discussed different coping mechanisms to better control depression.  Discussed importance of counseling to decrease anxiety like symptoms. Discussed coping mechanisms to decrease stress and anxiety: relaxation techniques, guided imagery, music therapy, staying active, support groups, diversional activities and avoid aggravating factors.    Discussed in detail, the possible side effects of antipsychotic medications including increased cholesterol, increased blood sugar, and the possibility of gaining weight. Also discussed risk of muscle movement disorders with this class of medication. Labs will need to be monitored regularly while taking this type of medication.     -Continue bupropion  mg daily for depression  -Continue prazosin 10 mg nightly for nightmares  -Continue quetiapine 200 mg nightly for mood/anxiety/PTSD/paranoia    MEDS ORDERED DURING VISIT:  New Medications Ordered This Visit   Medications   • buPROPion XL (WELLBUTRIN XL) 300 MG 24 hr tablet     Sig: Take 1 tablet by mouth Every Morning for 90 days.     Dispense:  30 tablet     Refill:  2   • prazosin (MINIPRESS) 5 MG capsule     Sig: Take 2 capsules by mouth Every Night for 90 days.     Dispense:  60 capsule     Refill:  2   • QUEtiapine (SEROquel) 200 MG tablet     Sig: Take 1 tablet by mouth Every Night for 90 days.     Dispense:  30 tablet     Refill:  2       FOLLOW UP:  Return in about 3 months (around 9/28/2022) for Recheck.    I spent 30 minutes caring for Ms. Mcmillan on this date of service. This time includes time spent by me in the following activities: preparing for the visit, obtaining and/or reviewing a separately obtained history, performing a medically appropriate examination and/or evaluation, counseling and educating the patient/family/caregiver, ordering  medications, tests, or procedures and documenting information in the medical record.             This document has been electronically signed by PO Burton  June 28, 2022 09:28 EDT    Please note that portions of this note were completed with a voice recognition program. Efforts were made to edit dictation, but occasionally words are mistranscribed.

## 2022-09-23 DIAGNOSIS — F51.05 INSOMNIA DUE TO MENTAL DISORDER: ICD-10-CM

## 2022-09-23 DIAGNOSIS — F43.10 POST TRAUMATIC STRESS DISORDER (PTSD): ICD-10-CM

## 2022-09-23 RX ORDER — PRAZOSIN HYDROCHLORIDE 5 MG/1
10 CAPSULE ORAL NIGHTLY
Qty: 60 CAPSULE | Refills: 2 | Status: SHIPPED | OUTPATIENT
Start: 2022-09-23 | End: 2022-09-29 | Stop reason: SDUPTHER

## 2022-09-29 ENCOUNTER — TELEMEDICINE (OUTPATIENT)
Dept: PSYCHIATRY | Facility: CLINIC | Age: 62
End: 2022-09-29

## 2022-09-29 DIAGNOSIS — Z79.899 MEDICATION MANAGEMENT: ICD-10-CM

## 2022-09-29 DIAGNOSIS — F41.1 GENERALIZED ANXIETY DISORDER: Primary | ICD-10-CM

## 2022-09-29 DIAGNOSIS — F51.05 INSOMNIA DUE TO MENTAL DISORDER: ICD-10-CM

## 2022-09-29 DIAGNOSIS — F43.10 POST TRAUMATIC STRESS DISORDER (PTSD): ICD-10-CM

## 2022-09-29 DIAGNOSIS — F40.01 PANIC DISORDER WITH AGORAPHOBIA: ICD-10-CM

## 2022-09-29 DIAGNOSIS — F33.1 MODERATE EPISODE OF RECURRENT MAJOR DEPRESSIVE DISORDER: ICD-10-CM

## 2022-09-29 PROCEDURE — 90792 PSYCH DIAG EVAL W/MED SRVCS: CPT | Performed by: NURSE PRACTITIONER

## 2022-09-29 RX ORDER — PRAZOSIN HYDROCHLORIDE 5 MG/1
10 CAPSULE ORAL NIGHTLY
Qty: 60 CAPSULE | Refills: 2 | Status: SHIPPED | OUTPATIENT
Start: 2022-09-29 | End: 2022-12-22 | Stop reason: SDUPTHER

## 2022-09-29 RX ORDER — BUPROPION HYDROCHLORIDE 300 MG/1
300 TABLET ORAL EVERY MORNING
Qty: 30 TABLET | Refills: 2 | Status: SHIPPED | OUTPATIENT
Start: 2022-09-29 | End: 2022-12-22 | Stop reason: SDUPTHER

## 2022-09-29 RX ORDER — ESCITALOPRAM OXALATE 10 MG/1
10 TABLET ORAL NIGHTLY
Qty: 30 TABLET | Refills: 2 | Status: SHIPPED | OUTPATIENT
Start: 2022-09-29 | End: 2022-12-22 | Stop reason: SDUPTHER

## 2022-09-29 RX ORDER — QUETIAPINE FUMARATE 200 MG/1
200 TABLET, FILM COATED ORAL NIGHTLY
Qty: 30 TABLET | Refills: 2 | Status: SHIPPED | OUTPATIENT
Start: 2022-09-29 | End: 2022-12-22

## 2022-09-29 NOTE — PROGRESS NOTES
"This provider is located at the [facility], [address] [place] [address], [id number] using a secure SomaLog[employer] Video Visit through [employer]. Patient is being seen remotely via telehealth at their home address in [place], and stated they are in a secure environment for this session. The patient's condition being diagnosed/treated is appropriate for telemedicine. The provider identified herself as well as her credentials. The patient, and/or patients guardian, consent to be seen remotely, and when consent is given they understand that the consent allows for patient identifiable information to be sent to a third party as needed. They may refuse to be seen remotely at any time. The electronic data is encrypted and password protected, and the patient and/or guardian has been advised of the potential risks to privacy not withstanding such measures.    Ana Mcmillan is a 62 y.o. female who presents today for follow up    Chief Complaint:  Anxiety    History of Present Illness: This is the first encounter for this APRN with this patient.  The patient came to this APRN on their current medication regimen. Patient is a transfer of care from [facility].  She reports anxiety and depression \"are pretty much the same\". States she has adjusted to elevated anxiety and depression. States she rarely leaves her home, states her  does make her get out a few times a month to ride around.  She reports previously being prescribed methylphenidate which was helpful with focus and racing thoughts. States medication was discontinued a couple years ago. Per chart review she has been tried on several medications in the past without success. She rates anxiety 7/10; rates depression 6/10 with 10 being the worst. She reports sleep is fair with nightmares occurring 4-5 times per week. Reports recently having a lower denture placed and having difficulty adjusting to it resulting in decrease in appetite. " She denies SI/HI/AVH.      The following portions of the patient's history were reviewed and updated as appropriate: allergies, current medications, past family history, past medical history, past social history, past surgical history and problem list.      Past Medical History:  Past Medical History:   Diagnosis Date   • Anxiety    • Chronic pain disorder    • Depression    • Disease of thyroid gland    • Hyperlipidemia    • Type 2 diabetes mellitus (HCC)        Social History:  Social History     Socioeconomic History   • Marital status:    Tobacco Use   • Smoking status: Never Smoker   • Smokeless tobacco: Never Used   Substance and Sexual Activity   • Alcohol use: No   • Drug use: No   • Sexual activity: Defer       Family History:  Family History   Problem Relation Age of Onset   • Anxiety disorder Mother    • Depression Mother    • Anxiety disorder Sister    • Depression Sister        Past Surgical History:  History reviewed. No pertinent surgical history.    Problem List:  Patient Active Problem List   Diagnosis   • Morbidly obese (HCC)        Allergy:   Allergies   Allergen Reactions   • Codeine Hives and Itching   • Acetaminophen Rash        Current Medications:   Current Outpatient Medications   Medication Sig Dispense Refill   • buPROPion XL (WELLBUTRIN XL) 300 MG 24 hr tablet Take 1 tablet by mouth Every Morning for 90 days. 30 tablet 2   • prazosin (MINIPRESS) 5 MG capsule Take 2 capsules by mouth Every Night for 90 days. 60 capsule 2   • QUEtiapine (SEROquel) 200 MG tablet Take 1 tablet by mouth Every Night for 90 days. 30 tablet 2   • atorvastatin (LIPITOR) 80 MG tablet      • escitalopram (Lexapro) 10 MG tablet Take 1 tablet by mouth Every Night. 30 tablet 2   • glipiZIDE XL 10 MG 24 hr tablet      • Januvia 100 MG tablet      • levothyroxine (SYNTHROID, LEVOTHROID) 150 MCG tablet      • omeprazole (priLOSEC) 20 MG capsule        No current facility-administered medications for this visit.        Review of Symptoms:    Review of Systems   Constitutional: Positive for fatigue.   HENT: Negative.    Eyes: Negative.    Respiratory: Negative.    Cardiovascular: Negative.    Gastrointestinal: Negative.    Endocrine: Negative.    Genitourinary: Negative.    Musculoskeletal: Negative.    Skin: Negative.    Neurological: Negative.    Psychiatric/Behavioral: Positive for decreased concentration, sleep disturbance and depressed mood. Negative for suicidal ideas. The patient is nervous/anxious.          Physical Exam:   There were no vitals taken for this visit.  There is no height or weight on file to calculate BMI.    Appearance: Well nourished female, appropriately dressed, appears stated age and in no acute distress  Gait, Station, Strength: Unable to evaluate    Mental Status Exam:   Hygiene:   good  Cooperation:  Cooperative  Eye Contact:  Good  Psychomotor Behavior:  Appropriate  Affect:  Appropriate  Mood: anxious  Hopelessness: 7  Speech:  Normal  Thought Process:  Linear  Thought Content:  Mood congruent  Suicidal:  None  Homicidal:  None  Hallucinations:  None  Delusion:  None  Memory:  Intact  Orientation:  Person, Place, Time and Situation  Reliability:  good  Insight:  Good  Judgement:  Good  Impulse Control:  Good  Physical/Medical Issues:  Yes DM     PHQ-Score Total:  PHQ-9 Total Score: 12   Patient screened positive for depression based on a PHQ-9 score of 12 on 9/29/2022. Follow-up recommendations include: Prescribed antidepressant medication treatment and Suicide Risk Assessment performed.          Lab Results:   No visits with results within 1 Month(s) from this visit.   Latest known visit with results is:   Telemedicine on 03/28/2022   Component Date Value Ref Range Status   • External Amphetamine Screen Urine 03/28/2022 Negative   Final   • External Benzodiazepine Screen Uri* 03/28/2022 Negative   Final   • External Cocaine Screen Urine 03/28/2022 Negative   Final   • External THC Screen  Urine 03/28/2022 Negative   Final   • External Methadone Screen Urine 03/28/2022 Negative   Final   • External Methamphetamine Screen Ur* 03/28/2022 Negative   Final   • External Oxycodone Screen Urine 03/28/2022 Negative   Final   • External Buprenorphine Screen Urine 03/28/2022 Negative   Final   • External MDMA 03/28/2022 Negative   Final   • External Opiates Screen Urine 03/28/2022 Negative   Final       Assessment & Plan   Diagnoses and all orders for this visit:    1. Generalized anxiety disorder (Primary)  -     QUEtiapine (SEROquel) 200 MG tablet; Take 1 tablet by mouth Every Night for 90 days.  Dispense: 30 tablet; Refill: 2  -     escitalopram (Lexapro) 10 MG tablet; Take 1 tablet by mouth Every Night.  Dispense: 30 tablet; Refill: 2    2. Moderate episode of recurrent major depressive disorder (HCC)  -     buPROPion XL (WELLBUTRIN XL) 300 MG 24 hr tablet; Take 1 tablet by mouth Every Morning for 90 days.  Dispense: 30 tablet; Refill: 2  -     escitalopram (Lexapro) 10 MG tablet; Take 1 tablet by mouth Every Night.  Dispense: 30 tablet; Refill: 2    3. Post traumatic stress disorder (PTSD)  -     QUEtiapine (SEROquel) 200 MG tablet; Take 1 tablet by mouth Every Night for 90 days.  Dispense: 30 tablet; Refill: 2  -     prazosin (MINIPRESS) 5 MG capsule; Take 2 capsules by mouth Every Night for 90 days.  Dispense: 60 capsule; Refill: 2    4. Insomnia due to mental disorder  -     QUEtiapine (SEROquel) 200 MG tablet; Take 1 tablet by mouth Every Night for 90 days.  Dispense: 30 tablet; Refill: 2  -     prazosin (MINIPRESS) 5 MG capsule; Take 2 capsules by mouth Every Night for 90 days.  Dispense: 60 capsule; Refill: 2    5. Medication management    6. Panic disorder with agoraphobia          -Chart review reflects she has been tried on several medications but minimal SSRIs. Will start escitalopram to improve anxiety  -Begin escitalopram 10 mg nightly for anxiety and depression  -Continue bupropion  mg  daily for depression  -Continue quetiapine 200 mg nightly for mood and sleep. Lengthy discussion with patient on the possible side effects of antipsychotic medications including increased cholesterol, increased blood sugar, and possibility of weight gain.  Also discussed the need to monitor lab work associated with this.  The risk of muscle movement disorders with this class of medication was also discussed.  -Continue prazosin 10 mg nightly for nightmares  -Encouraged patient to begin therapy  -SALLIE reviewed and appropriate. Patient counseled on use of controlled substances.   -The benefits of a healthy diet and exercise were discussed with patient, especially the positive effects they have on mental health. Patient encouraged to consider lifestyle modification regarding  diet and exercise patterns to maximize results of mental health treatment.  -Reviewed previous available documentation  -Reviewed most recent available labs       Visit Diagnoses:    ICD-10-CM ICD-9-CM   1. Generalized anxiety disorder  F41.1 300.02   2. Moderate episode of recurrent major depressive disorder (HCC)  F33.1 296.32   3. Post traumatic stress disorder (PTSD)  F43.10 309.81   4. Insomnia due to mental disorder  F51.05 300.9     327.02   5. Medication management  Z79.899 V58.69   6. Panic disorder with agoraphobia  F40.01 300.21       TREATMENT PLAN/GOALS: Continue supportive psychotherapy efforts and medications as indicated. Treatment and medication options discussed during today's visit. Patient acknowledged and verbally consented to continue with current treatment plan and was educated on the importance of compliance with treatment and follow-up appointments.    MEDICATION ISSUES:    Discussed medication options and treatment plan of prescribed medication as well as the risks, benefits, and side effects including potential falls, possible impaired driving and metabolic adversities among others. Patient is agreeable to call the  office with any worsening of symptoms or onset of side effects. Patient is agreeable to call 911 or go to the nearest ER should he/she begin having SI/HI.     MEDS ORDERED DURING VISIT:  New Medications Ordered This Visit   Medications   • buPROPion XL (WELLBUTRIN XL) 300 MG 24 hr tablet     Sig: Take 1 tablet by mouth Every Morning for 90 days.     Dispense:  30 tablet     Refill:  2   • QUEtiapine (SEROquel) 200 MG tablet     Sig: Take 1 tablet by mouth Every Night for 90 days.     Dispense:  30 tablet     Refill:  2   • prazosin (MINIPRESS) 5 MG capsule     Sig: Take 2 capsules by mouth Every Night for 90 days.     Dispense:  60 capsule     Refill:  2   • escitalopram (Lexapro) 10 MG tablet     Sig: Take 1 tablet by mouth Every Night.     Dispense:  30 tablet     Refill:  2       Return in about 8 weeks (around 11/24/2022), or if symptoms worsen or fail to improve.               This document has been electronically signed by PO Madden  September 29, 2022 10:25 EDT    Part of this note may be an electronic transcription/translation of spoken language to printed text using the Dragon Dictation System.

## 2022-12-22 DIAGNOSIS — F41.1 GENERALIZED ANXIETY DISORDER: ICD-10-CM

## 2022-12-22 DIAGNOSIS — F51.05 INSOMNIA DUE TO MENTAL DISORDER: ICD-10-CM

## 2022-12-22 DIAGNOSIS — F33.1 MODERATE EPISODE OF RECURRENT MAJOR DEPRESSIVE DISORDER: ICD-10-CM

## 2022-12-22 DIAGNOSIS — F43.10 POST TRAUMATIC STRESS DISORDER (PTSD): ICD-10-CM

## 2022-12-22 RX ORDER — PRAZOSIN HYDROCHLORIDE 5 MG/1
10 CAPSULE ORAL NIGHTLY
Qty: 60 CAPSULE | Refills: 2 | Status: SHIPPED | OUTPATIENT
Start: 2022-12-22 | End: 2023-02-14 | Stop reason: SDUPTHER

## 2022-12-22 RX ORDER — ESCITALOPRAM OXALATE 10 MG/1
10 TABLET ORAL NIGHTLY
Qty: 30 TABLET | Refills: 2 | Status: SHIPPED | OUTPATIENT
Start: 2022-12-22 | End: 2023-02-14 | Stop reason: SDUPTHER

## 2022-12-22 RX ORDER — QUETIAPINE FUMARATE 200 MG/1
TABLET, FILM COATED ORAL
Qty: 30 TABLET | Refills: 2 | Status: SHIPPED | OUTPATIENT
Start: 2022-12-22 | End: 2023-02-14 | Stop reason: SDUPTHER

## 2022-12-22 RX ORDER — BUPROPION HYDROCHLORIDE 300 MG/1
300 TABLET ORAL EVERY MORNING
Qty: 30 TABLET | Refills: 2 | Status: SHIPPED | OUTPATIENT
Start: 2022-12-22 | End: 2023-02-14 | Stop reason: SDUPTHER

## 2023-02-14 ENCOUNTER — TELEMEDICINE (OUTPATIENT)
Dept: PSYCHIATRY | Facility: CLINIC | Age: 63
End: 2023-02-14
Payer: MEDICARE

## 2023-02-14 DIAGNOSIS — F43.10 POST TRAUMATIC STRESS DISORDER (PTSD): ICD-10-CM

## 2023-02-14 DIAGNOSIS — Z79.899 MEDICATION MANAGEMENT: ICD-10-CM

## 2023-02-14 DIAGNOSIS — F51.05 INSOMNIA DUE TO MENTAL DISORDER: ICD-10-CM

## 2023-02-14 DIAGNOSIS — F41.1 GENERALIZED ANXIETY DISORDER: ICD-10-CM

## 2023-02-14 DIAGNOSIS — F33.1 MODERATE EPISODE OF RECURRENT MAJOR DEPRESSIVE DISORDER: ICD-10-CM

## 2023-02-14 DIAGNOSIS — F40.01 PANIC DISORDER WITH AGORAPHOBIA: Primary | ICD-10-CM

## 2023-02-14 PROCEDURE — 99214 OFFICE O/P EST MOD 30 MIN: CPT | Performed by: NURSE PRACTITIONER

## 2023-02-14 RX ORDER — QUETIAPINE FUMARATE 200 MG/1
200 TABLET, FILM COATED ORAL
Qty: 90 TABLET | Refills: 0 | Status: SHIPPED | OUTPATIENT
Start: 2023-02-14

## 2023-02-14 RX ORDER — PRAZOSIN HYDROCHLORIDE 5 MG/1
10 CAPSULE ORAL NIGHTLY
Qty: 180 CAPSULE | Refills: 0 | Status: SHIPPED | OUTPATIENT
Start: 2023-02-14 | End: 2023-05-15

## 2023-02-14 RX ORDER — BUPROPION HYDROCHLORIDE 300 MG/1
300 TABLET ORAL EVERY MORNING
Qty: 90 TABLET | Refills: 0 | Status: SHIPPED | OUTPATIENT
Start: 2023-02-14 | End: 2023-05-15

## 2023-02-14 RX ORDER — ESCITALOPRAM OXALATE 10 MG/1
15 TABLET ORAL NIGHTLY
Qty: 135 TABLET | Refills: 0 | Status: SHIPPED | OUTPATIENT
Start: 2023-02-14

## 2023-02-14 NOTE — PROGRESS NOTES
This provider is located at the [facility], [address] [place] [address], [phone] using a secure [employer] Video Visit through [employer]. Patient is being seen remotely via telehealth at their home address in [place], and stated they are in a secure environment for this session. The patient's condition being diagnosed/treated is appropriate for telemedicine. The provider identified herself as well as her credentials. The patient, and/or patients guardian, consent to be seen remotely, and when consent is given they understand that the consent allows for patient identifiable information to be sent to a third party as needed. They may refuse to be seen remotely at any time. The electronic data is encrypted and password protected, and the patient and/or guardian has been advised of the potential risks to privacy not withstanding such measures.    Ana Mcmillan is a 62 y.o. female who presents today for follow up    Chief Complaint:  Anxiety, depression    History of Present Illness: Patient presents as follow up via telehealth. She reports escitalopram has significantly improved anxiety and depression. She denies any side effects. States she has been depressed recently due to the passing of her younger brother unexpectedly. States he suffered with alcoholism and it was believed it played a role in his death. States she has stopped communication with him due to his anger outbursts and behavior when he was drinking ETOH. Reports feeling guilty. States considering the circumstances she feels medication has been very helpful.   She rates anxiety 6/10; rates depression 6/10 with 10 being the worst. She reports sleep has improved as well as nightmares. Reports appetite is good, denies any weight changes. She denies SI/HI/AVH.    The following portions of the patient's history were reviewed and updated as appropriate: allergies, current medications, past family history, past medical history,  past social history, past surgical history and problem list.      Past Medical History:  Past Medical History:   Diagnosis Date   • Anxiety    • Chronic pain disorder    • Depression    • Disease of thyroid gland    • Hyperlipidemia    • Type 2 diabetes mellitus (HCC)        Social History:  Social History     Socioeconomic History   • Marital status:    Tobacco Use   • Smoking status: Never   • Smokeless tobacco: Never   Substance and Sexual Activity   • Alcohol use: No   • Drug use: No   • Sexual activity: Defer       Family History:  Family History   Problem Relation Age of Onset   • Anxiety disorder Mother    • Depression Mother    • Anxiety disorder Sister    • Depression Sister        Past Surgical History:  History reviewed. No pertinent surgical history.    Problem List:  Patient Active Problem List   Diagnosis   • Morbidly obese (HCC)        Allergy:   Allergies   Allergen Reactions   • Codeine Hives and Itching   • Acetaminophen Rash        Current Medications:   Current Outpatient Medications   Medication Sig Dispense Refill   • atorvastatin (LIPITOR) 80 MG tablet      • buPROPion XL (WELLBUTRIN XL) 300 MG 24 hr tablet Take 1 tablet by mouth Every Morning for 90 days. 90 tablet 0   • escitalopram (Lexapro) 10 MG tablet Take 1.5 tablets by mouth Every Night. 135 tablet 0   • glipiZIDE XL 10 MG 24 hr tablet      • levothyroxine (SYNTHROID, LEVOTHROID) 150 MCG tablet      • omeprazole (priLOSEC) 20 MG capsule      • prazosin (MINIPRESS) 5 MG capsule Take 2 capsules by mouth Every Night for 90 days. 180 capsule 0   • QUEtiapine (SEROquel) 200 MG tablet Take 1 tablet by mouth every night at bedtime. 90 tablet 0     No current facility-administered medications for this visit.       Review of Symptoms:    Review of Systems   Constitutional: Positive for fatigue.   HENT: Negative.    Eyes: Negative.    Respiratory: Negative.    Cardiovascular: Negative.    Gastrointestinal: Negative.    Genitourinary:  Negative.    Musculoskeletal: Negative.    Skin: Negative.    Neurological: Negative.    Psychiatric/Behavioral: Positive for depressed mood and stress. Negative for suicidal ideas. The patient is nervous/anxious.          Physical Exam:   There were no vitals taken for this visit.  There is no height or weight on file to calculate BMI.    Appearance: Well nourished female, appropriately dressed, appears stated age and in no acute distress  Gait, Station, Strength: WNL    Mental Status Exam:   Hygiene:   good  Cooperation:  Cooperative  Eye Contact:  Good  Psychomotor Behavior:  Appropriate  Affect:  Appropriate  Mood: anxious  Hopelessness: 2  Speech:  Normal  Thought Process:  Linear  Thought Content:  Normal and Mood congruent  Suicidal:  None  Homicidal:  None  Hallucinations:  None  Delusion:  None  Memory:  Intact  Orientation:  Person, Place, Time and Situation  Reliability:  good  Insight:  Good  Judgement:  Good  Impulse Control:  Good  Physical/Medical Issues:  see med hx     PHQ-Score Total:  PHQ-9 Total Score: 4   Patient screened positive for depression based on a PHQ-9 score of 4 on 2/14/2023. Follow-up recommendations include: Prescribed antidepressant medication treatment and Suicide Risk Assessment performed.          Lab Results:   No visits with results within 1 Month(s) from this visit.   Latest known visit with results is:   Telemedicine on 03/28/2022   Component Date Value Ref Range Status   • External Amphetamine Screen Urine 03/28/2022 Negative   Final   • External Benzodiazepine Screen Uri* 03/28/2022 Negative   Final   • External Cocaine Screen Urine 03/28/2022 Negative   Final   • External THC Screen Urine 03/28/2022 Negative   Final   • External Methadone Screen Urine 03/28/2022 Negative   Final   • External Methamphetamine Screen Ur* 03/28/2022 Negative   Final   • External Oxycodone Screen Urine 03/28/2022 Negative   Final   • External Buprenorphine Screen Urine 03/28/2022 Negative    Final   • External MDMA 03/28/2022 Negative   Final   • External Opiates Screen Urine 03/28/2022 Negative   Final       Assessment & Plan   Diagnoses and all orders for this visit:    1. Panic disorder with agoraphobia (Primary)  -     escitalopram (Lexapro) 10 MG tablet; Take 1.5 tablets by mouth Every Night.  Dispense: 135 tablet; Refill: 0    2. Generalized anxiety disorder  -     escitalopram (Lexapro) 10 MG tablet; Take 1.5 tablets by mouth Every Night.  Dispense: 135 tablet; Refill: 0  -     QUEtiapine (SEROquel) 200 MG tablet; Take 1 tablet by mouth every night at bedtime.  Dispense: 90 tablet; Refill: 0    3. Moderate episode of recurrent major depressive disorder (HCC)  -     escitalopram (Lexapro) 10 MG tablet; Take 1.5 tablets by mouth Every Night.  Dispense: 135 tablet; Refill: 0  -     buPROPion XL (WELLBUTRIN XL) 300 MG 24 hr tablet; Take 1 tablet by mouth Every Morning for 90 days.  Dispense: 90 tablet; Refill: 0    4. Post traumatic stress disorder (PTSD)  -     prazosin (MINIPRESS) 5 MG capsule; Take 2 capsules by mouth Every Night for 90 days.  Dispense: 180 capsule; Refill: 0  -     QUEtiapine (SEROquel) 200 MG tablet; Take 1 tablet by mouth every night at bedtime.  Dispense: 90 tablet; Refill: 0    5. Insomnia due to mental disorder  -     prazosin (MINIPRESS) 5 MG capsule; Take 2 capsules by mouth Every Night for 90 days.  Dispense: 180 capsule; Refill: 0  -     QUEtiapine (SEROquel) 200 MG tablet; Take 1 tablet by mouth every night at bedtime.  Dispense: 90 tablet; Refill: 0    6. Medication management        -Increase escitalopram 15 mg nightly for anxiety and depression  -Continue bupropion  mg daily for depression  -Continue quetiapine 200 mg nightly for mood and sleep. Lengthy discussion with patient on the possible side effects of antipsychotic medications including increased cholesterol, increased blood sugar, and possibility of weight gain.  Also discussed the need to monitor lab  work associated with this.  The risk of muscle movement disorders with this class of medication was also discussed.  -Continue prazosin 10 mg nightly for nightmares  -Encouraged patient to begin therapy  -SALLIE reviewed and appropriate. Patient counseled on use of controlled substances.   -The benefits of a healthy diet and exercise were discussed with patient, especially the positive effects they have on mental health. Patient encouraged to consider lifestyle modification regarding  diet and exercise patterns to maximize results of mental health treatment.  -Reviewed previous available documentation  -Reviewed most recent available labs       Visit Diagnoses:    ICD-10-CM ICD-9-CM   1. Panic disorder with agoraphobia  F40.01 300.21   2. Generalized anxiety disorder  F41.1 300.02   3. Moderate episode of recurrent major depressive disorder (HCC)  F33.1 296.32   4. Post traumatic stress disorder (PTSD)  F43.10 309.81   5. Insomnia due to mental disorder  F51.05 300.9     327.02   6. Medication management  Z79.899 V58.69       TREATMENT PLAN/GOALS: Continue supportive psychotherapy efforts and medications as indicated. Treatment and medication options discussed during today's visit. Patient acknowledged and verbally consented to continue with current treatment plan and was educated on the importance of compliance with treatment and follow-up appointments.    MEDICATION ISSUES:    Discussed medication options and treatment plan of prescribed medication as well as the risks, benefits, and side effects including potential falls, possible impaired driving and metabolic adversities among others. Patient is agreeable to call the office with any worsening of symptoms or onset of side effects. Patient is agreeable to call 911 or go to the nearest ER should he/she begin having SI/HI.     MEDS ORDERED DURING VISIT:  New Medications Ordered This Visit   Medications   • escitalopram (Lexapro) 10 MG tablet     Sig: Take 1.5  tablets by mouth Every Night.     Dispense:  135 tablet     Refill:  0   • buPROPion XL (WELLBUTRIN XL) 300 MG 24 hr tablet     Sig: Take 1 tablet by mouth Every Morning for 90 days.     Dispense:  90 tablet     Refill:  0   • prazosin (MINIPRESS) 5 MG capsule     Sig: Take 2 capsules by mouth Every Night for 90 days.     Dispense:  180 capsule     Refill:  0   • QUEtiapine (SEROquel) 200 MG tablet     Sig: Take 1 tablet by mouth every night at bedtime.     Dispense:  90 tablet     Refill:  0       Return in about 3 months (around 5/14/2023), or if symptoms worsen or fail to improve.               This document has been electronically signed by PO Madden  February 14, 2023 11:22 EST    Part of this note may be an electronic transcription/translation of spoken language to printed text using the Dragon Dictation System.

## 2023-05-15 ENCOUNTER — TELEMEDICINE (OUTPATIENT)
Dept: PSYCHIATRY | Facility: CLINIC | Age: 63
End: 2023-05-15
Payer: MEDICARE

## 2023-05-15 DIAGNOSIS — F41.1 GENERALIZED ANXIETY DISORDER: Primary | ICD-10-CM

## 2023-05-15 DIAGNOSIS — F43.10 POST TRAUMATIC STRESS DISORDER (PTSD): ICD-10-CM

## 2023-05-15 DIAGNOSIS — F33.1 MODERATE EPISODE OF RECURRENT MAJOR DEPRESSIVE DISORDER: ICD-10-CM

## 2023-05-15 DIAGNOSIS — Z79.899 MEDICATION MANAGEMENT: ICD-10-CM

## 2023-05-15 DIAGNOSIS — F40.01 PANIC DISORDER WITH AGORAPHOBIA: ICD-10-CM

## 2023-05-15 DIAGNOSIS — F51.05 INSOMNIA DUE TO MENTAL DISORDER: ICD-10-CM

## 2023-05-15 PROCEDURE — 99214 OFFICE O/P EST MOD 30 MIN: CPT | Performed by: NURSE PRACTITIONER

## 2023-05-15 PROCEDURE — 1159F MED LIST DOCD IN RCRD: CPT | Performed by: NURSE PRACTITIONER

## 2023-05-15 PROCEDURE — 1160F RVW MEDS BY RX/DR IN RCRD: CPT | Performed by: NURSE PRACTITIONER

## 2023-05-15 RX ORDER — PRAZOSIN HYDROCHLORIDE 5 MG/1
10 CAPSULE ORAL NIGHTLY
Qty: 180 CAPSULE | Refills: 0 | Status: SHIPPED | OUTPATIENT
Start: 2023-05-15 | End: 2023-08-13

## 2023-05-15 RX ORDER — QUETIAPINE FUMARATE 200 MG/1
200 TABLET, FILM COATED ORAL
Qty: 90 TABLET | Refills: 0 | Status: SHIPPED | OUTPATIENT
Start: 2023-05-15

## 2023-05-15 RX ORDER — ESCITALOPRAM OXALATE 10 MG/1
15 TABLET ORAL NIGHTLY
Qty: 135 TABLET | Refills: 0 | Status: SHIPPED | OUTPATIENT
Start: 2023-05-15

## 2023-05-15 RX ORDER — BUPROPION HYDROCHLORIDE 300 MG/1
300 TABLET ORAL EVERY MORNING
Qty: 90 TABLET | Refills: 0 | Status: SHIPPED | OUTPATIENT
Start: 2023-05-15 | End: 2023-08-13

## 2023-05-15 NOTE — PROGRESS NOTES
This provider is located at the [facility], [address] [place] [address], [id number] using a secure [employer] Video Visit through [employer]. Patient is being seen remotely via telehealth at their home address in [place], and stated they are in a secure environment for this session. The patient's condition being diagnosed/treated is appropriate for telemedicine. The provider identified herself as well as her credentials. The patient, and/or patients guardian, consent to be seen remotely, and when consent is given they understand that the consent allows for patient identifiable information to be sent to a third party as needed. They may refuse to be seen remotely at any time. The electronic data is encrypted and password protected, and the patient and/or guardian has been advised of the potential risks to privacy not withstanding such measures.    Ana Mcmillan is a 62 y.o. female who presents today for follow up    Chief Complaint:  Anxiety, depression    History of Present Illness: Patient presents as follow-up via televisit.  She reports improvement with anxiety and depressive symptoms since beginning escitalopram.  States that she has been stressed recently due to her son leaving his family without telling anyone.  States once he was located he informed her he has been struggling with alcoholism which contributed to her brother's death last December.  States he is currently in treatment which does lessen the anxiety.  She reports sleep is good, reports nightmares are nonexistent.  Reports appetite is good, denies any significant weight changes.  She denies SI/HI/AVH.    The following portions of the patient's history were reviewed and updated as appropriate: allergies, current medications, past family history, past medical history, past social history, past surgical history and problem list.      Past Medical History:  Past Medical History:   Diagnosis Date   • Anxiety    • Chronic  pain disorder    • Depression    • Disease of thyroid gland    • Hyperlipidemia    • Type 2 diabetes mellitus        Social History:  Social History     Socioeconomic History   • Marital status:    Tobacco Use   • Smoking status: Never   • Smokeless tobacco: Never   Substance and Sexual Activity   • Alcohol use: No   • Drug use: No   • Sexual activity: Defer       Family History:  Family History   Problem Relation Age of Onset   • Anxiety disorder Mother    • Depression Mother    • Anxiety disorder Sister    • Depression Sister        Past Surgical History:  History reviewed. No pertinent surgical history.    Problem List:  Patient Active Problem List   Diagnosis   • Morbidly obese        Allergy:   Allergies   Allergen Reactions   • Codeine Hives and Itching   • Acetaminophen Rash        Current Medications:   Current Outpatient Medications   Medication Sig Dispense Refill   • atorvastatin (LIPITOR) 80 MG tablet      • buPROPion XL (WELLBUTRIN XL) 300 MG 24 hr tablet Take 1 tablet by mouth Every Morning for 90 days. 90 tablet 0   • escitalopram (Lexapro) 10 MG tablet Take 1.5 tablets by mouth Every Night. 135 tablet 0   • glipiZIDE XL 10 MG 24 hr tablet      • levothyroxine (SYNTHROID, LEVOTHROID) 150 MCG tablet      • omeprazole (priLOSEC) 20 MG capsule      • prazosin (MINIPRESS) 5 MG capsule Take 2 capsules by mouth Every Night for 90 days. 180 capsule 0   • QUEtiapine (SEROquel) 200 MG tablet Take 1 tablet by mouth every night at bedtime. 90 tablet 0     No current facility-administered medications for this visit.       Review of Symptoms:    Review of Systems   Constitutional: Positive for fatigue.   HENT: Negative.    Eyes: Negative.    Respiratory: Negative.    Cardiovascular: Negative.    Gastrointestinal: Negative.    Neurological: Negative.    Psychiatric/Behavioral: Positive for depressed mood and stress. Negative for suicidal ideas. The patient is nervous/anxious.          Physical Exam:   There  were no vitals taken for this visit.  There is no height or weight on file to calculate BMI.    Appearance: Well nourished female, appropriately dressed, appears stated age and in no acute distress  Gait, Station, Strength: FAVIAN    Mental Status Exam:   Hygiene:   good  Cooperation:  Cooperative  Eye Contact:  Good  Psychomotor Behavior:  Appropriate  Affect:  Appropriate  Mood: anxious  Hopelessness: 4  Speech:  Normal  Thought Process:  Linear  Thought Content:  Mood congruent  Suicidal:  None  Homicidal:  None  Hallucinations:  None  Delusion:  None  Memory:  Intact  Orientation:  Person, Place, Time and Situation  Reliability:  good  Insight:  Good  Judgement:  Good  Impulse Control:  Good  Physical/Medical Issues:  See med hx     PHQ-Score Total:  PHQ-9 Total Score: 3           Lab Results:   No visits with results within 1 Month(s) from this visit.   Latest known visit with results is:   Telemedicine on 03/28/2022   Component Date Value Ref Range Status   • External Amphetamine Screen Urine 03/28/2022 Negative   Final   • External Benzodiazepine Screen Uri* 03/28/2022 Negative   Final   • External Cocaine Screen Urine 03/28/2022 Negative   Final   • External THC Screen Urine 03/28/2022 Negative   Final   • External Methadone Screen Urine 03/28/2022 Negative   Final   • External Methamphetamine Screen Ur* 03/28/2022 Negative   Final   • External Oxycodone Screen Urine 03/28/2022 Negative   Final   • External Buprenorphine Screen Urine 03/28/2022 Negative   Final   • External MDMA 03/28/2022 Negative   Final   • External Opiates Screen Urine 03/28/2022 Negative   Final       Assessment & Plan   Diagnoses and all orders for this visit:    1. Generalized anxiety disorder (Primary)  -     escitalopram (Lexapro) 10 MG tablet; Take 1.5 tablets by mouth Every Night.  Dispense: 135 tablet; Refill: 0  -     QUEtiapine (SEROquel) 200 MG tablet; Take 1 tablet by mouth every night at bedtime.  Dispense: 90 tablet; Refill:  0    2. Moderate episode of recurrent major depressive disorder  -     buPROPion XL (WELLBUTRIN XL) 300 MG 24 hr tablet; Take 1 tablet by mouth Every Morning for 90 days.  Dispense: 90 tablet; Refill: 0  -     escitalopram (Lexapro) 10 MG tablet; Take 1.5 tablets by mouth Every Night.  Dispense: 135 tablet; Refill: 0    3. Post traumatic stress disorder (PTSD)  -     prazosin (MINIPRESS) 5 MG capsule; Take 2 capsules by mouth Every Night for 90 days.  Dispense: 180 capsule; Refill: 0  -     QUEtiapine (SEROquel) 200 MG tablet; Take 1 tablet by mouth every night at bedtime.  Dispense: 90 tablet; Refill: 0    4. Insomnia due to mental disorder  -     prazosin (MINIPRESS) 5 MG capsule; Take 2 capsules by mouth Every Night for 90 days.  Dispense: 180 capsule; Refill: 0  -     QUEtiapine (SEROquel) 200 MG tablet; Take 1 tablet by mouth every night at bedtime.  Dispense: 90 tablet; Refill: 0    5. Panic disorder with agoraphobia  -     escitalopram (Lexapro) 10 MG tablet; Take 1.5 tablets by mouth Every Night.  Dispense: 135 tablet; Refill: 0    6. Medication management        -Continue escitalopram 15 mg nightly for anxiety and depression  -Continue bupropion  mg daily for depression  -Continue quetiapine 200 mg nightly for mood and sleep. Lengthy discussion with patient on the possible side effects of antipsychotic medications including increased cholesterol, increased blood sugar, and possibility of weight gain.  Also discussed the need to monitor lab work associated with this.  The risk of muscle movement disorders with this class of medication was also discussed.  -Continue prazosin 10 mg nightly for nightmares  -Encouraged patient to begin therapy  -SALLIE reviewed and appropriate. Patient counseled on use of controlled substances.   -The benefits of a healthy diet and exercise were discussed with patient, especially the positive effects they have on mental health. Patient encouraged to consider lifestyle  modification regarding  diet and exercise patterns to maximize results of mental health treatment.  -Reviewed previous available documentation  -Reviewed most recent available labs       Visit Diagnoses:    ICD-10-CM ICD-9-CM   1. Generalized anxiety disorder  F41.1 300.02   2. Moderate episode of recurrent major depressive disorder  F33.1 296.32   3. Post traumatic stress disorder (PTSD)  F43.10 309.81   4. Insomnia due to mental disorder  F51.05 300.9     327.02   5. Panic disorder with agoraphobia  F40.01 300.21   6. Medication management  Z79.899 V58.69       TREATMENT PLAN/GOALS: Continue supportive psychotherapy efforts and medications as indicated. Treatment and medication options discussed during today's visit. Patient acknowledged and verbally consented to continue with current treatment plan and was educated on the importance of compliance with treatment and follow-up appointments.    MEDICATION ISSUES:    Discussed medication options and treatment plan of prescribed medication as well as the risks, benefits, and side effects including potential falls, possible impaired driving and metabolic adversities among others. Patient is agreeable to call the office with any worsening of symptoms or onset of side effects. Patient is agreeable to call 911 or go to the nearest ER should he/she begin having SI/HI.     MEDS ORDERED DURING VISIT:  New Medications Ordered This Visit   Medications   • buPROPion XL (WELLBUTRIN XL) 300 MG 24 hr tablet     Sig: Take 1 tablet by mouth Every Morning for 90 days.     Dispense:  90 tablet     Refill:  0   • escitalopram (Lexapro) 10 MG tablet     Sig: Take 1.5 tablets by mouth Every Night.     Dispense:  135 tablet     Refill:  0   • prazosin (MINIPRESS) 5 MG capsule     Sig: Take 2 capsules by mouth Every Night for 90 days.     Dispense:  180 capsule     Refill:  0   • QUEtiapine (SEROquel) 200 MG tablet     Sig: Take 1 tablet by mouth every night at bedtime.     Dispense:  90  tablet     Refill:  0       Return in about 3 months (around 8/15/2023), or if symptoms worsen or fail to improve.               This document has been electronically signed by PO Madden  May 15, 2023 13:42 EDT    Part of this note may be an electronic transcription/translation of spoken language to printed text using the Dragon Dictation System.
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
## 2023-08-07 DIAGNOSIS — F41.1 GENERALIZED ANXIETY DISORDER: ICD-10-CM

## 2023-08-07 DIAGNOSIS — F33.1 MODERATE EPISODE OF RECURRENT MAJOR DEPRESSIVE DISORDER: ICD-10-CM

## 2023-08-07 DIAGNOSIS — F40.01 PANIC DISORDER WITH AGORAPHOBIA: ICD-10-CM

## 2023-08-07 RX ORDER — ESCITALOPRAM OXALATE 10 MG/1
TABLET ORAL
Qty: 135 TABLET | Refills: 0 | Status: SHIPPED | OUTPATIENT
Start: 2023-08-07

## 2023-08-15 ENCOUNTER — TELEMEDICINE (OUTPATIENT)
Dept: PSYCHIATRY | Facility: CLINIC | Age: 63
End: 2023-08-15
Payer: MEDICARE

## 2023-08-15 DIAGNOSIS — F41.1 GENERALIZED ANXIETY DISORDER: Primary | ICD-10-CM

## 2023-08-15 DIAGNOSIS — Z79.899 MEDICATION MANAGEMENT: ICD-10-CM

## 2023-08-15 DIAGNOSIS — F40.01 PANIC DISORDER WITH AGORAPHOBIA: ICD-10-CM

## 2023-08-15 DIAGNOSIS — F43.10 POST TRAUMATIC STRESS DISORDER (PTSD): ICD-10-CM

## 2023-08-15 DIAGNOSIS — F51.05 INSOMNIA DUE TO MENTAL DISORDER: ICD-10-CM

## 2023-08-15 DIAGNOSIS — F33.1 MODERATE EPISODE OF RECURRENT MAJOR DEPRESSIVE DISORDER: ICD-10-CM

## 2023-08-15 PROCEDURE — 1159F MED LIST DOCD IN RCRD: CPT | Performed by: NURSE PRACTITIONER

## 2023-08-15 PROCEDURE — 1160F RVW MEDS BY RX/DR IN RCRD: CPT | Performed by: NURSE PRACTITIONER

## 2023-08-15 PROCEDURE — 99214 OFFICE O/P EST MOD 30 MIN: CPT | Performed by: NURSE PRACTITIONER

## 2023-08-15 RX ORDER — QUETIAPINE FUMARATE 200 MG/1
200 TABLET, FILM COATED ORAL
Qty: 90 TABLET | Refills: 0 | Status: SHIPPED | OUTPATIENT
Start: 2023-08-15

## 2023-08-15 RX ORDER — ESCITALOPRAM OXALATE 10 MG/1
15 TABLET ORAL DAILY
Qty: 135 TABLET | Refills: 0 | Status: SHIPPED | OUTPATIENT
Start: 2023-08-15

## 2023-08-15 RX ORDER — BUPROPION HYDROCHLORIDE 300 MG/1
300 TABLET ORAL EVERY MORNING
Qty: 90 TABLET | Refills: 0 | Status: SHIPPED | OUTPATIENT
Start: 2023-08-15 | End: 2023-11-13

## 2023-08-15 RX ORDER — PRAZOSIN HYDROCHLORIDE 5 MG/1
10 CAPSULE ORAL NIGHTLY
Qty: 180 CAPSULE | Refills: 0 | Status: SHIPPED | OUTPATIENT
Start: 2023-08-15 | End: 2023-11-13

## 2023-08-15 NOTE — PROGRESS NOTES
This provider is located at the [facility], [address] [place] [address], [id number] using a secure [employer] Video Visit through [employer]. Patient is being seen remotely via telehealth at their home address in [place], and stated they are in a secure environment for this session. The patient's condition being diagnosed/treated is appropriate for telemedicine. The provider identified herself as well as her credentials.   The patient, and/or patients guardian, consent to be seen remotely, and when consent is given they understand that the consent allows for patient identifiable information to be sent to a third party as needed.   They may refuse to be seen remotely at any time. The electronic data is encrypted and password protected, and the patient and/or guardian has been advised of the potential risks to privacy not withstanding such measures.    Ana Mcmillan is a 63 y.o. female who presents today for follow up    Chief Complaint:  Anxiety, depression    History of Present Illness: Patient presents as a follow-up via telehealth visit.  She reports medications continue to work well, denies any side effects.  States she has been more stressed recently due to driving her  to several doctor appointments.  States he has had some health issues.  Reports getting out of the house does increase her anxiety however she feels medication is working at current dose and the increase is situational.  She reports sleep is good, denies nightmares.  Reports appetite is good.  She denies SI/HI/AVH.    The following portions of the patient's history were reviewed and updated as appropriate: allergies, current medications, past family history, past medical history, past social history, past surgical history and problem list.      Past Medical History:  Past Medical History:   Diagnosis Date    Anxiety     Chronic pain disorder     Depression     Disease of thyroid gland     Hyperlipidemia      Type 2 diabetes mellitus        Social History:  Social History     Socioeconomic History    Marital status:    Tobacco Use    Smoking status: Never    Smokeless tobacco: Never   Substance and Sexual Activity    Alcohol use: No    Drug use: No    Sexual activity: Defer       Family History:  Family History   Problem Relation Age of Onset    Anxiety disorder Mother     Depression Mother     Anxiety disorder Sister     Depression Sister        Past Surgical History:  History reviewed. No pertinent surgical history.    Problem List:  Patient Active Problem List   Diagnosis    Morbidly obese        Allergy:   Allergies   Allergen Reactions    Codeine Hives and Itching    Acetaminophen Rash        Current Medications:   Current Outpatient Medications   Medication Sig Dispense Refill    atorvastatin (LIPITOR) 80 MG tablet       buPROPion XL (WELLBUTRIN XL) 300 MG 24 hr tablet Take 1 tablet by mouth Every Morning for 90 days. 90 tablet 0    escitalopram (LEXAPRO) 10 MG tablet Take 1.5 tablets by mouth Daily. 135 tablet 0    glipiZIDE XL 10 MG 24 hr tablet       levothyroxine (SYNTHROID, LEVOTHROID) 150 MCG tablet       omeprazole (priLOSEC) 20 MG capsule       prazosin (MINIPRESS) 5 MG capsule Take 2 capsules by mouth Every Night for 90 days. 180 capsule 0    QUEtiapine (SEROquel) 200 MG tablet Take 1 tablet by mouth every night at bedtime. 90 tablet 0     No current facility-administered medications for this visit.       Review of Symptoms:    Review of Systems   Constitutional:  Positive for fatigue.   HENT: Negative.     Eyes: Negative.    Respiratory: Negative.     Cardiovascular: Negative.    Gastrointestinal: Negative.    Genitourinary: Negative.    Musculoskeletal: Negative.    Skin: Negative.    Neurological: Negative.    Psychiatric/Behavioral:  Positive for sleep disturbance, depressed mood and stress. Negative for suicidal ideas. The patient is nervous/anxious.        Physical Exam:   There were no vitals  taken for this visit.  There is no height or weight on file to calculate BMI.    Appearance: Well nourished female, appropriately dressed, appears stated age and in no acute distress  Gait, Station, Strength: FAVIAN    Mental Status Exam:   Hygiene:   good  Cooperation:  Cooperative  Eye Contact:  Good  Psychomotor Behavior:  Appropriate  Affect:  Appropriate  Mood: anxious  Hopelessness: 4  Speech:  Normal  Thought Process:  Linear  Thought Content:  Mood congruent  Suicidal:  None  Homicidal:  None  Hallucinations:  None  Delusion:  None  Memory:  Intact  Orientation:  Person, Place, Time, and Situation  Reliability:  good  Insight:  Good  Judgement:  Good  Impulse Control:  Good  Physical/Medical Issues:   See med hx      PHQ-Score Total:  PHQ-9 Total Score: 3            Lab Results:   No visits with results within 1 Month(s) from this visit.   Latest known visit with results is:   Telemedicine on 03/28/2022   Component Date Value Ref Range Status    External Amphetamine Screen Urine 03/28/2022 Negative   Final    External Benzodiazepine Screen Uri* 03/28/2022 Negative   Final    External Cocaine Screen Urine 03/28/2022 Negative   Final    External THC Screen Urine 03/28/2022 Negative   Final    External Methadone Screen Urine 03/28/2022 Negative   Final    External Methamphetamine Screen Ur* 03/28/2022 Negative   Final    External Oxycodone Screen Urine 03/28/2022 Negative   Final    External Buprenorphine Screen Urine 03/28/2022 Negative   Final    External MDMA 03/28/2022 Negative   Final    External Opiates Screen Urine 03/28/2022 Negative   Final       Assessment & Plan   Diagnoses and all orders for this visit:    1. Generalized anxiety disorder (Primary)  -     escitalopram (LEXAPRO) 10 MG tablet; Take 1.5 tablets by mouth Daily.  Dispense: 135 tablet; Refill: 0  -     QUEtiapine (SEROquel) 200 MG tablet; Take 1 tablet by mouth every night at bedtime.  Dispense: 90 tablet; Refill: 0    2. Moderate episode of  recurrent major depressive disorder  -     buPROPion XL (WELLBUTRIN XL) 300 MG 24 hr tablet; Take 1 tablet by mouth Every Morning for 90 days.  Dispense: 90 tablet; Refill: 0  -     escitalopram (LEXAPRO) 10 MG tablet; Take 1.5 tablets by mouth Daily.  Dispense: 135 tablet; Refill: 0    3. Panic disorder with agoraphobia  -     escitalopram (LEXAPRO) 10 MG tablet; Take 1.5 tablets by mouth Daily.  Dispense: 135 tablet; Refill: 0    4. Post traumatic stress disorder (PTSD)  -     prazosin (MINIPRESS) 5 MG capsule; Take 2 capsules by mouth Every Night for 90 days.  Dispense: 180 capsule; Refill: 0  -     QUEtiapine (SEROquel) 200 MG tablet; Take 1 tablet by mouth every night at bedtime.  Dispense: 90 tablet; Refill: 0    5. Insomnia due to mental disorder  -     prazosin (MINIPRESS) 5 MG capsule; Take 2 capsules by mouth Every Night for 90 days.  Dispense: 180 capsule; Refill: 0  -     QUEtiapine (SEROquel) 200 MG tablet; Take 1 tablet by mouth every night at bedtime.  Dispense: 90 tablet; Refill: 0    6. Medication management        -Continue escitalopram 15 mg nightly for anxiety and depression  -Continue bupropion  mg daily for depression  -Continue quetiapine 200 mg nightly for mood and sleep. Lengthy discussion with patient on the possible side effects of antipsychotic medications including increased cholesterol, increased blood sugar, and possibility of weight gain.  Also discussed the need to monitor lab work associated with this.  The risk of muscle movement disorders with this class of medication was also discussed.  -Continue prazosin 10 mg nightly for nightmares  -Encouraged patient to begin therapy  -SALLIE reviewed and appropriate. Patient counseled on use of controlled substances  -The benefits of a healthy diet and exercise were discussed with patient, especially the positive effects they have on mental health. Patient encouraged to consider lifestyle modification regarding  diet and exercise  patterns to maximize results of mental health treatment.  -Reviewed previous available documentation  -Reviewed most recent available labs              Visit Diagnoses:    ICD-10-CM ICD-9-CM   1. Generalized anxiety disorder  F41.1 300.02   2. Moderate episode of recurrent major depressive disorder  F33.1 296.32   3. Panic disorder with agoraphobia  F40.01 300.21   4. Post traumatic stress disorder (PTSD)  F43.10 309.81   5. Insomnia due to mental disorder  F51.05 300.9     327.02   6. Medication management  Z79.899 V58.69       TREATMENT PLAN/GOALS: Continue supportive psychotherapy efforts and medications as indicated. Treatment and medication options discussed during today's visit. Patient acknowledged and verbally consented to continue with current treatment plan and was educated on the importance of compliance with treatment and follow-up appointments.    MEDICATION ISSUES:    Discussed medication options and treatment plan of prescribed medication as well as the risks, benefits, and side effects including potential falls, possible impaired driving and metabolic adversities among others. Patient is agreeable to call the office with any worsening of symptoms or onset of side effects. Patient is agreeable to call 911 or go to the nearest ER should he/she begin having SI/HI.     MEDS ORDERED DURING VISIT:  New Medications Ordered This Visit   Medications    buPROPion XL (WELLBUTRIN XL) 300 MG 24 hr tablet     Sig: Take 1 tablet by mouth Every Morning for 90 days.     Dispense:  90 tablet     Refill:  0    escitalopram (LEXAPRO) 10 MG tablet     Sig: Take 1.5 tablets by mouth Daily.     Dispense:  135 tablet     Refill:  0    prazosin (MINIPRESS) 5 MG capsule     Sig: Take 2 capsules by mouth Every Night for 90 days.     Dispense:  180 capsule     Refill:  0    QUEtiapine (SEROquel) 200 MG tablet     Sig: Take 1 tablet by mouth every night at bedtime.     Dispense:  90 tablet     Refill:  0       Return in about 3  months (around 11/15/2023), or if symptoms worsen or fail to improve.               This document has been electronically signed by PO Oconnor  August 15, 2023 10:04 EDT    Part of this note may be an electronic transcription/translation of spoken language to printed text using the Dragon Dictation System.

## 2023-11-14 ENCOUNTER — TELEMEDICINE (OUTPATIENT)
Dept: PSYCHIATRY | Facility: CLINIC | Age: 63
End: 2023-11-14
Payer: MEDICARE

## 2023-11-14 DIAGNOSIS — F33.1 MODERATE EPISODE OF RECURRENT MAJOR DEPRESSIVE DISORDER: ICD-10-CM

## 2023-11-14 DIAGNOSIS — Z79.899 MEDICATION MANAGEMENT: ICD-10-CM

## 2023-11-14 DIAGNOSIS — F41.1 GENERALIZED ANXIETY DISORDER: Primary | ICD-10-CM

## 2023-11-14 DIAGNOSIS — F40.01 PANIC DISORDER WITH AGORAPHOBIA: ICD-10-CM

## 2023-11-14 DIAGNOSIS — F51.05 INSOMNIA DUE TO MENTAL DISORDER: ICD-10-CM

## 2023-11-14 DIAGNOSIS — F43.10 POST TRAUMATIC STRESS DISORDER (PTSD): ICD-10-CM

## 2023-11-14 PROCEDURE — 1160F RVW MEDS BY RX/DR IN RCRD: CPT | Performed by: NURSE PRACTITIONER

## 2023-11-14 PROCEDURE — 99214 OFFICE O/P EST MOD 30 MIN: CPT | Performed by: NURSE PRACTITIONER

## 2023-11-14 PROCEDURE — 1159F MED LIST DOCD IN RCRD: CPT | Performed by: NURSE PRACTITIONER

## 2023-11-14 RX ORDER — BUPROPION HYDROCHLORIDE 300 MG/1
300 TABLET ORAL EVERY MORNING
Qty: 90 TABLET | Refills: 0 | Status: SHIPPED | OUTPATIENT
Start: 2023-11-14 | End: 2024-02-12

## 2023-11-14 RX ORDER — PRAZOSIN HYDROCHLORIDE 5 MG/1
10 CAPSULE ORAL NIGHTLY
Qty: 180 CAPSULE | Refills: 0 | Status: SHIPPED | OUTPATIENT
Start: 2023-11-14 | End: 2024-02-12

## 2023-11-14 RX ORDER — ESCITALOPRAM OXALATE 20 MG/1
20 TABLET ORAL DAILY
Qty: 90 TABLET | Refills: 0 | Status: SHIPPED | OUTPATIENT
Start: 2023-11-14

## 2023-11-14 RX ORDER — QUETIAPINE FUMARATE 200 MG/1
200 TABLET, FILM COATED ORAL
Qty: 90 TABLET | Refills: 0 | Status: SHIPPED | OUTPATIENT
Start: 2023-11-14

## 2023-11-14 NOTE — PROGRESS NOTES
This provider is located at the [facility], [address] [place] [address], [id number] using a secure MyChart Video Visit through [employer]. Patient is being seen remotely via telehealth at their home address in [place], and stated they are in a secure environment for this session. The patient's condition being diagnosed/treated is appropriate for telemedicine. The provider identified herself as well as her credentials.   The patient, and/or patients guardian, consent to be seen remotely, and when consent is given they understand that the consent allows for patient identifiable information to be sent to a third party as needed.   They may refuse to be seen remotely at any time. The electronic data is encrypted and password protected, and the patient and/or guardian has been advised of the potential risks to privacy not withstanding such measures.    Ana Mcmillan is a 63 y.o. female who presents today for follow up    Chief Complaint:  Anxiety, depression    History of Present Illness: Patient presents as follow up via telehealth visit. She reports increase with anxiety and depression since last visit. Reports the holidays are difficult for her as Christmas Delmi is the anniversary of her brothers death. States she is also having some difficulty falling asleep. Reports appetite is good. Rates anxiety 6/10; rates depression 5/10 with 10 being the worst. She denies SI/HI/AVH.    The following portions of the patient's history were reviewed and updated as appropriate: allergies, current medications, past family history, past medical history, past social history, past surgical history and problem list.      Past Medical History:  Past Medical History:   Diagnosis Date    Anxiety     Chronic pain disorder     Depression     Disease of thyroid gland     Hyperlipidemia     Type 2 diabetes mellitus        Social History:  Social History     Socioeconomic History    Marital status:     Tobacco Use    Smoking status: Never    Smokeless tobacco: Never   Substance and Sexual Activity    Alcohol use: No    Drug use: No    Sexual activity: Defer       Family History:  Family History   Problem Relation Age of Onset    Anxiety disorder Mother     Depression Mother     Anxiety disorder Sister     Depression Sister        Past Surgical History:  History reviewed. No pertinent surgical history.    Problem List:  Patient Active Problem List   Diagnosis    Morbidly obese        Allergy:   Allergies   Allergen Reactions    Codeine Hives and Itching    Acetaminophen Rash        Current Medications:   Current Outpatient Medications   Medication Sig Dispense Refill    buPROPion XL (WELLBUTRIN XL) 300 MG 24 hr tablet Take 1 tablet by mouth Every Morning for 90 days. 90 tablet 0    escitalopram (LEXAPRO) 20 MG tablet Take 1 tablet by mouth Daily. 90 tablet 0    prazosin (MINIPRESS) 5 MG capsule Take 2 capsules by mouth Every Night for 90 days. 180 capsule 0    QUEtiapine (SEROquel) 200 MG tablet Take 1 tablet by mouth every night at bedtime. 90 tablet 0    atorvastatin (LIPITOR) 80 MG tablet       glipiZIDE XL 10 MG 24 hr tablet       levothyroxine (SYNTHROID, LEVOTHROID) 150 MCG tablet       omeprazole (priLOSEC) 20 MG capsule        No current facility-administered medications for this visit.       Review of Symptoms:    Review of Systems   Constitutional:  Positive for fatigue.   HENT: Negative.     Eyes: Negative.    Respiratory: Negative.     Cardiovascular: Negative.    Gastrointestinal: Negative.    Skin: Negative.    Neurological: Negative.    Psychiatric/Behavioral:  Positive for sleep disturbance, depressed mood and stress. Negative for suicidal ideas. The patient is nervous/anxious.          Physical Exam:   There were no vitals taken for this visit.  There is no height or weight on file to calculate BMI.    Appearance: Well nourished female, appropriately dressed, appears stated age and in no acute  distress  Gait, Station, Strength: FAVIAN    Mental Status Exam:   Hygiene:   good  Cooperation:  Cooperative  Eye Contact:  Good  Psychomotor Behavior:  Appropriate  Affect:  Appropriate  Mood: normal  Hopelessness: 4  Speech:  Normal  Thought Process:  Linear  Thought Content:  Mood congruent  Suicidal:  None  Homicidal:  None  Hallucinations:  None  Delusion:  None  Memory:  Intact  Orientation:  Person, Place, Time, and Situation  Reliability:  good  Insight:  Good  Judgement:  Good  Impulse Control:  Good  Physical/Medical Issues:  No      PHQ-Score Total:  PHQ-9 Total Score: 3            Lab Results:   No visits with results within 1 Month(s) from this visit.   Latest known visit with results is:   Telemedicine on 03/28/2022   Component Date Value Ref Range Status    External Amphetamine Screen Urine 03/28/2022 Negative   Final    External Benzodiazepine Screen Uri* 03/28/2022 Negative   Final    External Cocaine Screen Urine 03/28/2022 Negative   Final    External THC Screen Urine 03/28/2022 Negative   Final    External Methadone Screen Urine 03/28/2022 Negative   Final    External Methamphetamine Screen Ur* 03/28/2022 Negative   Final    External Oxycodone Screen Urine 03/28/2022 Negative   Final    External Buprenorphine Screen Urine 03/28/2022 Negative   Final    External MDMA 03/28/2022 Negative   Final    External Opiates Screen Urine 03/28/2022 Negative   Final       Assessment & Plan   Diagnoses and all orders for this visit:    1. Generalized anxiety disorder (Primary)  -     escitalopram (LEXAPRO) 20 MG tablet; Take 1 tablet by mouth Daily.  Dispense: 90 tablet; Refill: 0  -     QUEtiapine (SEROquel) 200 MG tablet; Take 1 tablet by mouth every night at bedtime.  Dispense: 90 tablet; Refill: 0    2. Moderate episode of recurrent major depressive disorder  -     buPROPion XL (WELLBUTRIN XL) 300 MG 24 hr tablet; Take 1 tablet by mouth Every Morning for 90 days.  Dispense: 90 tablet; Refill: 0  -      escitalopram (LEXAPRO) 20 MG tablet; Take 1 tablet by mouth Daily.  Dispense: 90 tablet; Refill: 0    3. Panic disorder with agoraphobia  -     escitalopram (LEXAPRO) 20 MG tablet; Take 1 tablet by mouth Daily.  Dispense: 90 tablet; Refill: 0    4. Post traumatic stress disorder (PTSD)  -     prazosin (MINIPRESS) 5 MG capsule; Take 2 capsules by mouth Every Night for 90 days.  Dispense: 180 capsule; Refill: 0  -     QUEtiapine (SEROquel) 200 MG tablet; Take 1 tablet by mouth every night at bedtime.  Dispense: 90 tablet; Refill: 0    5. Insomnia due to mental disorder  -     prazosin (MINIPRESS) 5 MG capsule; Take 2 capsules by mouth Every Night for 90 days.  Dispense: 180 capsule; Refill: 0  -     QUEtiapine (SEROquel) 200 MG tablet; Take 1 tablet by mouth every night at bedtime.  Dispense: 90 tablet; Refill: 0    6. Medication management        -Increase escitalopram 20 mg nightly for anxiety and depression  -Continue bupropion  mg daily for depression  -Continue quetiapine 200 mg nightly for mood and sleep. Lengthy discussion with patient on the possible side effects of antipsychotic medications including increased cholesterol, increased blood sugar, and possibility of weight gain.  Also discussed the need to monitor lab work associated with this.  The risk of muscle movement disorders with this class of medication was also discussed.  -Continue prazosin 10 mg nightly for nightmares  -Encouraged patient to begin therapy  -SALLIE reviewed and appropriate. Patient counseled on use of controlled substances  -The benefits of a healthy diet and exercise were discussed with patient, especially the positive effects they have on mental health. Patient encouraged to consider lifestyle modification regarding  diet and exercise patterns to maximize results of mental health treatment.  -Reviewed previous available documentation  -Reviewed most recent available labs                Visit Diagnoses:    ICD-10-CM ICD-9-CM    1. Generalized anxiety disorder  F41.1 300.02   2. Moderate episode of recurrent major depressive disorder  F33.1 296.32   3. Panic disorder with agoraphobia  F40.01 300.21   4. Post traumatic stress disorder (PTSD)  F43.10 309.81   5. Insomnia due to mental disorder  F51.05 300.9     327.02   6. Medication management  Z79.899 V58.69       TREATMENT PLAN/GOALS: Continue supportive psychotherapy efforts and medications as indicated. Treatment and medication options discussed during today's visit. Patient acknowledged and verbally consented to continue with current treatment plan and was educated on the importance of compliance with treatment and follow-up appointments.    MEDICATION ISSUES:    Discussed medication options and treatment plan of prescribed medication as well as the risks, benefits, and side effects including potential falls, possible impaired driving and metabolic adversities among others. Patient is agreeable to call the office with any worsening of symptoms or onset of side effects. Patient is agreeable to call 911 or go to the nearest ER should he/she begin having SI/HI.     MEDS ORDERED DURING VISIT:  New Medications Ordered This Visit   Medications    buPROPion XL (WELLBUTRIN XL) 300 MG 24 hr tablet     Sig: Take 1 tablet by mouth Every Morning for 90 days.     Dispense:  90 tablet     Refill:  0    escitalopram (LEXAPRO) 20 MG tablet     Sig: Take 1 tablet by mouth Daily.     Dispense:  90 tablet     Refill:  0    prazosin (MINIPRESS) 5 MG capsule     Sig: Take 2 capsules by mouth Every Night for 90 days.     Dispense:  180 capsule     Refill:  0    QUEtiapine (SEROquel) 200 MG tablet     Sig: Take 1 tablet by mouth every night at bedtime.     Dispense:  90 tablet     Refill:  0       Return in about 8 weeks (around 1/9/2024), or if symptoms worsen or fail to improve.               This document has been electronically signed by PO Oconnor  November 14, 2023 09:53 EST    Part of  this note may be an electronic transcription/translation of spoken language to printed text using the Dragon Dictation System.

## 2024-01-18 ENCOUNTER — TELEMEDICINE (OUTPATIENT)
Dept: PSYCHIATRY | Facility: CLINIC | Age: 64
End: 2024-01-18
Payer: MEDICARE

## 2024-01-18 DIAGNOSIS — F33.1 MODERATE EPISODE OF RECURRENT MAJOR DEPRESSIVE DISORDER: ICD-10-CM

## 2024-01-18 DIAGNOSIS — F40.01 PANIC DISORDER WITH AGORAPHOBIA: ICD-10-CM

## 2024-01-18 DIAGNOSIS — F41.1 GENERALIZED ANXIETY DISORDER: Primary | ICD-10-CM

## 2024-01-18 DIAGNOSIS — Z79.899 MEDICATION MANAGEMENT: ICD-10-CM

## 2024-01-18 DIAGNOSIS — F51.05 INSOMNIA DUE TO MENTAL DISORDER: ICD-10-CM

## 2024-01-18 DIAGNOSIS — F43.10 POST TRAUMATIC STRESS DISORDER (PTSD): ICD-10-CM

## 2024-01-18 DIAGNOSIS — R46.89 AVOIDANT BEHAVIOR: ICD-10-CM

## 2024-01-18 PROCEDURE — 1159F MED LIST DOCD IN RCRD: CPT | Performed by: NURSE PRACTITIONER

## 2024-01-18 PROCEDURE — 99214 OFFICE O/P EST MOD 30 MIN: CPT | Performed by: NURSE PRACTITIONER

## 2024-01-18 PROCEDURE — 1160F RVW MEDS BY RX/DR IN RCRD: CPT | Performed by: NURSE PRACTITIONER

## 2024-01-18 RX ORDER — PRAZOSIN HYDROCHLORIDE 5 MG/1
10 CAPSULE ORAL NIGHTLY
Qty: 180 CAPSULE | Refills: 0 | Status: SHIPPED | OUTPATIENT
Start: 2024-01-18 | End: 2024-04-17

## 2024-01-18 RX ORDER — QUETIAPINE FUMARATE 200 MG/1
200 TABLET, FILM COATED ORAL
Qty: 90 TABLET | Refills: 0 | Status: SHIPPED | OUTPATIENT
Start: 2024-01-18

## 2024-01-18 RX ORDER — BUPROPION HYDROCHLORIDE 300 MG/1
300 TABLET ORAL EVERY MORNING
Qty: 90 TABLET | Refills: 0 | Status: SHIPPED | OUTPATIENT
Start: 2024-01-18 | End: 2024-04-17

## 2024-01-18 RX ORDER — ESCITALOPRAM OXALATE 20 MG/1
20 TABLET ORAL DAILY
Qty: 90 TABLET | Refills: 0 | Status: SHIPPED | OUTPATIENT
Start: 2024-01-18

## 2024-01-18 NOTE — PROGRESS NOTES
This provider is located at the [facility], [address] [place] [address], [id number] using a secure [employer] Video Visit through [employer]. Patient is being seen remotely via telehealth at their home address in [place], and stated they are in a secure environment for this session. The patient's condition being diagnosed/treated is appropriate for telemedicine. The provider identified herself as well as her credentials.   The patient, and/or patients guardian, consent to be seen remotely, and when consent is given they understand that the consent allows for patient identifiable information to be sent to a third party as needed.   They may refuse to be seen remotely at any time. The electronic data is encrypted and password protected, and the patient and/or guardian has been advised of the potential risks to privacy not withstanding such measures.    Ana Mcmillan is a 63 y.o. female who presents today for follow up    Chief Complaint:  Anxiety, depression    History of Present Illness: Patient presents as follow up via telehealth visit. She reports recent increase of escitalopram has been very helpful. Reports she was able to manage the holidays easier. Reports she occasionally has nightmares but feels medications are working well overall. She reports sleep is good. Reports appetite is good. She denies SI/HI/AVH.    The following portions of the patient's history were reviewed and updated as appropriate: allergies, current medications, past family history, past medical history, past social history, past surgical history and problem list.      Past Medical History:  Past Medical History:   Diagnosis Date    Anxiety     Chronic pain disorder     Depression     Disease of thyroid gland     Hyperlipidemia     Type 2 diabetes mellitus        Social History:  Social History     Socioeconomic History    Marital status:    Tobacco Use    Smoking status: Never    Smokeless tobacco: Never    Substance and Sexual Activity    Alcohol use: No    Drug use: No    Sexual activity: Defer       Family History:  Family History   Problem Relation Age of Onset    Anxiety disorder Mother     Depression Mother     Anxiety disorder Sister     Depression Sister        Past Surgical History:  History reviewed. No pertinent surgical history.    Problem List:  Patient Active Problem List   Diagnosis    Morbidly obese        Allergy:   Allergies   Allergen Reactions    Codeine Hives and Itching    Acetaminophen Rash        Current Medications:   Current Outpatient Medications   Medication Sig Dispense Refill    atorvastatin (LIPITOR) 80 MG tablet       buPROPion XL (WELLBUTRIN XL) 300 MG 24 hr tablet Take 1 tablet by mouth Every Morning for 90 days. 90 tablet 0    escitalopram (LEXAPRO) 20 MG tablet Take 1 tablet by mouth Daily. 90 tablet 0    glipiZIDE XL 10 MG 24 hr tablet       levothyroxine (SYNTHROID, LEVOTHROID) 150 MCG tablet       omeprazole (priLOSEC) 20 MG capsule       prazosin (MINIPRESS) 5 MG capsule Take 2 capsules by mouth Every Night for 90 days. 180 capsule 0    QUEtiapine (SEROquel) 200 MG tablet Take 1 tablet by mouth every night at bedtime. 90 tablet 0     No current facility-administered medications for this visit.       Review of Symptoms:    Review of Systems   Constitutional:  Positive for fatigue.   HENT: Negative.     Eyes: Negative.    Respiratory: Negative.     Cardiovascular: Negative.    Gastrointestinal: Negative.    Musculoskeletal: Negative.    Skin: Negative.    Neurological: Negative.    Psychiatric/Behavioral:  Positive for depressed mood. Negative for suicidal ideas. The patient is nervous/anxious.          Physical Exam:   There were no vitals taken for this visit.  There is no height or weight on file to calculate BMI.    Appearance: Well nourished female, appropriately dressed, appears stated age and in no acute distress  Gait, Station, Strength: FAVIAN    Mental Status Exam:    Hygiene:   good  Cooperation:  Cooperative  Eye Contact:  Good  Psychomotor Behavior:  Appropriate  Affect:  Appropriate  Mood: normal  Hopelessness: Denies  Speech:  Normal  Thought Process:  Linear  Thought Content:  Mood congruent  Suicidal:  None  Homicidal:  None  Hallucinations:  None  Delusion:  None  Memory:  Intact  Orientation:  Person, Place, Time, and Situation  Reliability:  good  Insight:  Good  Judgement:  Good  Impulse Control:  Good  Physical/Medical Issues:   See med hx      PHQ-Score Total:  PHQ-9 Total Score: (P) 10   Patient screened positive for depression based on a PHQ-9 score of 10 on 1/14/2024. Follow-up recommendations include: Prescribed antidepressant medication treatment and Suicide Risk Assessment performed.    JENINFER-7  Feeling nervous, anxious or on edge: (P) More than half the days  Not being able to stop or control worrying: (P) Several days  Worrying too much about different things: (P) Nearly every day  Trouble Relaxing: (P) Several days  Being so restless that it is hard to sit still: (P) Not at all  Feeling afraid as if something awful might happen: (P) Several days  Becoming easily annoyed or irritable: (P) More than half the days  JENNIFER 7 Total Score: (P) 10  If you checked any problems, how difficult have these problems made it for you to do your work, take care of things at home, or get along with other people: (P) Very difficult        Lab Results:   No visits with results within 1 Month(s) from this visit.   Latest known visit with results is:   Telemedicine on 03/28/2022   Component Date Value Ref Range Status    External Amphetamine Screen Urine 03/28/2022 Negative   Final    External Benzodiazepine Screen Uri* 03/28/2022 Negative   Final    External Cocaine Screen Urine 03/28/2022 Negative   Final    External THC Screen Urine 03/28/2022 Negative   Final    External Methadone Screen Urine 03/28/2022 Negative   Final    External Methamphetamine Screen Ur* 03/28/2022  Negative   Final    External Oxycodone Screen Urine 03/28/2022 Negative   Final    External Buprenorphine Screen Urine 03/28/2022 Negative   Final    External MDMA 03/28/2022 Negative   Final    External Opiates Screen Urine 03/28/2022 Negative   Final       Assessment & Plan   Diagnoses and all orders for this visit:    1. Generalized anxiety disorder (Primary)  -     escitalopram (LEXAPRO) 20 MG tablet; Take 1 tablet by mouth Daily.  Dispense: 90 tablet; Refill: 0  -     QUEtiapine (SEROquel) 200 MG tablet; Take 1 tablet by mouth every night at bedtime.  Dispense: 90 tablet; Refill: 0    2. Moderate episode of recurrent major depressive disorder  -     buPROPion XL (WELLBUTRIN XL) 300 MG 24 hr tablet; Take 1 tablet by mouth Every Morning for 90 days.  Dispense: 90 tablet; Refill: 0  -     escitalopram (LEXAPRO) 20 MG tablet; Take 1 tablet by mouth Daily.  Dispense: 90 tablet; Refill: 0    3. Panic disorder with agoraphobia  -     escitalopram (LEXAPRO) 20 MG tablet; Take 1 tablet by mouth Daily.  Dispense: 90 tablet; Refill: 0    4. Post traumatic stress disorder (PTSD)  -     prazosin (MINIPRESS) 5 MG capsule; Take 2 capsules by mouth Every Night for 90 days.  Dispense: 180 capsule; Refill: 0  -     QUEtiapine (SEROquel) 200 MG tablet; Take 1 tablet by mouth every night at bedtime.  Dispense: 90 tablet; Refill: 0    5. Insomnia due to mental disorder  -     prazosin (MINIPRESS) 5 MG capsule; Take 2 capsules by mouth Every Night for 90 days.  Dispense: 180 capsule; Refill: 0  -     QUEtiapine (SEROquel) 200 MG tablet; Take 1 tablet by mouth every night at bedtime.  Dispense: 90 tablet; Refill: 0    6. Medication management    7. Avoidant behavior        -Continue escitalopram 20 mg nightly for anxiety and depression  -Continue bupropion  mg daily for depression  -Continue quetiapine 200 mg nightly for mood and sleep. Lengthy discussion with patient on the possible side effects of antipsychotic medications  including increased cholesterol, increased blood sugar, and possibility of weight gain.  Also discussed the need to monitor lab work associated with this.  The risk of muscle movement disorders with this class of medication was also discussed.  -Continue prazosin 10 mg nightly for nightmares  -Encouraged patient to begin therapy  -SALLIE reviewed and appropriate. Patient counseled on use of controlled substances  -The benefits of a healthy diet and exercise were discussed with patient, especially the positive effects they have on mental health. Patient encouraged to consider lifestyle modification regarding  diet and exercise patterns to maximize results of mental health treatment.  -Reviewed previous available documentation  -Reviewed most recent available labs              Visit Diagnoses:    ICD-10-CM ICD-9-CM   1. Generalized anxiety disorder  F41.1 300.02   2. Moderate episode of recurrent major depressive disorder  F33.1 296.32   3. Panic disorder with agoraphobia  F40.01 300.21   4. Post traumatic stress disorder (PTSD)  F43.10 309.81   5. Insomnia due to mental disorder  F51.05 300.9     327.02   6. Medication management  Z79.899 V58.69   7. Avoidant behavior  R46.89 V40.39       TREATMENT PLAN/GOALS: Continue supportive psychotherapy efforts and medications as indicated. Treatment and medication options discussed during today's visit. Patient acknowledged and verbally consented to continue with current treatment plan and was educated on the importance of compliance with treatment and follow-up appointments.    MEDICATION ISSUES:    Discussed medication options and treatment plan of prescribed medication as well as the risks, benefits, and side effects including potential falls, possible impaired driving and metabolic adversities among others. Patient is agreeable to call the office with any worsening of symptoms or onset of side effects. Patient is agreeable to call 911 or go to the nearest ER should he/she  begin having SI/HI.     MEDS ORDERED DURING VISIT:  New Medications Ordered This Visit   Medications    buPROPion XL (WELLBUTRIN XL) 300 MG 24 hr tablet     Sig: Take 1 tablet by mouth Every Morning for 90 days.     Dispense:  90 tablet     Refill:  0    escitalopram (LEXAPRO) 20 MG tablet     Sig: Take 1 tablet by mouth Daily.     Dispense:  90 tablet     Refill:  0    prazosin (MINIPRESS) 5 MG capsule     Sig: Take 2 capsules by mouth Every Night for 90 days.     Dispense:  180 capsule     Refill:  0    QUEtiapine (SEROquel) 200 MG tablet     Sig: Take 1 tablet by mouth every night at bedtime.     Dispense:  90 tablet     Refill:  0       Return in about 3 months (around 4/18/2024), or if symptoms worsen or fail to improve.               This document has been electronically signed by PO Oconnor  January 18, 2024 10:59 EST    Part of this note may be an electronic transcription/translation of spoken language to printed text using the Dragon Dictation System.

## 2024-04-18 ENCOUNTER — TELEMEDICINE (OUTPATIENT)
Dept: PSYCHIATRY | Facility: CLINIC | Age: 64
End: 2024-04-18
Payer: MEDICARE

## 2024-04-18 DIAGNOSIS — F51.05 INSOMNIA DUE TO MENTAL DISORDER: ICD-10-CM

## 2024-04-18 DIAGNOSIS — R46.89 AVOIDANT BEHAVIOR: ICD-10-CM

## 2024-04-18 DIAGNOSIS — F33.1 MODERATE EPISODE OF RECURRENT MAJOR DEPRESSIVE DISORDER: Primary | ICD-10-CM

## 2024-04-18 DIAGNOSIS — F43.10 POST TRAUMATIC STRESS DISORDER (PTSD): ICD-10-CM

## 2024-04-18 DIAGNOSIS — F40.01 PANIC DISORDER WITH AGORAPHOBIA: ICD-10-CM

## 2024-04-18 DIAGNOSIS — F41.1 GENERALIZED ANXIETY DISORDER: ICD-10-CM

## 2024-04-18 DIAGNOSIS — Z79.899 MEDICATION MANAGEMENT: ICD-10-CM

## 2024-04-18 PROCEDURE — 99214 OFFICE O/P EST MOD 30 MIN: CPT | Performed by: NURSE PRACTITIONER

## 2024-04-18 PROCEDURE — 1159F MED LIST DOCD IN RCRD: CPT | Performed by: NURSE PRACTITIONER

## 2024-04-18 PROCEDURE — 1160F RVW MEDS BY RX/DR IN RCRD: CPT | Performed by: NURSE PRACTITIONER

## 2024-04-18 RX ORDER — ESCITALOPRAM OXALATE 20 MG/1
20 TABLET ORAL DAILY
Qty: 90 TABLET | Refills: 0 | Status: SHIPPED | OUTPATIENT
Start: 2024-04-18

## 2024-04-18 RX ORDER — QUETIAPINE FUMARATE 200 MG/1
200 TABLET, FILM COATED ORAL
Qty: 90 TABLET | Refills: 0 | Status: SHIPPED | OUTPATIENT
Start: 2024-04-18

## 2024-04-18 RX ORDER — PRAZOSIN HYDROCHLORIDE 5 MG/1
10 CAPSULE ORAL NIGHTLY
Qty: 180 CAPSULE | Refills: 0 | Status: SHIPPED | OUTPATIENT
Start: 2024-04-18 | End: 2024-07-17

## 2024-04-18 RX ORDER — BUPROPION HYDROCHLORIDE 300 MG/1
300 TABLET ORAL EVERY MORNING
Qty: 90 TABLET | Refills: 0 | Status: SHIPPED | OUTPATIENT
Start: 2024-04-18 | End: 2024-07-17

## 2024-04-18 NOTE — PROGRESS NOTES
This provider is located [place] [address] and using a secure [employer] Video Visit through [employer]. Patient is being seen remotely via telehealth at their home address in [place], and stated they are in a secure environment for this session. The patient's condition being diagnosed/treated is appropriate for telemedicine. The provider identified herself as well as her credentials. The patient, and/or patients guardian, consent to be seen remotely, and when consent is given they understand that the consent allows for patient identifiable information to be sent to a third party as needed. They may refuse to be seen remotely at any time. The electronic data is encrypted and password protected, and the patient and/or guardian has been advised of the potential risks to privacy not withstanding such measures.    Subjective   Delphine Mcmillan is a 63 y.o. female who presents today for follow up    Chief Complaint:  Depression    History of Present Illness: Patient presents as follow up via telehealth visit. Reports her father passed away a 2 weeks ago and has opened up childhood trauma. States there are things she will never get an answer to since her parents are gone. Reports her sister is very dependent on her and at times she feels overwhelmed from feeling like her caretaker. States she is open to restarting therapy and would like an appointment to be made. She reports sleep is poor recently due to racing thoughts. Reports appetite is good. She denies SI/HI/AVH.    The following portions of the patient's history were reviewed and updated as appropriate: allergies, current medications, past family history, past medical history, past social history, past surgical history and problem list.      Past Medical History:  Past Medical History:   Diagnosis Date    Anxiety     Chronic pain disorder     Depression     Disease of thyroid gland     Hyperlipidemia     Type 2 diabetes mellitus        Social  History:  Social History     Socioeconomic History    Marital status:    Tobacco Use    Smoking status: Never    Smokeless tobacco: Never   Substance and Sexual Activity    Alcohol use: No    Drug use: No    Sexual activity: Defer       Family History:  Family History   Problem Relation Age of Onset    Anxiety disorder Mother     Depression Mother     Anxiety disorder Sister     Depression Sister        Past Surgical History:  History reviewed. No pertinent surgical history.    Problem List:  Patient Active Problem List   Diagnosis    Morbidly obese        Allergy:   Allergies   Allergen Reactions    Codeine Hives and Itching    Acetaminophen Rash        Current Medications:   Current Outpatient Medications   Medication Sig Dispense Refill    atorvastatin (LIPITOR) 80 MG tablet       buPROPion XL (WELLBUTRIN XL) 300 MG 24 hr tablet Take 1 tablet by mouth Every Morning for 90 days. 90 tablet 0    escitalopram (LEXAPRO) 20 MG tablet Take 1 tablet by mouth Daily. 90 tablet 0    glipiZIDE XL 10 MG 24 hr tablet       levothyroxine (SYNTHROID, LEVOTHROID) 150 MCG tablet       omeprazole (priLOSEC) 20 MG capsule       prazosin (MINIPRESS) 5 MG capsule Take 2 capsules by mouth Every Night for 90 days. 180 capsule 0    QUEtiapine (SEROquel) 200 MG tablet Take 1 tablet by mouth every night at bedtime. 90 tablet 0     No current facility-administered medications for this visit.       Review of Symptoms:    Review of Systems   Constitutional:  Positive for fatigue.   HENT: Negative.     Eyes: Negative.    Respiratory: Negative.     Cardiovascular: Negative.    Gastrointestinal: Negative.    Musculoskeletal:  Positive for arthralgias and joint swelling.   Neurological: Negative.    Psychiatric/Behavioral:  Positive for sleep disturbance, depressed mood and stress. Negative for suicidal ideas. The patient is nervous/anxious.          Physical Exam:   There were no vitals taken for this visit.  There is no height or weight  on file to calculate BMI.    Appearance: Well nourished female, appropriately dressed, appears stated age and in no acute distress  Gait, Station, Strength: FAVIAN    Mental Status Exam:   Hygiene:   good  Cooperation:  Cooperative  Eye Contact:  Good  Psychomotor Behavior:  Appropriate  Affect:  Appropriate  Mood: sad  Hopelessness: 5  Speech:  Normal  Thought Process:  Linear  Thought Content:  Normal and Mood congruent  Suicidal:  None  Homicidal:  None  Hallucinations:  None  Delusion:  None  Memory:  Intact  Orientation:  Person, Place, Time, and Situation  Reliability:  good  Insight:  Fair  Judgement:  Good  Impulse Control:  Good  Physical/Medical Issues:   See med hx      PHQ-Score Total:  PHQ-9 Total Score: (P) 15   Patient screened positive for depression based on a PHQ-9 score of 15 on 4/11/2024. Follow-up recommendations include: Prescribed antidepressant medication treatment and Suicide Risk Assessment performed.    JENNIFER-7  Feeling nervous, anxious or on edge: (P) Nearly every day  Not being able to stop or control worrying: (P) Nearly every day  Worrying too much about different things: (P) Nearly every day  Trouble Relaxing: (P) Nearly every day  Being so restless that it is hard to sit still: (P) More than half the days  Feeling afraid as if something awful might happen: (P) More than half the days  Becoming easily annoyed or irritable: (P) Nearly every day  JENNIFER 7 Total Score: (P) 19  If you checked any problems, how difficult have these problems made it for you to do your work, take care of things at home, or get along with other people: (P) Very difficult        Lab Results:   No visits with results within 1 Month(s) from this visit.   Latest known visit with results is:   Telemedicine on 03/28/2022   Component Date Value Ref Range Status    External Amphetamine Screen Urine 03/28/2022 Negative   Final    External Benzodiazepine Screen Uri* 03/28/2022 Negative   Final    External Cocaine Screen Urine  03/28/2022 Negative   Final    External THC Screen Urine 03/28/2022 Negative   Final    External Methadone Screen Urine 03/28/2022 Negative   Final    External Methamphetamine Screen Ur* 03/28/2022 Negative   Final    External Oxycodone Screen Urine 03/28/2022 Negative   Final    External Buprenorphine Screen Urine 03/28/2022 Negative   Final    External MDMA 03/28/2022 Negative   Final    External Opiates Screen Urine 03/28/2022 Negative   Final       Assessment & Plan   Diagnoses and all orders for this visit:    1. Moderate episode of recurrent major depressive disorder (Primary)  -     buPROPion XL (WELLBUTRIN XL) 300 MG 24 hr tablet; Take 1 tablet by mouth Every Morning for 90 days.  Dispense: 90 tablet; Refill: 0  -     escitalopram (LEXAPRO) 20 MG tablet; Take 1 tablet by mouth Daily.  Dispense: 90 tablet; Refill: 0    2. Generalized anxiety disorder  -     escitalopram (LEXAPRO) 20 MG tablet; Take 1 tablet by mouth Daily.  Dispense: 90 tablet; Refill: 0  -     QUEtiapine (SEROquel) 200 MG tablet; Take 1 tablet by mouth every night at bedtime.  Dispense: 90 tablet; Refill: 0    3. Panic disorder with agoraphobia  -     escitalopram (LEXAPRO) 20 MG tablet; Take 1 tablet by mouth Daily.  Dispense: 90 tablet; Refill: 0    4. Post traumatic stress disorder (PTSD)  -     prazosin (MINIPRESS) 5 MG capsule; Take 2 capsules by mouth Every Night for 90 days.  Dispense: 180 capsule; Refill: 0  -     QUEtiapine (SEROquel) 200 MG tablet; Take 1 tablet by mouth every night at bedtime.  Dispense: 90 tablet; Refill: 0    5. Insomnia due to mental disorder  -     prazosin (MINIPRESS) 5 MG capsule; Take 2 capsules by mouth Every Night for 90 days.  Dispense: 180 capsule; Refill: 0  -     QUEtiapine (SEROquel) 200 MG tablet; Take 1 tablet by mouth every night at bedtime.  Dispense: 90 tablet; Refill: 0    6. Medication management    7. Avoidant behavior        -Continue escitalopram 20 mg nightly for anxiety and  depression  -Continue bupropion  mg daily for depression  -Continue quetiapine 200 mg nightly for mood and sleep. Lengthy discussion with patient on the possible side effects of antipsychotic medications including increased cholesterol, increased blood sugar, and possibility of weight gain.  Also discussed the need to monitor lab work associated with this.  The risk of muscle movement disorders with this class of medication was also discussed.  -Continue prazosin 10 mg nightly for nightmares  -Encouraged patient to restart therapy, she is agreeable  -SALLIE reviewed and appropriate. Patient counseled on use of controlled substances  -The benefits of a healthy diet and exercise were discussed with patient, especially the positive effects they have on mental health. Patient encouraged to consider lifestyle modification regarding  diet and exercise patterns to maximize results of mental health treatment.  -Reviewed previous available documentation  -Reviewed most recent available labs                Visit Diagnoses:    ICD-10-CM ICD-9-CM   1. Moderate episode of recurrent major depressive disorder  F33.1 296.32   2. Generalized anxiety disorder  F41.1 300.02   3. Panic disorder with agoraphobia  F40.01 300.21   4. Post traumatic stress disorder (PTSD)  F43.10 309.81   5. Insomnia due to mental disorder  F51.05 300.9     327.02   6. Medication management  Z79.899 V58.69   7. Avoidant behavior  R46.89 V40.39       TREATMENT PLAN/GOALS: Continue supportive psychotherapy efforts and medications as indicated. Treatment and medication options discussed during today's visit. Patient acknowledged and verbally consented to continue with current treatment plan and was educated on the importance of compliance with treatment and follow-up appointments.    MEDICATION ISSUES:    Discussed medication options and treatment plan of prescribed medication as well as the risks, benefits, and side effects including potential falls,  possible impaired driving and metabolic adversities among others. Patient is agreeable to call the office with any worsening of symptoms or onset of side effects. Patient is agreeable to call 911 or go to the nearest ER should he/she begin having SI/HI.     MEDS ORDERED DURING VISIT:  New Medications Ordered This Visit   Medications    buPROPion XL (WELLBUTRIN XL) 300 MG 24 hr tablet     Sig: Take 1 tablet by mouth Every Morning for 90 days.     Dispense:  90 tablet     Refill:  0    escitalopram (LEXAPRO) 20 MG tablet     Sig: Take 1 tablet by mouth Daily.     Dispense:  90 tablet     Refill:  0    prazosin (MINIPRESS) 5 MG capsule     Sig: Take 2 capsules by mouth Every Night for 90 days.     Dispense:  180 capsule     Refill:  0    QUEtiapine (SEROquel) 200 MG tablet     Sig: Take 1 tablet by mouth every night at bedtime.     Dispense:  90 tablet     Refill:  0       Return in about 6 weeks (around 5/30/2024), or if symptoms worsen or fail to improve.               This document has been electronically signed by PO Oconnor  April 18, 2024 10:51 EDT    Part of this note may be an electronic transcription/translation of spoken language to printed text using the Dragon Dictation System.

## 2024-05-14 ENCOUNTER — TELEMEDICINE (OUTPATIENT)
Dept: PSYCHIATRY | Facility: CLINIC | Age: 64
End: 2024-05-14
Payer: MEDICARE

## 2024-05-14 DIAGNOSIS — Z79.899 MEDICATION MANAGEMENT: ICD-10-CM

## 2024-05-14 DIAGNOSIS — F43.10 POST TRAUMATIC STRESS DISORDER (PTSD): ICD-10-CM

## 2024-05-14 DIAGNOSIS — F41.1 GENERALIZED ANXIETY DISORDER: ICD-10-CM

## 2024-05-14 DIAGNOSIS — R46.89 AVOIDANT BEHAVIOR: ICD-10-CM

## 2024-05-14 DIAGNOSIS — F40.01 PANIC DISORDER WITH AGORAPHOBIA: ICD-10-CM

## 2024-05-14 DIAGNOSIS — F33.1 MODERATE EPISODE OF RECURRENT MAJOR DEPRESSIVE DISORDER: Primary | ICD-10-CM

## 2024-05-14 DIAGNOSIS — F51.05 INSOMNIA DUE TO MENTAL DISORDER: ICD-10-CM

## 2024-05-14 PROCEDURE — 1160F RVW MEDS BY RX/DR IN RCRD: CPT | Performed by: NURSE PRACTITIONER

## 2024-05-14 PROCEDURE — 1159F MED LIST DOCD IN RCRD: CPT | Performed by: NURSE PRACTITIONER

## 2024-05-14 PROCEDURE — 99214 OFFICE O/P EST MOD 30 MIN: CPT | Performed by: NURSE PRACTITIONER

## 2024-05-14 NOTE — PROGRESS NOTES
"This provider is located at the [facility], [address] [place] [address], [id number] using a secure [employer] Video Visit through [employer]. Patient is being seen remotely via telehealth at their home address in [place], and stated they are in a secure environment for this session. The patient's condition being diagnosed/treated is appropriate for telemedicine. The provider identified herself as well as her credentials.   The patient, and/or patients guardian, consent to be seen remotely, and when consent is given they understand that the consent allows for patient identifiable information to be sent to a third party as needed.   They may refuse to be seen remotely at any time. The electronic data is encrypted and password protected, and the patient and/or guardian has been advised of the potential risks to privacy not withstanding such measures.    Subjective   Delphine Mcmillan is a 63 y.o. female who presents today for follow up    Chief Complaint:  Depression    History of Present Illness: Patient presents as follow up via telehealth visit. Reports the family is now having issues with her nephew fighting them over her father's house. States this stress is affecting her grieving process. Reports she does have  an appointment with therapist in a few weeks. Reports medications are working \"good\". Reports sleep is fair. Reports appetite is good. She denies SI/HI/AVH.    The following portions of the patient's history were reviewed and updated as appropriate: allergies, current medications, past family history, past medical history, past social history, past surgical history and problem list.      Past Medical History:  Past Medical History:   Diagnosis Date    Anxiety     Chronic pain disorder     Depression     Disease of thyroid gland     Hyperlipidemia     Type 2 diabetes mellitus        Social History:  Social History     Socioeconomic History    Marital status:    Tobacco Use    Smoking " status: Never    Smokeless tobacco: Never   Substance and Sexual Activity    Alcohol use: No    Drug use: No    Sexual activity: Defer       Family History:  Family History   Problem Relation Age of Onset    Anxiety disorder Mother     Depression Mother     Anxiety disorder Sister     Depression Sister        Past Surgical History:  History reviewed. No pertinent surgical history.    Problem List:  Patient Active Problem List   Diagnosis    Morbidly obese        Allergy:   Allergies   Allergen Reactions    Codeine Hives and Itching    Acetaminophen Rash        Current Medications:   Current Outpatient Medications   Medication Sig Dispense Refill    atorvastatin (LIPITOR) 80 MG tablet       buPROPion XL (WELLBUTRIN XL) 300 MG 24 hr tablet Take 1 tablet by mouth Every Morning for 90 days. 90 tablet 0    escitalopram (LEXAPRO) 20 MG tablet Take 1 tablet by mouth Daily. 90 tablet 0    glipiZIDE XL 10 MG 24 hr tablet       levothyroxine (SYNTHROID, LEVOTHROID) 150 MCG tablet       omeprazole (priLOSEC) 20 MG capsule       prazosin (MINIPRESS) 5 MG capsule Take 2 capsules by mouth Every Night for 90 days. 180 capsule 0    QUEtiapine (SEROquel) 200 MG tablet Take 1 tablet by mouth every night at bedtime. 90 tablet 0     No current facility-administered medications for this visit.       Review of Symptoms:    Review of Systems   Constitutional:  Positive for fatigue.   HENT: Negative.     Eyes: Negative.    Respiratory: Negative.     Cardiovascular: Negative.    Gastrointestinal: Negative.    Neurological: Negative.    Psychiatric/Behavioral:  Positive for sleep disturbance, depressed mood and stress. Negative for suicidal ideas. The patient is nervous/anxious.          Physical Exam:   There were no vitals taken for this visit.  There is no height or weight on file to calculate BMI.    Appearance: Well nourished female, appropriately dressed, appears stated age and in no acute distress  Gait, Station, Strength:  FAVIAN    Mental Status Exam:   Hygiene:   good  Cooperation:  Cooperative  Eye Contact:  Good  Psychomotor Behavior:  Appropriate  Affect:  Appropriate  Mood: normal  Hopelessness: 3  Speech:  Normal  Thought Process:  Linear  Thought Content:  Mood congruent  Suicidal:  None  Homicidal:  None  Hallucinations:  None  Delusion:  None  Memory:  Intact  Orientation:  Person, Place, Time, and Situation  Reliability:  good  Insight:  Good  Judgement:  Good  Impulse Control:  Good  Physical/Medical Issues:   See med hx      PHQ-Score Total:  PHQ-9 Total Score: (P) 17   Patient screened positive for depression based on a PHQ-9 score of 17 on 5/7/2024. Follow-up recommendations include: Prescribed antidepressant medication treatment and Suicide Risk Assessment performed.    JENNIFER-7  Feeling nervous, anxious or on edge: (P) Nearly every day  Not being able to stop or control worrying: (P) More than half the days  Worrying too much about different things: (P) More than half the days  Trouble Relaxing: (P) Nearly every day  Being so restless that it is hard to sit still: (P) Several days  Feeling afraid as if something awful might happen: (P) Several days  Becoming easily annoyed or irritable: (P) More than half the days  JENNIFER 7 Total Score: (P) 14  If you checked any problems, how difficult have these problems made it for you to do your work, take care of things at home, or get along with other people: (P) Very difficult        Lab Results:   No visits with results within 1 Month(s) from this visit.   Latest known visit with results is:   Telemedicine on 03/28/2022   Component Date Value Ref Range Status    External Amphetamine Screen Urine 03/28/2022 Negative   Final    External Benzodiazepine Screen Uri* 03/28/2022 Negative   Final    External Cocaine Screen Urine 03/28/2022 Negative   Final    External THC Screen Urine 03/28/2022 Negative   Final    External Methadone Screen Urine 03/28/2022 Negative   Final    External  Methamphetamine Screen Ur* 03/28/2022 Negative   Final    External Oxycodone Screen Urine 03/28/2022 Negative   Final    External Buprenorphine Screen Urine 03/28/2022 Negative   Final    External MDMA 03/28/2022 Negative   Final    External Opiates Screen Urine 03/28/2022 Negative   Final       Assessment & Plan   Diagnoses and all orders for this visit:    1. Moderate episode of recurrent major depressive disorder (Primary)    2. Generalized anxiety disorder    3. Panic disorder with agoraphobia    4. Post traumatic stress disorder (PTSD)    5. Insomnia due to mental disorder    6. Medication management    7. Avoidant behavior        -Continue escitalopram 20 mg nightly for anxiety and depression  -Continue bupropion  mg daily for depression  -Continue quetiapine 200 mg nightly for mood and sleep. Lengthy discussion with patient on the possible side effects of antipsychotic medications including increased cholesterol, increased blood sugar, and possibility of weight gain.  Also discussed the need to monitor lab work associated with this.  The risk of muscle movement disorders with this class of medication was also discussed.  -Continue prazosin 10 mg nightly for nightmares  -Encouraged patient to restart therapy, she is agreeable  -SALLIE reviewed and appropriate. Patient counseled on use of controlled substances  -The benefits of a healthy diet and exercise were discussed with patient, especially the positive effects they have on mental health. Patient encouraged to consider lifestyle modification regarding  diet and exercise patterns to maximize results of mental health treatment.  -Reviewed previous available documentation  -Reviewed most recent available labs                Visit Diagnoses:    ICD-10-CM ICD-9-CM   1. Moderate episode of recurrent major depressive disorder  F33.1 296.32   2. Generalized anxiety disorder  F41.1 300.02   3. Panic disorder with agoraphobia  F40.01 300.21   4. Post traumatic  stress disorder (PTSD)  F43.10 309.81   5. Insomnia due to mental disorder  F51.05 300.9     327.02   6. Medication management  Z79.899 V58.69   7. Avoidant behavior  R46.89 V40.39       TREATMENT PLAN/GOALS: Continue supportive psychotherapy efforts and medications as indicated. Treatment and medication options discussed during today's visit. Patient acknowledged and verbally consented to continue with current treatment plan and was educated on the importance of compliance with treatment and follow-up appointments.    MEDICATION ISSUES:    Discussed medication options and treatment plan of prescribed medication as well as the risks, benefits, and side effects including potential falls, possible impaired driving and metabolic adversities among others. Patient is agreeable to call the office with any worsening of symptoms or onset of side effects. Patient is agreeable to call 911 or go to the nearest ER should he/she begin having SI/HI.     MEDS ORDERED DURING VISIT:  No orders of the defined types were placed in this encounter.      Return in about 4 weeks (around 6/11/2024), or if symptoms worsen or fail to improve.               This document has been electronically signed by PO Oconnor  May 14, 2024 11:34 EDT    Part of this note may be an electronic transcription/translation of spoken language to printed text using the Dragon Dictation System.

## 2024-06-05 ENCOUNTER — TELEMEDICINE (OUTPATIENT)
Dept: PSYCHIATRY | Facility: CLINIC | Age: 64
End: 2024-06-05
Payer: MEDICARE

## 2024-06-05 DIAGNOSIS — F41.1 GENERALIZED ANXIETY DISORDER: ICD-10-CM

## 2024-06-05 DIAGNOSIS — F33.1 MODERATE EPISODE OF RECURRENT MAJOR DEPRESSIVE DISORDER: Primary | ICD-10-CM

## 2024-06-05 DIAGNOSIS — F40.01 PANIC DISORDER WITH AGORAPHOBIA: ICD-10-CM

## 2024-06-05 DIAGNOSIS — F43.21 GRIEF REACTION: ICD-10-CM

## 2024-06-11 ENCOUNTER — TELEMEDICINE (OUTPATIENT)
Dept: PSYCHIATRY | Facility: CLINIC | Age: 64
End: 2024-06-11
Payer: MEDICARE

## 2024-06-11 DIAGNOSIS — Z79.899 MEDICATION MANAGEMENT: ICD-10-CM

## 2024-06-11 DIAGNOSIS — F33.1 MODERATE EPISODE OF RECURRENT MAJOR DEPRESSIVE DISORDER: ICD-10-CM

## 2024-06-11 DIAGNOSIS — F40.01 PANIC DISORDER WITH AGORAPHOBIA: ICD-10-CM

## 2024-06-11 DIAGNOSIS — F43.10 POST TRAUMATIC STRESS DISORDER (PTSD): ICD-10-CM

## 2024-06-11 DIAGNOSIS — F51.05 INSOMNIA DUE TO MENTAL DISORDER: ICD-10-CM

## 2024-06-11 DIAGNOSIS — F41.1 GENERALIZED ANXIETY DISORDER: Primary | ICD-10-CM

## 2024-06-11 PROCEDURE — 99214 OFFICE O/P EST MOD 30 MIN: CPT | Performed by: NURSE PRACTITIONER

## 2024-06-11 PROCEDURE — 1160F RVW MEDS BY RX/DR IN RCRD: CPT | Performed by: NURSE PRACTITIONER

## 2024-06-11 PROCEDURE — 1159F MED LIST DOCD IN RCRD: CPT | Performed by: NURSE PRACTITIONER

## 2024-06-11 RX ORDER — QUETIAPINE FUMARATE 200 MG/1
200 TABLET, FILM COATED ORAL
Qty: 90 TABLET | Refills: 0 | Status: SHIPPED | OUTPATIENT
Start: 2024-06-11

## 2024-06-11 RX ORDER — BUPROPION HYDROCHLORIDE 300 MG/1
300 TABLET ORAL EVERY MORNING
Qty: 90 TABLET | Refills: 0 | Status: SHIPPED | OUTPATIENT
Start: 2024-06-11 | End: 2024-09-09

## 2024-06-11 RX ORDER — ESCITALOPRAM OXALATE 20 MG/1
20 TABLET ORAL DAILY
Qty: 90 TABLET | Refills: 0 | Status: SHIPPED | OUTPATIENT
Start: 2024-06-11

## 2024-06-11 RX ORDER — PRAZOSIN HYDROCHLORIDE 5 MG/1
10 CAPSULE ORAL NIGHTLY
Qty: 180 CAPSULE | Refills: 0 | Status: SHIPPED | OUTPATIENT
Start: 2024-06-11 | End: 2024-09-09

## 2024-06-11 NOTE — PROGRESS NOTES
This provider is located at the [facility], [address] [place] [address], [id number] using a secure [employer] Video Visit through [employer]. Patient is being seen remotely via telehealth at their home address in [place], and stated they are in a secure environment for this session. The patient's condition being diagnosed/treated is appropriate for telemedicine. The provider identified herself as well as her credentials.   The patient, and/or patients guardian, consent to be seen remotely, and when consent is given they understand that the consent allows for patient identifiable information to be sent to a third party as needed.   They may refuse to be seen remotely at any time. The electronic data is encrypted and password protected, and the patient and/or guardian has been advised of the potential risks to privacy not withstanding such measures.    Ana Mcmillan is a 63 y.o. female who presents today for follow up    Chief Complaint:  Depression    History of Present Illness: Patient presents as follow up via telehealth visit. Reports the issue with her father's house is ongoing, the family is hoping to go to probate court soon. Reports overall medications continue to work well. States she is continuing therapy. Denies any issues with appetite or sleep. She denies SI/HI/AVH.    The following portions of the patient's history were reviewed and updated as appropriate: allergies, current medications, past family history, past medical history, past social history, past surgical history and problem list.      Past Medical History:  Past Medical History:   Diagnosis Date    Anxiety     Chronic pain disorder     Depression     Disease of thyroid gland     Hyperlipidemia     Type 2 diabetes mellitus        Social History:  Social History     Socioeconomic History    Marital status:    Tobacco Use    Smoking status: Never    Smokeless tobacco: Never   Substance and Sexual Activity     Alcohol use: No    Drug use: No    Sexual activity: Defer       Family History:  Family History   Problem Relation Age of Onset    Anxiety disorder Mother     Depression Mother     Anxiety disorder Sister     Depression Sister        Past Surgical History:  History reviewed. No pertinent surgical history.    Problem List:  Patient Active Problem List   Diagnosis    Morbidly obese        Allergy:   Allergies   Allergen Reactions    Codeine Hives and Itching    Acetaminophen Rash        Current Medications:   Current Outpatient Medications   Medication Sig Dispense Refill    atorvastatin (LIPITOR) 80 MG tablet       buPROPion XL (WELLBUTRIN XL) 300 MG 24 hr tablet Take 1 tablet by mouth Every Morning for 90 days. 90 tablet 0    escitalopram (LEXAPRO) 20 MG tablet Take 1 tablet by mouth Daily. 90 tablet 0    glipiZIDE XL 10 MG 24 hr tablet       levothyroxine (SYNTHROID, LEVOTHROID) 150 MCG tablet       omeprazole (priLOSEC) 20 MG capsule       prazosin (MINIPRESS) 5 MG capsule Take 2 capsules by mouth Every Night for 90 days. 180 capsule 0    QUEtiapine (SEROquel) 200 MG tablet Take 1 tablet by mouth every night at bedtime. 90 tablet 0     No current facility-administered medications for this visit.       Review of Symptoms:    Review of Systems   Constitutional:  Positive for fatigue.   HENT: Negative.     Eyes: Negative.    Respiratory: Negative.     Cardiovascular: Negative.    Gastrointestinal: Negative.    Musculoskeletal: Negative.    Skin: Negative.    Neurological: Negative.    Psychiatric/Behavioral:  Positive for depressed mood and stress. Negative for suicidal ideas. The patient is nervous/anxious.          Physical Exam:   There were no vitals taken for this visit.  There is no height or weight on file to calculate BMI.    Appearance: Well nourished female, appropriately dressed, appears stated age and in no acute distress    Gait, Station, Strength: FAVIAN    Mental Status Exam:   Hygiene:    good  Cooperation:  Cooperative  Eye Contact:  Good  Psychomotor Behavior:  Appropriate  Affect:  Appropriate  Mood: normal  Hopelessness: 3  Speech:  Normal  Thought Process:  Linear  Thought Content:  Mood congruent  Suicidal:  None  Homicidal:  None  Hallucinations:  None  Delusion:  None  Memory:  Intact  Orientation:  Person, Place, Time, and Situation  Reliability:  good  Insight:  Good  Judgement:  Good  Impulse Control:  Good  Physical/Medical Issues:   See med hx      PHQ-Score Total:  PHQ-9 Total Score: (P) 13   Patient screened positive for depression based on a PHQ-9 score of 13 on 6/4/2024. Follow-up recommendations include: Prescribed antidepressant medication treatment and Suicide Risk Assessment performed.          Lab Results:   No visits with results within 1 Month(s) from this visit.   Latest known visit with results is:   Telemedicine on 03/28/2022   Component Date Value Ref Range Status    External Amphetamine Screen Urine 03/28/2022 Negative   Final    External Benzodiazepine Screen Uri* 03/28/2022 Negative   Final    External Cocaine Screen Urine 03/28/2022 Negative   Final    External THC Screen Urine 03/28/2022 Negative   Final    External Methadone Screen Urine 03/28/2022 Negative   Final    External Methamphetamine Screen Ur* 03/28/2022 Negative   Final    External Oxycodone Screen Urine 03/28/2022 Negative   Final    External Buprenorphine Screen Urine 03/28/2022 Negative   Final    External MDMA 03/28/2022 Negative   Final    External Opiates Screen Urine 03/28/2022 Negative   Final       Assessment & Plan   Diagnoses and all orders for this visit:    1. Generalized anxiety disorder (Primary)  -     escitalopram (LEXAPRO) 20 MG tablet; Take 1 tablet by mouth Daily.  Dispense: 90 tablet; Refill: 0  -     QUEtiapine (SEROquel) 200 MG tablet; Take 1 tablet by mouth every night at bedtime.  Dispense: 90 tablet; Refill: 0    2. Moderate episode of recurrent major depressive disorder  -      buPROPion XL (WELLBUTRIN XL) 300 MG 24 hr tablet; Take 1 tablet by mouth Every Morning for 90 days.  Dispense: 90 tablet; Refill: 0  -     escitalopram (LEXAPRO) 20 MG tablet; Take 1 tablet by mouth Daily.  Dispense: 90 tablet; Refill: 0    3. Panic disorder with agoraphobia  -     escitalopram (LEXAPRO) 20 MG tablet; Take 1 tablet by mouth Daily.  Dispense: 90 tablet; Refill: 0    4. Post traumatic stress disorder (PTSD)  -     prazosin (MINIPRESS) 5 MG capsule; Take 2 capsules by mouth Every Night for 90 days.  Dispense: 180 capsule; Refill: 0  -     QUEtiapine (SEROquel) 200 MG tablet; Take 1 tablet by mouth every night at bedtime.  Dispense: 90 tablet; Refill: 0    5. Insomnia due to mental disorder  -     prazosin (MINIPRESS) 5 MG capsule; Take 2 capsules by mouth Every Night for 90 days.  Dispense: 180 capsule; Refill: 0  -     QUEtiapine (SEROquel) 200 MG tablet; Take 1 tablet by mouth every night at bedtime.  Dispense: 90 tablet; Refill: 0    6. Medication management        -Continue escitalopram 20 mg nightly for anxiety and depression  -Continue bupropion  mg daily for depression  -Continue quetiapine 200 mg nightly for mood and sleep. Lengthy discussion with patient on the possible side effects of antipsychotic medications including increased cholesterol, increased blood sugar, and possibility of weight gain.  Also discussed the need to monitor lab work associated with this.  The risk of muscle movement disorders with this class of medication was also discussed.  -Continue prazosin 10 mg nightly for nightmares  -Encouraged patient to restart therapy, she is agreeable  -SALLIE reviewed and appropriate. Patient counseled on use of controlled substances  -The benefits of a healthy diet and exercise were discussed with patient, especially the positive effects they have on mental health. Patient encouraged to consider lifestyle modification regarding  diet and exercise patterns to maximize results of  mental health treatment.  -Reviewed previous available documentation  -Reviewed most recent available labs                Visit Diagnoses:    ICD-10-CM ICD-9-CM   1. Generalized anxiety disorder  F41.1 300.02   2. Moderate episode of recurrent major depressive disorder  F33.1 296.32   3. Panic disorder with agoraphobia  F40.01 300.21   4. Post traumatic stress disorder (PTSD)  F43.10 309.81   5. Insomnia due to mental disorder  F51.05 300.9     327.02   6. Medication management  Z79.899 V58.69       TREATMENT PLAN/GOALS: Continue supportive psychotherapy efforts and medications as indicated. Treatment and medication options discussed during today's visit. Patient acknowledged and verbally consented to continue with current treatment plan and was educated on the importance of compliance with treatment and follow-up appointments.    MEDICATION ISSUES:    Discussed medication options and treatment plan of prescribed medication as well as the risks, benefits, and side effects including potential falls, possible impaired driving and metabolic adversities among others. Patient is agreeable to call the office with any worsening of symptoms or onset of side effects. Patient is agreeable to call 911 or go to the nearest ER should he/she begin having SI/HI.     MEDS ORDERED DURING VISIT:  New Medications Ordered This Visit   Medications    buPROPion XL (WELLBUTRIN XL) 300 MG 24 hr tablet     Sig: Take 1 tablet by mouth Every Morning for 90 days.     Dispense:  90 tablet     Refill:  0    escitalopram (LEXAPRO) 20 MG tablet     Sig: Take 1 tablet by mouth Daily.     Dispense:  90 tablet     Refill:  0    prazosin (MINIPRESS) 5 MG capsule     Sig: Take 2 capsules by mouth Every Night for 90 days.     Dispense:  180 capsule     Refill:  0    QUEtiapine (SEROquel) 200 MG tablet     Sig: Take 1 tablet by mouth every night at bedtime.     Dispense:  90 tablet     Refill:  0       Return in about 3 months (around 9/11/2024), or if  symptoms worsen or fail to improve.               This document has been electronically signed by PO Oconnor  June 11, 2024 14:48 EDT    Part of this note may be an electronic transcription/translation of spoken language to printed text using the Dragon Dictation System.

## 2024-07-17 ENCOUNTER — TELEMEDICINE (OUTPATIENT)
Dept: PSYCHIATRY | Facility: CLINIC | Age: 64
End: 2024-07-17
Payer: MEDICARE

## 2024-07-17 DIAGNOSIS — F51.05 INSOMNIA DUE TO MENTAL DISORDER: ICD-10-CM

## 2024-07-17 DIAGNOSIS — F41.1 GENERALIZED ANXIETY DISORDER: ICD-10-CM

## 2024-07-17 DIAGNOSIS — F43.10 POST TRAUMATIC STRESS DISORDER (PTSD): ICD-10-CM

## 2024-07-17 DIAGNOSIS — F43.21 GRIEF REACTION: ICD-10-CM

## 2024-07-17 DIAGNOSIS — F33.1 MODERATE EPISODE OF RECURRENT MAJOR DEPRESSIVE DISORDER: Primary | ICD-10-CM

## 2024-07-17 DIAGNOSIS — F40.01 PANIC DISORDER WITH AGORAPHOBIA: ICD-10-CM

## 2024-07-17 PROCEDURE — 90834 PSYTX W PT 45 MINUTES: CPT | Performed by: SOCIAL WORKER

## 2024-07-23 NOTE — PROGRESS NOTES
MULTIDISCIPLINARY PROGRESS NOTE  Date of Service: 07/17/24  Time In: 10:00 AM           Time Out: 10:48 AM    DATA: Delphine Mcmillan met 1:1 with Hussein Cruz LCSW, FRANK for scheduled outpatient psychotherapy session.     Chief Compliant: Depression, anxiety, PTSD, grief    HPI: Patient reports she feels as though she is doing some better but states she continues to struggle with difficulties following the death of her father.  Patient reports ongoing symptoms of depression including depressed mood, anhedonia, anergia, feeling hopeless and helpless, difficulty controlling worry, and social isolation.  Patient also continues to struggle with anxiety including anxious mood, feeling on edge, feeling overwhelmed, mind goes blank, difficulty concentrating, and a sense of impending doom.  Patient reports symptoms of posttraumatic stress disorder appears to be relatively stable at this time and states this does not seem to be a significant issue.  Patient rates current symptoms of depression at a 4 and anxiety at a 5 on a scale of 1-10 with 10 being most severe.  Patient reports she continues to adhere to medication regimen as prescribed.  Patient adamantly convincingly denies suicidal ideation and vehemently denies any substance use.    CLNICAL MANEUVERING/INTERVENTION: Assisted patient in processing above session content; acknowledged and normalized patient’s thoughts, feelings, and concerns.  Discussed the therapy/patient relationship and welcomed patient back to treatment.  Also discussed the importance of regular attendance, active participation, and honesty to the treatment process.  Utilized cognitive behavioral therapy to assist the patient in exploring her core belief systems which were developed throughout her childhood which was difficult due to her mother's severe mental illness.  Also began to introduce the process of cognitive restructuring and assisted the patient with beginning to identify irrational  beliefs and challenging with factual counter arguments.  Also assisted the patient in processing her feelings of grief and the difficulty of managing her father's estate and validated her feelings.  Provided unconditional positive regard in a safe, supportive environment.    Allowed patient to freely discuss issues without interruption or judgment. Provided safe, confidential environment to facilitate the development of positive therapeutic relationship and encourage open, honest communication. Assisted patient in identifying increased risk factors which would indicate the need for higher level of care including thoughts to harm self or others, self-harming behavior, and/or binge drinking and encouraged patient to contact this office, call 911, or present to the nearest emergency room should any of these events occur. Discussed crisis intervention services and means to access.     ASSESSMENT: Patient appears to be more stable than when she saw the undersigned several years ago and appears to have less paranoia and avoidance behavior.  However, she continues to struggle with significant depression and anxiety and difficulty managing it distress.  The patient's symptomology is also complicated at this time due to ongoing grief following the recent death of her father.  The patient's symptomology continues to cause impairment in important areas of functioning.  As a result, the patient can be reasonably expected to benefit from treatment and would likely be at increased risk for decompensation otherwise.    Diagnoses and all orders for this visit:     Assessment & Plan   Diagnoses and all orders for this visit:    1. Moderate episode of recurrent major depressive disorder (Primary)    2. Generalized anxiety disorder    3. Panic disorder with agoraphobia    4. Post traumatic stress disorder (PTSD)    5. Insomnia due to mental disorder    6. Grief reaction        MENTAL STATUS EXAM:   Hygiene:   good  Cooperation:   Cooperative  Eye Contact:  Good  Psychomotor Behavior:  Appropriate  Affect:  Appropriate  Hopelessness: Denies  Speech:  Normal  Thought Process:  Goal directed  Thought Content:  Normal  Suicidal:  None  Homicidal:  None  Hallucinations:  None  Delusion:  None  Memory:  Intact  Orientation:  Person, Place, and Time  Reliability:  good  Insight:  Fair  Judgement:  Good  Impulse Control:  Fair  Physical/Medical Issues:  No     Patient's Support Network Includes:  and children     Progress toward goal: Not at goal     Functional Status: Moderate impairment     Prognosis: Guarded with Ongoing Treatment    Return in about 3 weeks (around 8/7/2024) for Next scheduled follow up.      Plan:              PLAN: Patient will continue in bi-weekly outpatient psychotherapy sessions at the [facility] and pharmacotherapy as scheduled with PO Romero. Patient will contact this office, call 911 or present to the nearest emergency room should suicidal ideations occur. Provide Cognitive Behavioral Therapy and Solution Focused Therapy to improve functioning, maintain stability, and avoid decompensation and the need for higher level of care.       This document signed by Hussein Cruz LCSW, FRANK July 23, 2024 06:59 EDT

## 2024-09-09 ENCOUNTER — TELEMEDICINE (OUTPATIENT)
Dept: PSYCHIATRY | Facility: CLINIC | Age: 64
End: 2024-09-09
Payer: MEDICARE

## 2024-09-09 DIAGNOSIS — Z79.899 MEDICATION MANAGEMENT: ICD-10-CM

## 2024-09-09 DIAGNOSIS — F43.10 POST TRAUMATIC STRESS DISORDER (PTSD): ICD-10-CM

## 2024-09-09 DIAGNOSIS — F40.01 PANIC DISORDER WITH AGORAPHOBIA: ICD-10-CM

## 2024-09-09 DIAGNOSIS — F41.1 GENERALIZED ANXIETY DISORDER: Primary | ICD-10-CM

## 2024-09-09 DIAGNOSIS — F51.05 INSOMNIA DUE TO MENTAL DISORDER: ICD-10-CM

## 2024-09-09 DIAGNOSIS — F33.1 MODERATE EPISODE OF RECURRENT MAJOR DEPRESSIVE DISORDER: ICD-10-CM

## 2024-09-09 PROCEDURE — 99214 OFFICE O/P EST MOD 30 MIN: CPT | Performed by: NURSE PRACTITIONER

## 2024-09-09 PROCEDURE — 1160F RVW MEDS BY RX/DR IN RCRD: CPT | Performed by: NURSE PRACTITIONER

## 2024-09-09 PROCEDURE — 1159F MED LIST DOCD IN RCRD: CPT | Performed by: NURSE PRACTITIONER

## 2024-09-09 RX ORDER — BUPROPION HYDROCHLORIDE 300 MG/1
300 TABLET ORAL EVERY MORNING
Qty: 90 TABLET | Refills: 0 | Status: SHIPPED | OUTPATIENT
Start: 2024-09-09 | End: 2024-12-08

## 2024-09-09 RX ORDER — ESCITALOPRAM OXALATE 20 MG/1
20 TABLET ORAL DAILY
Qty: 90 TABLET | Refills: 0 | Status: SHIPPED | OUTPATIENT
Start: 2024-09-09

## 2024-09-09 RX ORDER — PRAZOSIN HYDROCHLORIDE 5 MG/1
10 CAPSULE ORAL NIGHTLY
Qty: 180 CAPSULE | Refills: 0 | Status: SHIPPED | OUTPATIENT
Start: 2024-09-09 | End: 2024-12-08

## 2024-09-09 RX ORDER — QUETIAPINE FUMARATE 200 MG/1
200 TABLET, FILM COATED ORAL
Qty: 90 TABLET | Refills: 0 | Status: SHIPPED | OUTPATIENT
Start: 2024-09-09

## 2024-09-09 NOTE — PROGRESS NOTES
This provider is located at the [facility], [address] [place] [address], [id number] using a secure [employer] Video Visit through [employer]. Patient is being seen remotely via telehealth at their home address in [place], and stated they are in a secure environment for this session. The patient's condition being diagnosed/treated is appropriate for telemedicine. The provider identified herself as well as her credentials.   The patient, and/or patients guardian, consent to be seen remotely, and when consent is given they understand that the consent allows for patient identifiable information to be sent to a third party as needed.   They may refuse to be seen remotely at any time. The electronic data is encrypted and password protected, and the patient and/or guardian has been advised of the potential risks to privacy not withstanding such measures.    Ana Mcmillan is a 64 y.o. female who presents today for follow up    Chief Complaint:  Depression    History of Present Illness: Patient presents as follow up via telehealth visit. She reports the situation with her family and nephew has calmed down as he is currently in [facility]. States she is less concerned that he will show up to court and harass her and her niece. She is continuing therapy which has been helpful. She reports sleep is good. Reports appetite is good. Reports medications are working well, denies any side effects. She denies SI/HI/AVH.    The following portions of the patient's history were reviewed and updated as appropriate: allergies, current medications, past family history, past medical history, past social history, past surgical history and problem list.      Past Medical History:  Past Medical History:   Diagnosis Date    Anxiety     Chronic pain disorder     Depression     Disease of thyroid gland     Hyperlipidemia     Type 2 diabetes mellitus        Social History:  Social History     Socioeconomic History    Marital  status:    Tobacco Use    Smoking status: Never    Smokeless tobacco: Never   Substance and Sexual Activity    Alcohol use: No    Drug use: No    Sexual activity: Defer       Family History:  Family History   Problem Relation Age of Onset    Anxiety disorder Mother     Depression Mother     Anxiety disorder Sister     Depression Sister        Past Surgical History:  History reviewed. No pertinent surgical history.    Problem List:  Patient Active Problem List   Diagnosis    Morbidly obese        Allergy:   Allergies   Allergen Reactions    Codeine Hives and Itching    Acetaminophen Rash        Current Medications:   Current Outpatient Medications   Medication Sig Dispense Refill    atorvastatin (LIPITOR) 80 MG tablet       buPROPion XL (WELLBUTRIN XL) 300 MG 24 hr tablet Take 1 tablet by mouth Every Morning for 90 days. 90 tablet 0    escitalopram (LEXAPRO) 20 MG tablet Take 1 tablet by mouth Daily. 90 tablet 0    glipiZIDE XL 10 MG 24 hr tablet       levothyroxine (SYNTHROID, LEVOTHROID) 150 MCG tablet       omeprazole (priLOSEC) 20 MG capsule       prazosin (MINIPRESS) 5 MG capsule Take 2 capsules by mouth Every Night for 90 days. 180 capsule 0    QUEtiapine (SEROquel) 200 MG tablet Take 1 tablet by mouth every night at bedtime. 90 tablet 0     No current facility-administered medications for this visit.       Review of Symptoms:    Review of Systems   Constitutional:  Positive for fatigue.   HENT: Negative.     Eyes: Negative.    Respiratory: Negative.     Cardiovascular: Negative.    Gastrointestinal: Negative.    Neurological: Negative.    Psychiatric/Behavioral:  Positive for depressed mood and stress. Negative for suicidal ideas. The patient is nervous/anxious.          Physical Exam:   There were no vitals taken for this visit.  There is no height or weight on file to calculate BMI.    Appearance: Well nourished female, appropriately dressed, appears stated age and in no acute distress  Gait, Station,  Strength: FAVIAN    Mental Status Exam:   Hygiene:   good  Cooperation:  Cooperative  Eye Contact:  Good  Psychomotor Behavior:  Appropriate  Affect:  Appropriate  Mood: normal  Hopelessness: Denies  Speech:  Normal  Thought Process:  Linear  Thought Content:  Mood congruent  Suicidal:  None  Homicidal:  None  Hallucinations:  None  Delusion:  None  Memory:  Intact  Orientation:  Person, Place, Time, and Situation  Reliability:  good  Insight:  Good  Judgement:  Good  Impulse Control:  Good  Physical/Medical Issues:   see med hx      PHQ-Score Total:  PHQ-9 Total Score: (P) 14   Patient screened positive for depression based on a PHQ-9 score of 15 on 9/4/2024. Follow-up recommendations include: Prescribed antidepressant medication treatment and Suicide Risk Assessment performed.          Lab Results:   No visits with results within 1 Month(s) from this visit.   Latest known visit with results is:   Telemedicine on 03/28/2022   Component Date Value Ref Range Status    External Amphetamine Screen Urine 03/28/2022 Negative   Final    External Benzodiazepine Screen Uri* 03/28/2022 Negative   Final    External Cocaine Screen Urine 03/28/2022 Negative   Final    External THC Screen Urine 03/28/2022 Negative   Final    External Methadone Screen Urine 03/28/2022 Negative   Final    External Methamphetamine Screen Ur* 03/28/2022 Negative   Final    External Oxycodone Screen Urine 03/28/2022 Negative   Final    External Buprenorphine Screen Urine 03/28/2022 Negative   Final    External MDMA 03/28/2022 Negative   Final    External Opiates Screen Urine 03/28/2022 Negative   Final       Assessment & Plan   Diagnoses and all orders for this visit:    1. Generalized anxiety disorder (Primary)  -     escitalopram (LEXAPRO) 20 MG tablet; Take 1 tablet by mouth Daily.  Dispense: 90 tablet; Refill: 0  -     QUEtiapine (SEROquel) 200 MG tablet; Take 1 tablet by mouth every night at bedtime.  Dispense: 90 tablet; Refill: 0    2. Moderate  episode of recurrent major depressive disorder  -     buPROPion XL (WELLBUTRIN XL) 300 MG 24 hr tablet; Take 1 tablet by mouth Every Morning for 90 days.  Dispense: 90 tablet; Refill: 0  -     escitalopram (LEXAPRO) 20 MG tablet; Take 1 tablet by mouth Daily.  Dispense: 90 tablet; Refill: 0    3. Panic disorder with agoraphobia  -     escitalopram (LEXAPRO) 20 MG tablet; Take 1 tablet by mouth Daily.  Dispense: 90 tablet; Refill: 0    4. Post traumatic stress disorder (PTSD)  -     prazosin (MINIPRESS) 5 MG capsule; Take 2 capsules by mouth Every Night for 90 days.  Dispense: 180 capsule; Refill: 0  -     QUEtiapine (SEROquel) 200 MG tablet; Take 1 tablet by mouth every night at bedtime.  Dispense: 90 tablet; Refill: 0    5. Insomnia due to mental disorder  -     prazosin (MINIPRESS) 5 MG capsule; Take 2 capsules by mouth Every Night for 90 days.  Dispense: 180 capsule; Refill: 0  -     QUEtiapine (SEROquel) 200 MG tablet; Take 1 tablet by mouth every night at bedtime.  Dispense: 90 tablet; Refill: 0    6. Medication management        -Continue escitalopram 20 mg nightly for anxiety and depression  -Continue bupropion  mg daily for depression  -Continue quetiapine 200 mg nightly for mood and sleep. Lengthy discussion with patient on the possible side effects of antipsychotic medications including increased cholesterol, increased blood sugar, and possibility of weight gain.  Also discussed the need to monitor lab work associated with this.  The risk of muscle movement disorders with this class of medication was also discussed.  -Continue prazosin 10 mg nightly for nightmares  -Encouraged patient to restart therapy, she is agreeable  -SALLIE reviewed and appropriate. Patient counseled on use of controlled substances  -The benefits of a healthy diet and exercise were discussed with patient, especially the positive effects they have on mental health. Patient encouraged to consider lifestyle modification regarding   diet and exercise patterns to maximize results of mental health treatment.  -Reviewed previous available documentation  -Reviewed most recent available labs                Visit Diagnoses:    ICD-10-CM ICD-9-CM   1. Generalized anxiety disorder  F41.1 300.02   2. Moderate episode of recurrent major depressive disorder  F33.1 296.32   3. Panic disorder with agoraphobia  F40.01 300.21   4. Post traumatic stress disorder (PTSD)  F43.10 309.81   5. Insomnia due to mental disorder  F51.05 300.9     327.02   6. Medication management  Z79.899 V58.69       TREATMENT PLAN/GOALS: Continue supportive psychotherapy efforts and medications as indicated. Treatment and medication options discussed during today's visit. Patient acknowledged and verbally consented to continue with current treatment plan and was educated on the importance of compliance with treatment and follow-up appointments.    MEDICATION ISSUES:    Discussed medication options and treatment plan of prescribed medication as well as the risks, benefits, and side effects including potential falls, possible impaired driving and metabolic adversities among others. Patient is agreeable to call the office with any worsening of symptoms or onset of side effects. Patient is agreeable to call 911 or go to the nearest ER should he/she begin having SI/HI.     MEDS ORDERED DURING VISIT:  New Medications Ordered This Visit   Medications    buPROPion XL (WELLBUTRIN XL) 300 MG 24 hr tablet     Sig: Take 1 tablet by mouth Every Morning for 90 days.     Dispense:  90 tablet     Refill:  0    escitalopram (LEXAPRO) 20 MG tablet     Sig: Take 1 tablet by mouth Daily.     Dispense:  90 tablet     Refill:  0    prazosin (MINIPRESS) 5 MG capsule     Sig: Take 2 capsules by mouth Every Night for 90 days.     Dispense:  180 capsule     Refill:  0    QUEtiapine (SEROquel) 200 MG tablet     Sig: Take 1 tablet by mouth every night at bedtime.     Dispense:  90 tablet     Refill:  0        Return in about 3 months (around 12/9/2024), or if symptoms worsen or fail to improve.               This document has been electronically signed by PO Oconnor  September 9, 2024 13:57 EDT    Part of this note may be an electronic transcription/translation of spoken language to printed text using the Dragon Dictation System.

## 2024-09-11 ENCOUNTER — TELEMEDICINE (OUTPATIENT)
Dept: PSYCHIATRY | Facility: CLINIC | Age: 64
End: 2024-09-11
Payer: MEDICARE

## 2024-09-11 DIAGNOSIS — R46.89 AVOIDANT BEHAVIOR: ICD-10-CM

## 2024-09-11 DIAGNOSIS — F40.01 PANIC DISORDER WITH AGORAPHOBIA: ICD-10-CM

## 2024-09-11 DIAGNOSIS — F51.05 INSOMNIA DUE TO MENTAL DISORDER: ICD-10-CM

## 2024-09-11 DIAGNOSIS — F33.1 MODERATE EPISODE OF RECURRENT MAJOR DEPRESSIVE DISORDER: Primary | ICD-10-CM

## 2024-09-11 DIAGNOSIS — F41.1 GENERALIZED ANXIETY DISORDER: ICD-10-CM

## 2024-09-11 DIAGNOSIS — F43.10 POST TRAUMATIC STRESS DISORDER (PTSD): ICD-10-CM

## 2024-10-04 NOTE — PROGRESS NOTES
"Date of Service: June 5, 2024  Time In: 2:50 PM  Time Out: 3:30 PM        IDENTIFYING INFORMATION:   Delphine Mcmillan  is a 64 y.o. female who is here today for initial appointment.     CHIEF COMPLIANT: \"Grief, anxiety, depression\"    HPI: Patient reports she feels as though she made significant progress while seeing the undersigned in the past but states that the recent death of her father has exacerbated her symptoms.  Patient reports she continues to struggle with avoidant behavior and states she avoids going most places if she can.  Patient states she does not like being around people and states this causes her a great deal of distress.  Patient reports stress concerning the death of her father on April 3, 2024.  The patient reports she is also struggling with feelings of guilt that she becomes frustrated with her sister who has schizophrenia and states has difficulty talking to her daily.  The patient reports her sister calls her multiple times daily and repeats the same things over and over and the patient states she feels guilty for becoming frustrated with her.  Patient also reports ongoing difficulty dealing with her father's estate and states this is simply causes her to feel overwhelmed.  Patient reports ongoing symptoms of depression including depressed mood, anhedonia, anergia, feelings of hopelessness and helplessness, and a significant sense of uncertainty.  She reports anxiety including anxious mood, feeling on edge, difficulty concentrating, difficulty controlling obsessive worry, and a significant sense of impending doom.  Patient denies history of attention deficit disorder, or perceptual disturbance.      PAST PSYCHIATRIC HISTORY: Patient under the care of the undersigned up until approximately 1 year ago and continues to be under the care of OP Romero.      SUBSTANCE ABUSE HISTORY: None reported      MEDICAL HISTORY: See medical record      CURRENT MEDICATIONS:  Current Outpatient " "Medications   Medication Sig Dispense Refill    atorvastatin (LIPITOR) 80 MG tablet       buPROPion XL (WELLBUTRIN XL) 300 MG 24 hr tablet Take 1 tablet by mouth Every Morning for 90 days. 90 tablet 0    escitalopram (LEXAPRO) 20 MG tablet Take 1 tablet by mouth Daily. 90 tablet 0    glipiZIDE XL 10 MG 24 hr tablet       levothyroxine (SYNTHROID, LEVOTHROID) 150 MCG tablet       omeprazole (priLOSEC) 20 MG capsule       prazosin (MINIPRESS) 5 MG capsule Take 2 capsules by mouth Every Night for 90 days. 180 capsule 0    QUEtiapine (SEROquel) 200 MG tablet Take 1 tablet by mouth every night at bedtime. 90 tablet 0     No current facility-administered medications for this visit.         FAMILY HISTORY: Patient reports her mother and sister have significant history of schizophrenia.      SOCIAL HISTORY: Patient grew up the family of origin and states it was very difficult as her mother was very sick with severe schizophrenia.  The patient continues to live with her  of many years in the [facility] and states she continues to be very involved with her family including caring for her grandchildren on a regular basis.  However, the patient admits she spends most of her time at home and states this is her \"safe place.\"  Patient has not been in  and has not been arrested.    Assessment & Plan   Diagnoses and all orders for this visit:    1. Moderate episode of recurrent major depressive disorder (Primary)    2. Generalized anxiety disorder    3. Panic disorder with agoraphobia    4. Grief reaction      Return in about 3 weeks (around 6/26/2024) for Next scheduled follow up.        MENTAL STATUS EXAM:   Hygiene:   good  Cooperation:  Cooperative  Eye Contact:  Fair  Psychomotor Behavior:  Appropriate  Affect:  Appropriate  Hopelessness: Denies  Speech:  Normal  Thought Process:  Goal directed  Thought Content:  Normal  Suicidal:  None  Homicidal:  None  Hallucinations:  None  Delusion:  None  Memory:  " Intact  Orientation:  Person, Place, and Time  Reliability:  fair  Insight:  Fair  Judgement:  Fair  Impulse Control:  Fair  Physical/Medical Issues:  No     PROBLEM LIST:   Depression  Anxiety  Grief    STRENGTHS:   Willingness to seek treatment, positive therapeutic relationship with provider, stable housing, send possibility of [place], the social support    WEAKNESSES:  Chronic mental illness, social isolation, poor coping skills, limited support network patient will maintain stability and avoid higher level of care.      SHORT-TERM GOALS: Patient will be compliant with clinic appointments.  Patient will be engaged in therapy, medication compliant with minimal side effects. Patient  will report decrease of symptoms and frequency.    LONG-TERM GOALS: Patient will have cessation of symptoms and be able to function at optimal levels without continued treatment.     PLAN:   Patient will continue in individual outpatient psychotherapy session at the [facility] and will continue in pharmacotherapy as scheduled.    The patient was instructed to contact the clinic, call 911, or present to the nearest emergency room if crisis occurs.       Hussein Cruz LCSW, FRANK     This document signed by Hussein Cruz LCSW, FRANK October 4, 2024 08:42 EDT

## 2024-10-12 NOTE — PROGRESS NOTES
MULTIDISCIPLINARY PROGRESS NOTE  Date of Service: September 11, 2024  Time In: 10:00 AM          Time Out: 10:45 AM    DATA: Delphine Mcmillan met 1:1 with Hussein Cruz LCSW, FRANK for scheduled outpatient psychotherapy session.     Chief Compliant: Depression, anxiety, PTSD, grief    HPI: Patient reports her father-in-law recently fell and is in the hospital and has been told he will be placed on hospice.  Patient states this is very stressful as she has lived next to him for more than 40 years. Patient reports ongoing symptoms of depression including depressed mood, anhedonia, anergia, feeling hopeless and helpless, difficulty controlling worry, and social isolation.  Patient also continues to struggle with anxiety including anxious mood, feeling on edge, feeling overwhelmed, mind goes blank, difficulty concentrating, and a sense of impending doom.  Patient reports symptoms of posttraumatic stress disorder appears to be relatively stable at this time and states this does not seem to be a significant issue.  Patient rates current symptoms of depression at a 5 and anxiety at a 5 on a scale of 1-10 with 10 being most severe.  Patient reports she feels she does better with getting out in public but states she does continue to have avoidant behavior and stays at home as much as possible.  Patient reports she continues to adhere to medication regimen as prescribed.  Patient adamantly convincingly denies suicidal ideation and vehemently denies any substance use.    CLNICAL MANEUVERING/INTERVENTION: Assisted patient in processing above session content; acknowledged and normalized patient’s thoughts, feelings, and concerns.  Allowed the patient to discuss/process her feelings concerning her father-in-law's illness and validated her feelings.  Utilized cognitive behavioral therapy to assist the patient in exploring her core belief systems which were developed throughout her childhood which was difficult due to her mother's  severe mental illness.  Also began to introduce the process of cognitive restructuring and assisted the patient with beginning to identify irrational beliefs and challenging with factual counter arguments.  Also assisted the patient in processing her feelings of grief and the difficulty of managing her father's estate and validated her feelings.  Provided unconditional positive regard in a safe, supportive environment.    Allowed patient to freely discuss issues without interruption or judgment. Provided safe, confidential environment to facilitate the development of positive therapeutic relationship and encourage open, honest communication. Assisted patient in identifying increased risk factors which would indicate the need for higher level of care including thoughts to harm self or others, self-harming behavior, and/or binge drinking and encouraged patient to contact this office, call 911, or present to the nearest emergency room should any of these events occur. Discussed crisis intervention services and means to access.     ASSESSMENT: Patient appears to be more stable than when she saw the undersigned several years ago and appears to have less paranoia and avoidance behavior.  However, she continues to struggle with significant depression and anxiety and difficulty managing it distress.  The patient's symptomology is also complicated at this time due to ongoing grief following the recent death of her father.  The patient's symptomology continues to cause impairment in important areas of functioning.  As a result, the patient can be reasonably expected to benefit from treatment and would likely be at increased risk for decompensation otherwise.    Diagnoses and all orders for this visit:     Assessment & Plan   Diagnoses and all orders for this visit:    1. Moderate episode of recurrent major depressive disorder (Primary)    2. Generalized anxiety disorder    3. Panic disorder with agoraphobia    4. Post  traumatic stress disorder (PTSD)    5. Insomnia due to mental disorder    6. Avoidant behavior        MENTAL STATUS EXAM:   Hygiene:   good  Cooperation:  Cooperative  Eye Contact:  Good  Psychomotor Behavior:  Appropriate  Affect:  Appropriate  Hopelessness: Denies  Speech:  Normal  Thought Process:  Goal directed  Thought Content:  Normal  Suicidal:  None  Homicidal:  None  Hallucinations:  None  Delusion:  None  Memory:  Intact  Orientation:  Person, Place, and Time  Reliability:  good  Insight:  Fair  Judgement:  Good  Impulse Control:  Fair  Physical/Medical Issues:  No     Patient's Support Network Includes:  and children     Progress toward goal: Not at goal     Functional Status: Moderate impairment     Prognosis: Guarded with Ongoing Treatment    Return in about 3 weeks (around 10/2/2024) for Next scheduled follow up.      Plan:              PLAN: Patient will continue in bi-weekly outpatient psychotherapy sessions at the [facility] and pharmacotherapy as scheduled with PO Romero. Patient will contact this office, call 911 or present to the nearest emergency room should suicidal ideations occur. Provide Cognitive Behavioral Therapy and Solution Focused Therapy to improve functioning, maintain stability, and avoid decompensation and the need for higher level of care.       This document signed by Hussein Cruz LCSW, FRANK October 12, 2024 09:13 EDT

## 2024-12-09 ENCOUNTER — TELEMEDICINE (OUTPATIENT)
Dept: PSYCHIATRY | Facility: CLINIC | Age: 64
End: 2024-12-09
Payer: MEDICARE

## 2024-12-09 DIAGNOSIS — F51.05 INSOMNIA DUE TO MENTAL DISORDER: ICD-10-CM

## 2024-12-09 DIAGNOSIS — Z79.899 MEDICATION MANAGEMENT: ICD-10-CM

## 2024-12-09 DIAGNOSIS — R46.89 AVOIDANT BEHAVIOR: ICD-10-CM

## 2024-12-09 DIAGNOSIS — F33.1 MODERATE EPISODE OF RECURRENT MAJOR DEPRESSIVE DISORDER: Primary | ICD-10-CM

## 2024-12-09 DIAGNOSIS — F43.10 POST TRAUMATIC STRESS DISORDER (PTSD): ICD-10-CM

## 2024-12-09 DIAGNOSIS — F40.01 PANIC DISORDER WITH AGORAPHOBIA: ICD-10-CM

## 2024-12-09 DIAGNOSIS — F41.1 GENERALIZED ANXIETY DISORDER: ICD-10-CM

## 2024-12-09 PROCEDURE — 99214 OFFICE O/P EST MOD 30 MIN: CPT | Performed by: NURSE PRACTITIONER

## 2024-12-09 PROCEDURE — 1159F MED LIST DOCD IN RCRD: CPT | Performed by: NURSE PRACTITIONER

## 2024-12-09 PROCEDURE — 1160F RVW MEDS BY RX/DR IN RCRD: CPT | Performed by: NURSE PRACTITIONER

## 2024-12-09 RX ORDER — ESCITALOPRAM OXALATE 20 MG/1
20 TABLET ORAL DAILY
Qty: 90 TABLET | Refills: 0 | Status: SHIPPED | OUTPATIENT
Start: 2024-12-09

## 2024-12-09 RX ORDER — QUETIAPINE FUMARATE 200 MG/1
200 TABLET, FILM COATED ORAL
Qty: 90 TABLET | Refills: 0 | Status: SHIPPED | OUTPATIENT
Start: 2024-12-09

## 2024-12-09 RX ORDER — BUPROPION HYDROCHLORIDE 300 MG/1
300 TABLET ORAL EVERY MORNING
Qty: 90 TABLET | Refills: 0 | Status: SHIPPED | OUTPATIENT
Start: 2024-12-09 | End: 2025-03-09

## 2024-12-09 RX ORDER — PRAZOSIN HYDROCHLORIDE 5 MG/1
10 CAPSULE ORAL NIGHTLY
Qty: 180 CAPSULE | Refills: 0 | Status: SHIPPED | OUTPATIENT
Start: 2024-12-09 | End: 2025-03-09

## 2024-12-09 NOTE — PROGRESS NOTES
This provider is located at the [facility], [address] [place] [address], [id number] using a secure [employer] Video Visit through [employer]. Patient is being seen remotely via telehealth at their home address in [place], and stated they are in a secure environment for this session. The patient's condition being diagnosed/treated is appropriate for telemedicine. The provider identified herself as well as her credentials.   The patient, and/or patients guardian, consent to be seen remotely, and when consent is given they understand that the consent allows for patient identifiable information to be sent to a third party as needed.   They may refuse to be seen remotely at any time. The electronic data is encrypted and password protected, and the patient and/or guardian has been advised of the potential risks to privacy not withstanding such measures.  Mode of Visit: Video  Location of patient: -HOME-  Location of provider: +[facility]+  You have chosen to receive care through a telehealth visit.  The patient has signed the video visit consent form.  The visit included audio and video interaction. No technical issues occurred during this visit.    Subjective   Delphine Mcmillan is a 64 y.o. female who presents today for follow up    Chief Complaint:  Depression    History of Present Illness: Patient presents as follow-up via telehealth visit.  Reports father-in-law passed away last month which has added to her anxiety and depression.  States she does not want to celebrate [place] but she also feels guilty if she lets her grandchildren down.  States that her granddaughter is having a birthday party in [place] soon however she does not like to leave her home and does not want to upset her family which is also leaving her in distress.  Reports from a medication standpoint she feels meds are working good, states she plans to follow up with therapist soon.  Reports sleep is good.  Reports appetite is good.   She denies SI/HI/AVH.    The following portions of the patient's history were reviewed and updated as appropriate: allergies, current medications, past family history, past medical history, past social history, past surgical history and problem list.      Past Medical History:  Past Medical History:   Diagnosis Date    Anxiety     Chronic pain disorder     Depression     Disease of thyroid gland     Hyperlipidemia     Type 2 diabetes mellitus        Social History:  Social History     Socioeconomic History    Marital status:    Tobacco Use    Smoking status: Never    Smokeless tobacco: Never   Substance and Sexual Activity    Alcohol use: No    Drug use: No    Sexual activity: Defer       Family History:  Family History   Problem Relation Age of Onset    Anxiety disorder Mother     Depression Mother     Anxiety disorder Sister     Depression Sister        Past Surgical History:  History reviewed. No pertinent surgical history.    Problem List:  Patient Active Problem List   Diagnosis    Morbidly obese        Allergy:   Allergies   Allergen Reactions    Codeine Hives and Itching    Acetaminophen Rash        Current Medications:   Current Outpatient Medications   Medication Sig Dispense Refill    atorvastatin (LIPITOR) 80 MG tablet       buPROPion XL (WELLBUTRIN XL) 300 MG 24 hr tablet Take 1 tablet by mouth Every Morning for 90 days. 90 tablet 0    escitalopram (LEXAPRO) 20 MG tablet Take 1 tablet by mouth Daily. 90 tablet 0    glipiZIDE XL 10 MG 24 hr tablet       levothyroxine (SYNTHROID, LEVOTHROID) 150 MCG tablet       omeprazole (priLOSEC) 20 MG capsule       prazosin (MINIPRESS) 5 MG capsule Take 2 capsules by mouth Every Night for 90 days. 180 capsule 0    QUEtiapine (SEROquel) 200 MG tablet Take 1 tablet by mouth every night at bedtime. 90 tablet 0     No current facility-administered medications for this visit.       Review of Symptoms:    Review of Systems   Constitutional:  Positive for fatigue.    HENT: Negative.     Eyes: Negative.    Respiratory: Negative.     Cardiovascular: Negative.    Gastrointestinal: Negative.    Neurological: Negative.    Psychiatric/Behavioral:  Positive for sleep disturbance, depressed mood and stress. Negative for suicidal ideas. The patient is nervous/anxious.          Physical Exam:   There were no vitals taken for this visit.  There is no height or weight on file to calculate BMI.    Appearance: Well nourished female, appropriately dressed, appears stated age and in no acute distress  Gait, Station, Strength: FAVIAN    Mental Status Exam:   Hygiene:   good  Cooperation:  Cooperative  Eye Contact:  Good  Psychomotor Behavior:  Appropriate  Affect:  Appropriate  Mood: sad, depressed, and anxious  Hopelessness: 5  Speech:  Normal  Thought Process:  Linear  Thought Content:  Mood congruent  Suicidal:  None  Homicidal:  None  Hallucinations:  None  Delusion:  None  Memory:  Intact  Orientation:  Person, Place, Time, and Situation  Reliability:  good  Insight:  Good  Judgement:  Good  Impulse Control:  Good  Physical/Medical Issues:  Yes See med hx      PHQ-Score Total:  PHQ-9 Total Score: (Patient-Rptd) 16   Patient screened positive for depression based on a PHQ-9 score of 16 on 12/9/2024. Follow-up recommendations include: Prescribed antidepressant medication treatment and Suicide Risk Assessment performed.          Lab Results:   No visits with results within 1 Month(s) from this visit.   Latest known visit with results is:   Telemedicine on 03/28/2022   Component Date Value Ref Range Status    External Amphetamine Screen Urine 03/28/2022 Negative   Final    External Benzodiazepine Screen Uri* 03/28/2022 Negative   Final    External Cocaine Screen Urine 03/28/2022 Negative   Final    External THC Screen Urine 03/28/2022 Negative   Final    External Methadone Screen Urine 03/28/2022 Negative   Final    External Methamphetamine Screen Ur* 03/28/2022 Negative   Final    External  Oxycodone Screen Urine 03/28/2022 Negative   Final    External Buprenorphine Screen Urine 03/28/2022 Negative   Final    External MDMA 03/28/2022 Negative   Final    External Opiates Screen Urine 03/28/2022 Negative   Final       Assessment & Plan   Diagnoses and all orders for this visit:    1. Moderate episode of recurrent major depressive disorder (Primary)  -     buPROPion XL (WELLBUTRIN XL) 300 MG 24 hr tablet; Take 1 tablet by mouth Every Morning for 90 days.  Dispense: 90 tablet; Refill: 0  -     escitalopram (LEXAPRO) 20 MG tablet; Take 1 tablet by mouth Daily.  Dispense: 90 tablet; Refill: 0    2. Generalized anxiety disorder  -     escitalopram (LEXAPRO) 20 MG tablet; Take 1 tablet by mouth Daily.  Dispense: 90 tablet; Refill: 0  -     QUEtiapine (SEROquel) 200 MG tablet; Take 1 tablet by mouth every night at bedtime.  Dispense: 90 tablet; Refill: 0    3. Panic disorder with agoraphobia  -     escitalopram (LEXAPRO) 20 MG tablet; Take 1 tablet by mouth Daily.  Dispense: 90 tablet; Refill: 0    4. Post traumatic stress disorder (PTSD)  -     prazosin (MINIPRESS) 5 MG capsule; Take 2 capsules by mouth Every Night for 90 days.  Dispense: 180 capsule; Refill: 0  -     QUEtiapine (SEROquel) 200 MG tablet; Take 1 tablet by mouth every night at bedtime.  Dispense: 90 tablet; Refill: 0    5. Insomnia due to mental disorder  -     prazosin (MINIPRESS) 5 MG capsule; Take 2 capsules by mouth Every Night for 90 days.  Dispense: 180 capsule; Refill: 0  -     QUEtiapine (SEROquel) 200 MG tablet; Take 1 tablet by mouth every night at bedtime.  Dispense: 90 tablet; Refill: 0    6. Medication management    7. Avoidant behavior        -Continue escitalopram 20 mg nightly for anxiety and depression  -Continue bupropion  mg daily for depression  -Continue quetiapine 200 mg nightly for mood and sleep. Lengthy discussion with patient on the possible side effects of antipsychotic medications including increased  cholesterol, increased blood sugar, and possibility of weight gain.  Also discussed the need to monitor lab work associated with this.  The risk of muscle movement disorders with this class of medication was also discussed.  -Continue prazosin 10 mg nightly for nightmares  -Encouraged patient to restart therapy, she is agreeable  -SALLIE reviewed and appropriate. Patient counseled on use of controlled substances  -The benefits of a healthy diet and exercise were discussed with patient, especially the positive effects they have on mental health. Patient encouraged to consider lifestyle modification regarding  diet and exercise patterns to maximize results of mental health treatment.  -Reviewed previous available documentation  -Reviewed most recent available labs                Visit Diagnoses:    ICD-10-CM ICD-9-CM   1. Moderate episode of recurrent major depressive disorder  F33.1 296.32   2. Generalized anxiety disorder  F41.1 300.02   3. Panic disorder with agoraphobia  F40.01 300.21   4. Post traumatic stress disorder (PTSD)  F43.10 309.81   5. Insomnia due to mental disorder  F51.05 300.9     327.02   6. Medication management  Z79.899 V58.69   7. Avoidant behavior  R46.89 V40.39       TREATMENT PLAN/GOALS: Continue supportive psychotherapy efforts and medications as indicated. Treatment and medication options discussed during today's visit. Patient acknowledged and verbally consented to continue with current treatment plan and was educated on the importance of compliance with treatment and follow-up appointments.    MEDICATION ISSUES:    Discussed medication options and treatment plan of prescribed medication as well as the risks, benefits, and side effects including potential falls, possible impaired driving and metabolic adversities among others. Patient is agreeable to call the office with any worsening of symptoms or onset of side effects. Patient is agreeable to call 911 or go to the nearest ER should  he/she begin having SI/HI.     MEDS ORDERED DURING VISIT:  New Medications Ordered This Visit   Medications    buPROPion XL (WELLBUTRIN XL) 300 MG 24 hr tablet     Sig: Take 1 tablet by mouth Every Morning for 90 days.     Dispense:  90 tablet     Refill:  0    escitalopram (LEXAPRO) 20 MG tablet     Sig: Take 1 tablet by mouth Daily.     Dispense:  90 tablet     Refill:  0    prazosin (MINIPRESS) 5 MG capsule     Sig: Take 2 capsules by mouth Every Night for 90 days.     Dispense:  180 capsule     Refill:  0    QUEtiapine (SEROquel) 200 MG tablet     Sig: Take 1 tablet by mouth every night at bedtime.     Dispense:  90 tablet     Refill:  0       Return in about 3 months (around 3/9/2025), or if symptoms worsen or fail to improve.                 This document has been electronically signed by PO Oconnor  December 9, 2024 14:06 EST    Part of this note may be an electronic transcription/translation of spoken language to printed text using the Dragon Dictation System.

## 2025-03-10 ENCOUNTER — TELEMEDICINE (OUTPATIENT)
Dept: PSYCHIATRY | Facility: CLINIC | Age: 65
End: 2025-03-10
Payer: MEDICARE

## 2025-03-10 DIAGNOSIS — F43.10 POST TRAUMATIC STRESS DISORDER (PTSD): ICD-10-CM

## 2025-03-10 DIAGNOSIS — F40.01 PANIC DISORDER WITH AGORAPHOBIA: ICD-10-CM

## 2025-03-10 DIAGNOSIS — F51.05 INSOMNIA DUE TO MENTAL DISORDER: ICD-10-CM

## 2025-03-10 DIAGNOSIS — F41.1 GENERALIZED ANXIETY DISORDER: ICD-10-CM

## 2025-03-10 DIAGNOSIS — F33.1 MODERATE EPISODE OF RECURRENT MAJOR DEPRESSIVE DISORDER: Primary | ICD-10-CM

## 2025-03-10 DIAGNOSIS — Z79.899 MEDICATION MANAGEMENT: ICD-10-CM

## 2025-03-10 DIAGNOSIS — R46.89 AVOIDANT BEHAVIOR: ICD-10-CM

## 2025-03-10 PROCEDURE — 96127 BRIEF EMOTIONAL/BEHAV ASSMT: CPT | Performed by: NURSE PRACTITIONER

## 2025-03-10 PROCEDURE — 1160F RVW MEDS BY RX/DR IN RCRD: CPT | Performed by: NURSE PRACTITIONER

## 2025-03-10 PROCEDURE — 99214 OFFICE O/P EST MOD 30 MIN: CPT | Performed by: NURSE PRACTITIONER

## 2025-03-10 PROCEDURE — 1159F MED LIST DOCD IN RCRD: CPT | Performed by: NURSE PRACTITIONER

## 2025-03-10 RX ORDER — QUETIAPINE FUMARATE 200 MG/1
200 TABLET, FILM COATED ORAL
Qty: 90 TABLET | Refills: 0 | Status: SHIPPED | OUTPATIENT
Start: 2025-03-10

## 2025-03-10 RX ORDER — BUPROPION HYDROCHLORIDE 300 MG/1
300 TABLET ORAL EVERY MORNING
Qty: 90 TABLET | Refills: 0 | Status: SHIPPED | OUTPATIENT
Start: 2025-03-10 | End: 2025-06-08

## 2025-03-10 RX ORDER — ESCITALOPRAM OXALATE 20 MG/1
20 TABLET ORAL DAILY
Qty: 90 TABLET | Refills: 0 | Status: SHIPPED | OUTPATIENT
Start: 2025-03-10

## 2025-03-10 RX ORDER — PRAZOSIN HYDROCHLORIDE 5 MG/1
10 CAPSULE ORAL NIGHTLY
Qty: 180 CAPSULE | Refills: 0 | Status: SHIPPED | OUTPATIENT
Start: 2025-03-10 | End: 2025-06-08

## 2025-03-10 NOTE — PROGRESS NOTES
This provider is located at the [facility], [address] [place] [address], [id number] using a secure [employer] Video Visit through [employer]. Patient is being seen remotely via telehealth at their home address in [place], and stated they are in a secure environment for this session. The patient's condition being diagnosed/treated is appropriate for telemedicine. The provider identified herself as well as her credentials.   The patient, and/or patients guardian, consent to be seen remotely, and when consent is given they understand that the consent allows for patient identifiable information to be sent to a third party as needed.   They may refuse to be seen remotely at any time. The electronic data is encrypted and password protected, and the patient and/or guardian has been advised of the potential risks to privacy not withstanding such measures.  Mode of Visit: Video  Location of patient: -HOME-  Location of provider: +[facility]+  You have chosen to receive care through a telehealth visit.  The patient has signed the video visit consent form.  The visit included audio and video interaction. No technical issues occurred during this visit.    Subjective   Delphine Mcmillan is a 64 y.o. female who presents today for follow up    Chief Complaint:  Depression    History of Present Illness: Patient presents as follow-up via telehealth visit. Reports having multiple family stressors that has played a part with anxiety and depression. States she has been named executive of her brother's estate. This will put her back in contact with her nephew that she stopped communicating with after her father's death. Reports from a medication standpoint she feels meds are working good, she has decided to not resume therapy.  Reports sleep is good.  Reports appetite is good.  She denies SI/HI/AVH.    The following portions of the patient's history were reviewed and updated as appropriate: allergies, current medications,  past family history, past medical history, past social history, past surgical history and problem list.      Past Medical History:  Past Medical History:   Diagnosis Date    Anxiety     Chronic pain disorder     Depression     Disease of thyroid gland     Hyperlipidemia     Type 2 diabetes mellitus        Social History:  Social History     Socioeconomic History    Marital status:    Tobacco Use    Smoking status: Never    Smokeless tobacco: Never   Substance and Sexual Activity    Alcohol use: No    Drug use: No    Sexual activity: Defer       Family History:  Family History   Problem Relation Age of Onset    Anxiety disorder Mother     Depression Mother     Anxiety disorder Sister     Depression Sister        Past Surgical History:  History reviewed. No pertinent surgical history.    Problem List:  Patient Active Problem List   Diagnosis    Morbidly obese        Allergy:   Allergies   Allergen Reactions    Codeine Hives and Itching    Acetaminophen Rash        Current Medications:   Current Outpatient Medications   Medication Sig Dispense Refill    atorvastatin (LIPITOR) 80 MG tablet       buPROPion XL (WELLBUTRIN XL) 300 MG 24 hr tablet Take 1 tablet by mouth Every Morning for 90 days. 90 tablet 0    escitalopram (LEXAPRO) 20 MG tablet Take 1 tablet by mouth Daily. 90 tablet 0    glipiZIDE XL 10 MG 24 hr tablet       levothyroxine (SYNTHROID, LEVOTHROID) 150 MCG tablet       omeprazole (priLOSEC) 20 MG capsule       prazosin (MINIPRESS) 5 MG capsule Take 2 capsules by mouth Every Night for 90 days. 180 capsule 0    QUEtiapine (SEROquel) 200 MG tablet Take 1 tablet by mouth every night at bedtime. 90 tablet 0     No current facility-administered medications for this visit.       Review of Symptoms:    Review of Systems   Constitutional:  Positive for fatigue.   HENT: Negative.     Eyes: Negative.    Respiratory: Negative.     Cardiovascular: Negative.    Gastrointestinal: Negative.    Neurological:  Negative.    Psychiatric/Behavioral:  Positive for sleep disturbance, depressed mood and stress. Negative for suicidal ideas. The patient is nervous/anxious.          Physical Exam:   There were no vitals taken for this visit.  There is no height or weight on file to calculate BMI.    Appearance: Well nourished female, appropriately dressed, appears stated age and in no acute distress  Gait, Station, Strength: FAVIAN    Mental Status Exam:   Hygiene:   good  Cooperation:  Cooperative  Eye Contact:  Good  Psychomotor Behavior:  Appropriate  Affect:  Appropriate  Mood: sad, depressed, and anxious  Hopelessness: 5  Speech:  Normal  Thought Process:  Linear  Thought Content:  Mood congruent  Suicidal:  None  Homicidal:  None  Hallucinations:  None  Delusion:  None  Memory:  Intact  Orientation:  Person, Place, Time, and Situation  Reliability:  good  Insight:  Good  Judgement:  Good  Impulse Control:  Good  Physical/Medical Issues:  Yes See med hx      PHQ-Score Total:  PHQ-9 Total Score: (Patient-Rptd) 15   Patient screened positive for depression based on a PHQ-9 score of 15 on 3/3/2025. Follow-up recommendations include: Prescribed antidepressant medication treatment and Suicide Risk Assessment performed.          Lab Results:   No visits with results within 1 Month(s) from this visit.   Latest known visit with results is:   Telemedicine on 03/28/2022   Component Date Value Ref Range Status    External Amphetamine Screen Urine 03/28/2022 Negative   Final    External Benzodiazepine Screen Uri* 03/28/2022 Negative   Final    External Cocaine Screen Urine 03/28/2022 Negative   Final    External THC Screen Urine 03/28/2022 Negative   Final    External Methadone Screen Urine 03/28/2022 Negative   Final    External Methamphetamine Screen Ur* 03/28/2022 Negative   Final    External Oxycodone Screen Urine 03/28/2022 Negative   Final    External Buprenorphine Screen Urine 03/28/2022 Negative   Final    External MDMA 03/28/2022  Negative   Final    External Opiates Screen Urine 03/28/2022 Negative   Final       Assessment & Plan   Diagnoses and all orders for this visit:    1. Moderate episode of recurrent major depressive disorder (Primary)  -     buPROPion XL (WELLBUTRIN XL) 300 MG 24 hr tablet; Take 1 tablet by mouth Every Morning for 90 days.  Dispense: 90 tablet; Refill: 0  -     escitalopram (LEXAPRO) 20 MG tablet; Take 1 tablet by mouth Daily.  Dispense: 90 tablet; Refill: 0    2. Generalized anxiety disorder  -     escitalopram (LEXAPRO) 20 MG tablet; Take 1 tablet by mouth Daily.  Dispense: 90 tablet; Refill: 0  -     QUEtiapine (SEROquel) 200 MG tablet; Take 1 tablet by mouth every night at bedtime.  Dispense: 90 tablet; Refill: 0    3. Panic disorder with agoraphobia  -     escitalopram (LEXAPRO) 20 MG tablet; Take 1 tablet by mouth Daily.  Dispense: 90 tablet; Refill: 0    4. Post traumatic stress disorder (PTSD)  -     prazosin (MINIPRESS) 5 MG capsule; Take 2 capsules by mouth Every Night for 90 days.  Dispense: 180 capsule; Refill: 0  -     QUEtiapine (SEROquel) 200 MG tablet; Take 1 tablet by mouth every night at bedtime.  Dispense: 90 tablet; Refill: 0    5. Medication management    6. Avoidant behavior    7. Insomnia due to mental disorder  -     prazosin (MINIPRESS) 5 MG capsule; Take 2 capsules by mouth Every Night for 90 days.  Dispense: 180 capsule; Refill: 0  -     QUEtiapine (SEROquel) 200 MG tablet; Take 1 tablet by mouth every night at bedtime.  Dispense: 90 tablet; Refill: 0          -Continue escitalopram 20 mg nightly for anxiety and depression  -Continue bupropion  mg daily for depression  -Continue quetiapine 200 mg nightly for mood and sleep. Lengthy discussion with patient on the possible side effects of antipsychotic medications including increased cholesterol, increased blood sugar, and possibility of weight gain.  Also discussed the need to monitor lab work associated with this.  The risk of muscle  movement disorders with this class of medication was also discussed.  -Continue prazosin 10 mg nightly for nightmares  -Encouraged patient to restart therapy, she is agreeable  -SALLIE reviewed and appropriate. Patient counseled on use of controlled substances  -The benefits of a healthy diet and exercise were discussed with patient, especially the positive effects they have on mental health. Patient encouraged to consider lifestyle modification regarding  diet and exercise patterns to maximize results of mental health treatment.  -Reviewed previous available documentation  -Reviewed most recent available labs                Visit Diagnoses:    ICD-10-CM ICD-9-CM   1. Moderate episode of recurrent major depressive disorder  F33.1 296.32   2. Generalized anxiety disorder  F41.1 300.02   3. Panic disorder with agoraphobia  F40.01 300.21   4. Post traumatic stress disorder (PTSD)  F43.10 309.81   5. Medication management  Z79.899 V58.69   6. Avoidant behavior  R46.89 V40.39   7. Insomnia due to mental disorder  F51.05 300.9     327.02         TREATMENT PLAN/GOALS: Continue supportive psychotherapy efforts and medications as indicated. Treatment and medication options discussed during today's visit. Patient acknowledged and verbally consented to continue with current treatment plan and was educated on the importance of compliance with treatment and follow-up appointments.    MEDICATION ISSUES:    Discussed medication options and treatment plan of prescribed medication as well as the risks, benefits, and side effects including potential falls, possible impaired driving and metabolic adversities among others. Patient is agreeable to call the office with any worsening of symptoms or onset of side effects. Patient is agreeable to call 911 or go to the nearest ER should he/she begin having SI/HI.     MEDS ORDERED DURING VISIT:  New Medications Ordered This Visit   Medications    buPROPion XL (WELLBUTRIN XL) 300 MG 24 hr  tablet     Sig: Take 1 tablet by mouth Every Morning for 90 days.     Dispense:  90 tablet     Refill:  0    escitalopram (LEXAPRO) 20 MG tablet     Sig: Take 1 tablet by mouth Daily.     Dispense:  90 tablet     Refill:  0    prazosin (MINIPRESS) 5 MG capsule     Sig: Take 2 capsules by mouth Every Night for 90 days.     Dispense:  180 capsule     Refill:  0    QUEtiapine (SEROquel) 200 MG tablet     Sig: Take 1 tablet by mouth every night at bedtime.     Dispense:  90 tablet     Refill:  0       Return in about 3 months (around 6/10/2025), or if symptoms worsen or fail to improve.                 This document has been electronically signed by PO Oconnor  March 10, 2025 12:48 EDT    Part of this note may be an electronic transcription/translation of spoken language to printed text using the Dragon Dictation System.

## 2025-06-19 ENCOUNTER — TELEMEDICINE (OUTPATIENT)
Dept: PSYCHIATRY | Facility: CLINIC | Age: 65
End: 2025-06-19
Payer: MEDICARE

## 2025-06-19 DIAGNOSIS — F51.05 INSOMNIA DUE TO MENTAL DISORDER: ICD-10-CM

## 2025-06-19 DIAGNOSIS — F33.1 MODERATE EPISODE OF RECURRENT MAJOR DEPRESSIVE DISORDER: Primary | ICD-10-CM

## 2025-06-19 DIAGNOSIS — R46.89 AVOIDANT BEHAVIOR: ICD-10-CM

## 2025-06-19 DIAGNOSIS — F41.1 GENERALIZED ANXIETY DISORDER: ICD-10-CM

## 2025-06-19 DIAGNOSIS — F40.01 PANIC DISORDER WITH AGORAPHOBIA: ICD-10-CM

## 2025-06-19 DIAGNOSIS — F43.10 POST TRAUMATIC STRESS DISORDER (PTSD): ICD-10-CM

## 2025-06-19 DIAGNOSIS — Z79.899 MEDICATION MANAGEMENT: ICD-10-CM

## 2025-06-19 RX ORDER — ESCITALOPRAM OXALATE 20 MG/1
20 TABLET ORAL DAILY
Qty: 90 TABLET | Refills: 0 | Status: SHIPPED | OUTPATIENT
Start: 2025-06-19

## 2025-06-19 RX ORDER — QUETIAPINE FUMARATE 300 MG/1
300 TABLET, FILM COATED ORAL
Qty: 90 TABLET | Refills: 0 | Status: SHIPPED | OUTPATIENT
Start: 2025-06-19

## 2025-06-19 RX ORDER — BUPROPION HYDROCHLORIDE 300 MG/1
300 TABLET ORAL EVERY MORNING
Qty: 90 TABLET | Refills: 0 | Status: SHIPPED | OUTPATIENT
Start: 2025-06-19 | End: 2025-09-17

## 2025-06-19 RX ORDER — PRAZOSIN HYDROCHLORIDE 5 MG/1
10 CAPSULE ORAL NIGHTLY
Qty: 180 CAPSULE | Refills: 0 | Status: SHIPPED | OUTPATIENT
Start: 2025-06-19 | End: 2025-09-17

## 2025-06-19 NOTE — PROGRESS NOTES
This provider is located at the [facility], [address] [place] [address], [phone] using a secure [id number] Video Visit through [employer]. Patient is being seen remotely via telehealth in [place], and stated they are in a secure environment for this session. The patient's condition being diagnosed/treated is appropriate for telemedicine. The provider identified herself as well as her credentials.   The patient, and/or patients guardian, consent to be seen remotely, and when consent is given they understand that the consent allows for patient identifiable information to be sent to a third party as needed.   They may refuse to be seen remotely at any time. The electronic data is encrypted and password protected, and the patient and/or guardian has been advised of the potential risks to privacy not withstanding such measures.  Mode of Visit: Video  Location of patient: -HOME-  Location of provider: [facility]  You have chosen to receive care through a telehealth visit.  You have chosen to receive care through a telehealth visit.  The patient has signed the video visit consent form.  The visit included audio and video interaction. No technical issues occurred during this visit.    Subjective   Delphine Mcmillan is a 64 y.o. female who presents today for follow up    Chief Complaint:  Depression    History of Present Illness: Patient presents as follow-up via telehealth visit.  She reports medications continue to work well.  States she has been dealing with some stress related to her son's marital issues and potential divorce.  States she also feels he is drinking alcohol that could become a problem for him.  She reports sleep is fair has some nights she has difficulty sleeping.  She reports appetite is good.  She denies SI/HI/AVH.    The following portions of the patient's history were reviewed and updated as appropriate: allergies, current medications, past family history, past medical history, past  social history, past surgical history and problem list.      Past Medical History:  Past Medical History:   Diagnosis Date    Anxiety     Chronic pain disorder     Depression     Disease of thyroid gland     Hyperlipidemia     Type 2 diabetes mellitus        Social History:  Social History     Socioeconomic History    Marital status:    Tobacco Use    Smoking status: Never    Smokeless tobacco: Never   Substance and Sexual Activity    Alcohol use: No    Drug use: No    Sexual activity: Defer       Family History:  Family History   Problem Relation Age of Onset    Anxiety disorder Mother     Depression Mother     Anxiety disorder Sister     Depression Sister        Past Surgical History:  History reviewed. No pertinent surgical history.    Problem List:  Patient Active Problem List   Diagnosis    Morbidly obese        Allergy:   Allergies   Allergen Reactions    Codeine Hives and Itching    Acetaminophen Rash        Current Medications:   Current Outpatient Medications   Medication Sig Dispense Refill    atorvastatin (LIPITOR) 80 MG tablet       buPROPion XL (WELLBUTRIN XL) 300 MG 24 hr tablet Take 1 tablet by mouth Every Morning for 90 days. 90 tablet 0    escitalopram (LEXAPRO) 20 MG tablet Take 1 tablet by mouth Daily. 90 tablet 0    glipiZIDE XL 10 MG 24 hr tablet       levothyroxine (SYNTHROID, LEVOTHROID) 150 MCG tablet       omeprazole (priLOSEC) 20 MG capsule       prazosin (MINIPRESS) 5 MG capsule Take 2 capsules by mouth Every Night for 90 days. 180 capsule 0    QUEtiapine (SEROquel) 300 MG tablet Take 1 tablet by mouth every night at bedtime. 90 tablet 0     No current facility-administered medications for this visit.       Review of Symptoms:    Review of Systems   Constitutional:  Positive for fatigue.   HENT: Negative.     Eyes: Negative.    Respiratory: Negative.     Cardiovascular: Negative.    Gastrointestinal: Negative.    Neurological: Negative.    Psychiatric/Behavioral:  Positive for sleep  disturbance, depressed mood and stress. Negative for suicidal ideas. The patient is nervous/anxious.          Physical Exam:   There were no vitals taken for this visit.  There is no height or weight on file to calculate BMI.    Appearance: Well nourished female, appropriately dressed, appears stated age and in no acute distress  Gait, Station, Strength: FAVIAN    Mental Status Exam:   Hygiene:   good  Cooperation:  Cooperative  Eye Contact:  Good  Psychomotor Behavior:  Appropriate  Affect:  Appropriate  Mood: normal  Hopelessness: 6  Speech:  Normal  Thought Process:  Linear  Thought Content:  Mood congruent  Suicidal:  None  Homicidal:  None  Hallucinations:  None  Delusion:  None  Memory:  Intact  Orientation:  Person, Place, Time, and Situation  Reliability:  good  Insight:  Good  Judgement:  Good  Impulse Control:  Good  Physical/Medical Issues:  see med hx     PHQ-Score Total:  PHQ-9 Total Score: (Patient-Rptd) 12   Patient screened positive for depression based on a PHQ-9 score of 12 on 6/17/2025. Follow-up recommendations include: Prescribed antidepressant medication treatment and Suicide Risk Assessment performed.          Lab Results:   No visits with results within 1 Month(s) from this visit.   Latest known visit with results is:   Telemedicine on 03/28/2022   Component Date Value Ref Range Status    External Amphetamine Screen Urine 03/28/2022 Negative   Final    External Benzodiazepine Screen Uri* 03/28/2022 Negative   Final    External Cocaine Screen Urine 03/28/2022 Negative   Final    External THC Screen Urine 03/28/2022 Negative   Final    External Methadone Screen Urine 03/28/2022 Negative   Final    External Methamphetamine Screen Ur* 03/28/2022 Negative   Final    External Oxycodone Screen Urine 03/28/2022 Negative   Final    External Buprenorphine Screen Urine 03/28/2022 Negative   Final    External MDMA 03/28/2022 Negative   Final    External Opiates Screen Urine 03/28/2022 Negative   Final        Assessment & Plan   Diagnoses and all orders for this visit:    1. Moderate episode of recurrent major depressive disorder (Primary)  -     buPROPion XL (WELLBUTRIN XL) 300 MG 24 hr tablet; Take 1 tablet by mouth Every Morning for 90 days.  Dispense: 90 tablet; Refill: 0  -     escitalopram (LEXAPRO) 20 MG tablet; Take 1 tablet by mouth Daily.  Dispense: 90 tablet; Refill: 0    2. Medication management    3. Avoidant behavior    4. Post traumatic stress disorder (PTSD)  -     prazosin (MINIPRESS) 5 MG capsule; Take 2 capsules by mouth Every Night for 90 days.  Dispense: 180 capsule; Refill: 0  -     QUEtiapine (SEROquel) 300 MG tablet; Take 1 tablet by mouth every night at bedtime.  Dispense: 90 tablet; Refill: 0    5. Insomnia due to mental disorder  -     prazosin (MINIPRESS) 5 MG capsule; Take 2 capsules by mouth Every Night for 90 days.  Dispense: 180 capsule; Refill: 0  -     QUEtiapine (SEROquel) 300 MG tablet; Take 1 tablet by mouth every night at bedtime.  Dispense: 90 tablet; Refill: 0    6. Generalized anxiety disorder  -     escitalopram (LEXAPRO) 20 MG tablet; Take 1 tablet by mouth Daily.  Dispense: 90 tablet; Refill: 0  -     QUEtiapine (SEROquel) 300 MG tablet; Take 1 tablet by mouth every night at bedtime.  Dispense: 90 tablet; Refill: 0    7. Panic disorder with agoraphobia  -     escitalopram (LEXAPRO) 20 MG tablet; Take 1 tablet by mouth Daily.  Dispense: 90 tablet; Refill: 0          -Continue escitalopram 20 mg nightly for anxiety and depression  -Continue bupropion  mg daily for depression  -Increase quetiapine 300 mg nightly for mood and sleep. Lengthy discussion with patient on the possible side effects of antipsychotic medications including increased cholesterol, increased blood sugar, and possibility of weight gain.  Also discussed the need to monitor lab work associated with this.  The risk of muscle movement disorders with this class of medication was also  discussed.  -Continue prazosin 10 mg nightly for nightmares  -Encouraged patient to restart therapy, she is agreeable  -SALLIE reviewed and appropriate. Patient counseled on use of controlled substances  -The benefits of a healthy diet and exercise were discussed with patient, especially the positive effects they have on mental health. Patient encouraged to consider lifestyle modification regarding  diet and exercise patterns to maximize results of mental health treatment.  -Reviewed previous available documentation  -Reviewed most recent available labs                Visit Diagnoses:    ICD-10-CM ICD-9-CM   1. Moderate episode of recurrent major depressive disorder  F33.1 296.32   2. Medication management  Z79.899 V58.69   3. Avoidant behavior  R46.89 V40.39   4. Post traumatic stress disorder (PTSD)  F43.10 309.81   5. Insomnia due to mental disorder  F51.05 300.9     327.02   6. Generalized anxiety disorder  F41.1 300.02   7. Panic disorder with agoraphobia  F40.01 300.21       TREATMENT PLAN/GOALS: Continue supportive psychotherapy efforts and medications as indicated. Treatment and medication options discussed during today's visit. Patient acknowledged and verbally consented to continue with current treatment plan and was educated on the importance of compliance with treatment and follow-up appointments.    MEDICATION ISSUES:    Discussed medication options and treatment plan of prescribed medication as well as the risks, benefits, and side effects including potential falls, possible impaired driving and metabolic adversities among others. Patient is agreeable to call the office with any worsening of symptoms or onset of side effects. Patient is agreeable to call 911 or go to the nearest ER should he/she begin having SI/HI.     MEDS ORDERED DURING VISIT:  New Medications Ordered This Visit   Medications    buPROPion XL (WELLBUTRIN XL) 300 MG 24 hr tablet     Sig: Take 1 tablet by mouth Every Morning for 90  days.     Dispense:  90 tablet     Refill:  0    escitalopram (LEXAPRO) 20 MG tablet     Sig: Take 1 tablet by mouth Daily.     Dispense:  90 tablet     Refill:  0    prazosin (MINIPRESS) 5 MG capsule     Sig: Take 2 capsules by mouth Every Night for 90 days.     Dispense:  180 capsule     Refill:  0    QUEtiapine (SEROquel) 300 MG tablet     Sig: Take 1 tablet by mouth every night at bedtime.     Dispense:  90 tablet     Refill:  0       Return in about 8 weeks (around 8/14/2025), or if symptoms worsen or fail to improve.               This document has been electronically signed by PO Oconnor  June 19, 2025 15:33 EDT    Portions of this note may have been copied from previous notes but have been reviewed as of this date for appropriateness    Part of this note may be an electronic transcription/translation of spoken language to printed text using the Dragon Dictation System.

## 2025-08-19 ENCOUNTER — TELEMEDICINE (OUTPATIENT)
Dept: PSYCHIATRY | Facility: CLINIC | Age: 65
End: 2025-08-19
Payer: MEDICARE

## 2025-08-19 DIAGNOSIS — R46.89 AVOIDANT BEHAVIOR: ICD-10-CM

## 2025-08-19 DIAGNOSIS — F40.01 PANIC DISORDER WITH AGORAPHOBIA: ICD-10-CM

## 2025-08-19 DIAGNOSIS — Z79.899 MEDICATION MANAGEMENT: ICD-10-CM

## 2025-08-19 DIAGNOSIS — F51.05 INSOMNIA DUE TO MENTAL DISORDER: ICD-10-CM

## 2025-08-19 DIAGNOSIS — F43.10 POST TRAUMATIC STRESS DISORDER (PTSD): ICD-10-CM

## 2025-08-19 DIAGNOSIS — F41.1 GENERALIZED ANXIETY DISORDER: Primary | ICD-10-CM

## 2025-08-19 DIAGNOSIS — F33.1 MODERATE EPISODE OF RECURRENT MAJOR DEPRESSIVE DISORDER: ICD-10-CM

## 2025-08-19 RX ORDER — BUPROPION HYDROCHLORIDE 300 MG/1
300 TABLET ORAL EVERY MORNING
Qty: 90 TABLET | Refills: 0 | Status: SHIPPED | OUTPATIENT
Start: 2025-08-19 | End: 2025-11-17

## 2025-08-19 RX ORDER — PRAZOSIN HYDROCHLORIDE 5 MG/1
10 CAPSULE ORAL NIGHTLY
Qty: 180 CAPSULE | Refills: 0 | Status: SHIPPED | OUTPATIENT
Start: 2025-08-19 | End: 2025-11-17

## 2025-08-19 RX ORDER — ESCITALOPRAM OXALATE 20 MG/1
20 TABLET ORAL DAILY
Qty: 90 TABLET | Refills: 0 | Status: SHIPPED | OUTPATIENT
Start: 2025-08-19

## 2025-08-19 RX ORDER — QUETIAPINE FUMARATE 200 MG/1
200 TABLET, FILM COATED ORAL
Qty: 90 TABLET | Refills: 0 | Status: SHIPPED | OUTPATIENT
Start: 2025-08-19